# Patient Record
Sex: FEMALE | Race: WHITE | NOT HISPANIC OR LATINO | Employment: FULL TIME | URBAN - METROPOLITAN AREA
[De-identification: names, ages, dates, MRNs, and addresses within clinical notes are randomized per-mention and may not be internally consistent; named-entity substitution may affect disease eponyms.]

---

## 2017-01-09 ENCOUNTER — ALLSCRIPTS OFFICE VISIT (OUTPATIENT)
Dept: OTHER | Facility: OTHER | Age: 42
End: 2017-01-09

## 2017-01-18 ENCOUNTER — TRANSCRIBE ORDERS (OUTPATIENT)
Dept: ADMINISTRATIVE | Facility: HOSPITAL | Age: 42
End: 2017-01-18

## 2017-01-18 ENCOUNTER — ALLSCRIPTS OFFICE VISIT (OUTPATIENT)
Dept: OTHER | Facility: OTHER | Age: 42
End: 2017-01-18

## 2017-01-18 ENCOUNTER — HOSPITAL ENCOUNTER (OUTPATIENT)
Dept: RADIOLOGY | Facility: HOSPITAL | Age: 42
Discharge: HOME | End: 2017-01-18
Payer: COMMERCIAL

## 2017-01-18 DIAGNOSIS — M54.2 CERVICALGIA: ICD-10-CM

## 2017-01-18 PROCEDURE — 72050 X-RAY EXAM NECK SPINE 4/5VWS: CPT

## 2017-01-19 ENCOUNTER — GENERIC CONVERSION - ENCOUNTER (OUTPATIENT)
Dept: OTHER | Facility: OTHER | Age: 42
End: 2017-01-19

## 2017-01-27 ENCOUNTER — LAB REQUISITION (OUTPATIENT)
Dept: LAB | Facility: HOSPITAL | Age: 42
End: 2017-01-27
Payer: COMMERCIAL

## 2017-01-27 ENCOUNTER — ALLSCRIPTS OFFICE VISIT (OUTPATIENT)
Dept: OTHER | Facility: OTHER | Age: 42
End: 2017-01-27

## 2017-01-27 DIAGNOSIS — Z01.419 ENCOUNTER FOR GYNECOLOGICAL EXAMINATION WITHOUT ABNORMAL FINDING: ICD-10-CM

## 2017-01-27 PROCEDURE — 87624 HPV HI-RISK TYP POOLED RSLT: CPT | Performed by: PHYSICIAN ASSISTANT

## 2017-01-27 PROCEDURE — G0145 SCR C/V CYTO,THINLAYER,RESCR: HCPCS | Performed by: PHYSICIAN ASSISTANT

## 2017-01-31 LAB — HPV RRNA GENITAL QL NAA+PROBE: NORMAL

## 2017-02-02 ENCOUNTER — HOSPITAL ENCOUNTER (OUTPATIENT)
Dept: RADIOLOGY | Age: 42
Discharge: HOME | End: 2017-02-02
Payer: COMMERCIAL

## 2017-02-02 DIAGNOSIS — Z12.31 ENCOUNTER FOR SCREENING MAMMOGRAM FOR MALIGNANT NEOPLASM OF BREAST: ICD-10-CM

## 2017-02-02 LAB
LAB AP GYN PRIMARY INTERPRETATION: NORMAL
Lab: NORMAL
PATH INTERP SPEC-IMP: NORMAL

## 2017-02-02 PROCEDURE — G0202 SCR MAMMO BI INCL CAD: HCPCS

## 2017-02-03 DIAGNOSIS — R92.8 OTHER ABNORMAL AND INCONCLUSIVE FINDINGS ON DIAGNOSTIC IMAGING OF BREAST: ICD-10-CM

## 2017-02-06 ENCOUNTER — HOSPITAL ENCOUNTER (OUTPATIENT)
Dept: ULTRASOUND IMAGING | Facility: CLINIC | Age: 42
Discharge: HOME | End: 2017-02-06
Payer: COMMERCIAL

## 2017-02-06 DIAGNOSIS — R92.8 OTHER ABNORMAL AND INCONCLUSIVE FINDINGS ON DIAGNOSTIC IMAGING OF BREAST: ICD-10-CM

## 2017-02-06 PROCEDURE — 76642 ULTRASOUND BREAST LIMITED: CPT

## 2017-03-02 ENCOUNTER — ALLSCRIPTS OFFICE VISIT (OUTPATIENT)
Dept: OTHER | Facility: OTHER | Age: 42
End: 2017-03-02

## 2017-03-08 ENCOUNTER — APPOINTMENT (OUTPATIENT)
Dept: URGENT CARE | Age: 42
End: 2017-03-08
Payer: OTHER MISCELLANEOUS

## 2017-03-08 ENCOUNTER — TRANSCRIBE ORDERS (OUTPATIENT)
Dept: URGENT CARE | Age: 42
End: 2017-03-08

## 2017-03-08 ENCOUNTER — HOSPITAL ENCOUNTER (OUTPATIENT)
Dept: RADIOLOGY | Age: 42
Discharge: HOME | End: 2017-03-08
Admitting: PREVENTIVE MEDICINE
Payer: OTHER MISCELLANEOUS

## 2017-03-08 DIAGNOSIS — T14.90XA INJURY: Primary | ICD-10-CM

## 2017-03-08 DIAGNOSIS — T14.90XA INJURY: ICD-10-CM

## 2017-03-08 PROCEDURE — 72040 X-RAY EXAM NECK SPINE 2-3 VW: CPT

## 2017-03-08 PROCEDURE — G0382 LEV 3 HOSP TYPE B ED VISIT: HCPCS | Performed by: FAMILY MEDICINE

## 2017-03-08 PROCEDURE — 99283 EMERGENCY DEPT VISIT LOW MDM: CPT | Performed by: FAMILY MEDICINE

## 2017-03-20 ENCOUNTER — APPOINTMENT (OUTPATIENT)
Dept: URGENT CARE | Age: 42
End: 2017-03-20
Payer: OTHER MISCELLANEOUS

## 2017-03-20 ENCOUNTER — ALLSCRIPTS OFFICE VISIT (OUTPATIENT)
Dept: OTHER | Facility: OTHER | Age: 42
End: 2017-03-20

## 2017-03-20 PROCEDURE — 99213 OFFICE O/P EST LOW 20 MIN: CPT | Performed by: PREVENTIVE MEDICINE

## 2017-03-21 ENCOUNTER — LAB REQUISITION (OUTPATIENT)
Dept: LAB | Facility: HOSPITAL | Age: 42
End: 2017-03-21
Payer: COMMERCIAL

## 2017-03-21 DIAGNOSIS — R87.619 ABNORMAL CYTOLOGICAL FINDING IN SPECIMEN FROM CERVIX UTERI: ICD-10-CM

## 2017-03-21 PROCEDURE — 88305 TISSUE EXAM BY PATHOLOGIST: CPT | Performed by: OBSTETRICS & GYNECOLOGY

## 2017-04-28 ENCOUNTER — ALLSCRIPTS OFFICE VISIT (OUTPATIENT)
Dept: OTHER | Facility: OTHER | Age: 42
End: 2017-04-28

## 2017-04-28 DIAGNOSIS — E53.8 DEFICIENCY OF OTHER SPECIFIED B GROUP VITAMINS: ICD-10-CM

## 2017-04-28 DIAGNOSIS — E55.9 VITAMIN D DEFICIENCY: ICD-10-CM

## 2017-04-28 DIAGNOSIS — E03.9 HYPOTHYROIDISM: ICD-10-CM

## 2017-04-28 DIAGNOSIS — E61.1 IRON DEFICIENCY: ICD-10-CM

## 2017-04-28 DIAGNOSIS — R73.09 OTHER ABNORMAL GLUCOSE: ICD-10-CM

## 2017-04-29 ENCOUNTER — APPOINTMENT (OUTPATIENT)
Dept: LAB | Age: 42
End: 2017-04-29
Payer: COMMERCIAL

## 2017-04-29 ENCOUNTER — TRANSCRIBE ORDERS (OUTPATIENT)
Dept: ADMINISTRATIVE | Age: 42
End: 2017-04-29

## 2017-04-29 DIAGNOSIS — E61.1 IRON DEFICIENCY: ICD-10-CM

## 2017-04-29 DIAGNOSIS — E53.8 DEFICIENCY OF OTHER SPECIFIED B GROUP VITAMINS: ICD-10-CM

## 2017-04-29 DIAGNOSIS — E55.9 VITAMIN D DEFICIENCY: ICD-10-CM

## 2017-04-29 DIAGNOSIS — R73.09 OTHER ABNORMAL GLUCOSE: ICD-10-CM

## 2017-04-29 DIAGNOSIS — E03.9 HYPOTHYROIDISM: ICD-10-CM

## 2017-04-29 LAB
25(OH)D3 SERPL-MCNC: 30.2 NG/ML (ref 30–100)
ALBUMIN SERPL BCP-MCNC: 3 G/DL (ref 3.5–5)
ALP SERPL-CCNC: 84 U/L (ref 46–116)
ALT SERPL W P-5'-P-CCNC: 20 U/L (ref 12–78)
ANION GAP SERPL CALCULATED.3IONS-SCNC: 8 MMOL/L (ref 4–13)
AST SERPL W P-5'-P-CCNC: 16 U/L (ref 5–45)
BASOPHILS # BLD AUTO: 0.07 THOUSANDS/ΜL (ref 0–0.1)
BASOPHILS NFR BLD AUTO: 1 % (ref 0–1)
BILIRUB SERPL-MCNC: 0.46 MG/DL (ref 0.2–1)
BUN SERPL-MCNC: 7 MG/DL (ref 5–25)
CALCIUM SERPL-MCNC: 9 MG/DL (ref 8.3–10.1)
CHLORIDE SERPL-SCNC: 107 MMOL/L (ref 100–108)
CHOLEST SERPL-MCNC: 197 MG/DL (ref 50–200)
CO2 SERPL-SCNC: 27 MMOL/L (ref 21–32)
CREAT SERPL-MCNC: 0.82 MG/DL (ref 0.6–1.3)
EOSINOPHIL # BLD AUTO: 0.31 THOUSAND/ΜL (ref 0–0.61)
EOSINOPHIL NFR BLD AUTO: 4 % (ref 0–6)
ERYTHROCYTE [DISTWIDTH] IN BLOOD BY AUTOMATED COUNT: 13.9 % (ref 11.6–15.1)
EST. AVERAGE GLUCOSE BLD GHB EST-MCNC: 114 MG/DL
FERRITIN SERPL-MCNC: 9 NG/ML (ref 8–388)
GFR SERPL CREATININE-BSD FRML MDRD: >60 ML/MIN/1.73SQ M
GLUCOSE P FAST SERPL-MCNC: 84 MG/DL (ref 65–99)
HBA1C MFR BLD: 5.6 % (ref 4.2–6.3)
HCT VFR BLD AUTO: 38.7 % (ref 34.8–46.1)
HDLC SERPL-MCNC: 69 MG/DL (ref 40–60)
HGB BLD-MCNC: 12.3 G/DL (ref 11.5–15.4)
IRON SERPL-MCNC: 57 UG/DL (ref 50–170)
LDLC SERPL CALC-MCNC: 101 MG/DL (ref 0–100)
LYMPHOCYTES # BLD AUTO: 2.96 THOUSANDS/ΜL (ref 0.6–4.47)
LYMPHOCYTES NFR BLD AUTO: 40 % (ref 14–44)
MCH RBC QN AUTO: 28.6 PG (ref 26.8–34.3)
MCHC RBC AUTO-ENTMCNC: 31.8 G/DL (ref 31.4–37.4)
MCV RBC AUTO: 90 FL (ref 82–98)
MONOCYTES # BLD AUTO: 0.51 THOUSAND/ΜL (ref 0.17–1.22)
MONOCYTES NFR BLD AUTO: 7 % (ref 4–12)
NEUTROPHILS # BLD AUTO: 3.57 THOUSANDS/ΜL (ref 1.85–7.62)
NEUTS SEG NFR BLD AUTO: 48 % (ref 43–75)
NRBC BLD AUTO-RTO: 0 /100 WBCS
PLATELET # BLD AUTO: 369 THOUSANDS/UL (ref 149–390)
PMV BLD AUTO: 10.2 FL (ref 8.9–12.7)
POTASSIUM SERPL-SCNC: 4.5 MMOL/L (ref 3.5–5.3)
PROT SERPL-MCNC: 7.2 G/DL (ref 6.4–8.2)
RBC # BLD AUTO: 4.3 MILLION/UL (ref 3.81–5.12)
SODIUM SERPL-SCNC: 142 MMOL/L (ref 136–145)
TRIGL SERPL-MCNC: 134 MG/DL
TSH SERPL DL<=0.05 MIU/L-ACNC: 0.99 UIU/ML (ref 0.36–3.74)
VIT B12 SERPL-MCNC: 354 PG/ML (ref 100–900)
WBC # BLD AUTO: 7.43 THOUSAND/UL (ref 4.31–10.16)

## 2017-04-29 PROCEDURE — 80061 LIPID PANEL: CPT

## 2017-04-29 PROCEDURE — 82728 ASSAY OF FERRITIN: CPT

## 2017-04-29 PROCEDURE — 84443 ASSAY THYROID STIM HORMONE: CPT

## 2017-04-29 PROCEDURE — 82306 VITAMIN D 25 HYDROXY: CPT

## 2017-04-29 PROCEDURE — 82607 VITAMIN B-12: CPT

## 2017-04-29 PROCEDURE — 85025 COMPLETE CBC W/AUTO DIFF WBC: CPT

## 2017-04-29 PROCEDURE — 36415 COLL VENOUS BLD VENIPUNCTURE: CPT

## 2017-04-29 PROCEDURE — 83540 ASSAY OF IRON: CPT

## 2017-04-29 PROCEDURE — 83036 HEMOGLOBIN GLYCOSYLATED A1C: CPT

## 2017-04-29 PROCEDURE — 80053 COMPREHEN METABOLIC PANEL: CPT

## 2017-04-30 ENCOUNTER — GENERIC CONVERSION - ENCOUNTER (OUTPATIENT)
Dept: OTHER | Facility: OTHER | Age: 42
End: 2017-04-30

## 2017-06-21 ENCOUNTER — ALLSCRIPTS OFFICE VISIT (OUTPATIENT)
Dept: OTHER | Facility: OTHER | Age: 42
End: 2017-06-21

## 2018-01-10 NOTE — RESULT NOTES
Verified Results  * XR SPINE CERVICAL COMPLETE 4 OR 5 VW NON INJURY [id number] 11:16AM [facility] [employer] Order Number: [id number]     Test Name Result Flag Reference   XR SPINE CERVICAL COMPLETE 4 OR 5 VW (Report)     CERVICAL SPINE     INDICATION: Pinching sensation in left side of head, neck and shoulder  No trauma  COMPARISON: January 7, 2009     VIEWS: 5; 5 images     FINDINGS:     Vertebral alignment is normal      The vertebral bodies are normal in height  There is no evidence of fracture, subluxation or dislocation  There is no bone destruction or osteoblastic lesion  The intervertebral disc spaces are preserved  The facet joints are unremarkable  The prevertebral soft tissues are normal in thickness  The lung apices are clear  IMPRESSION:     Unremarkable cervical spine            Workstation performed: [id number]     Signed by:   Garfield Dobson MD   1/19/17       Discussion/Summary   Normal Neck X ray   - Dr Patricia Meyer   Electronically signed by : Cesar Humphries MD; Jan 19 2017  1:14PM EST                       (Author)

## 2018-01-12 VITALS
DIASTOLIC BLOOD PRESSURE: 84 MMHG | HEART RATE: 88 BPM | WEIGHT: 211.25 LBS | RESPIRATION RATE: 18 BRPM | SYSTOLIC BLOOD PRESSURE: 110 MMHG | BODY MASS INDEX: 32.12 KG/M2

## 2018-01-12 VITALS
WEIGHT: 209.6 LBS | HEART RATE: 96 BPM | RESPIRATION RATE: 18 BRPM | DIASTOLIC BLOOD PRESSURE: 88 MMHG | BODY MASS INDEX: 31.77 KG/M2 | SYSTOLIC BLOOD PRESSURE: 120 MMHG | HEIGHT: 68 IN

## 2018-01-13 VITALS
WEIGHT: 214 LBS | DIASTOLIC BLOOD PRESSURE: 84 MMHG | BODY MASS INDEX: 32.43 KG/M2 | SYSTOLIC BLOOD PRESSURE: 130 MMHG | HEIGHT: 68 IN

## 2018-01-13 NOTE — RESULT NOTES
Discussion/Summary   EVERYTHING IS OK, WITHIN RANGE, BUT IRON AND IRON STORES ARE SLIGHTLY ON THE LOWER END  YOU COULD BENEFIT FROM ORAL IRON REPLACMENT   DR Elizabeth Tolliver     Verified Results  (1) COMPREHENSIVE METABOLIC PANEL [id number] 36:36JF [facility] [employer] Order Number: [id number]     Test Name Result Flag Reference   SODIUM 142 mmol/L  136-145   POTASSIUM 4 5 mmol/L  3 5-5 3   CHLORIDE 107 mmol/L  100-108   CARBON DIOXIDE 27 mmol/L  21-32   ANION GAP (CALC) 8 mmol/L  4-13   BLOOD UREA NITROGEN 7 mg/dL  5-25   CREATININE 0 82 mg/dL  0 60-1 30   Standardized to IDMS reference method   CALCIUM 9 0 mg/dL  8 3-10 1   BILI, TOTAL 0 46 mg/dL  0 20-1 00   ALK PHOSPHATAS 84 U/L     ALT (SGPT) 20 U/L  12-78   AST(SGOT) 16 U/L  5-45   ALBUMIN 3 0 g/dL L 3 5-5 0   TOTAL PROTEIN 7 2 g/dL  6 4-8 2   eGFR Non-African American      >60 0 ml/min/1 73sq [facility] Disease Education Program recommendations are as follows:  GFR calculation is accurate only with a steady state creatinine  Chronic Kidney disease less than 60 ml/min/1 73 sq  meters  Kidney failure less than 15 ml/min/1 73 sq  meters  GLUCOSE FASTING 84 mg/dL  65-99     (1) HEMOGLOBIN A1C 29Apr2017 10:19AM [employer] Order Number: [id number]     Test Name Result Flag Reference   HEMOGLOBIN A1C 5 6 %  4 2-6 3   EST  AVG   GLUCOSE 114 mg/dl       (1) LIPID PANEL FASTING W DIRECT LDL REFLEX 29Apr2017 10:19AM [employer] Order Number: [id number]     Test Name Result Flag Reference   CHOLESTEROL 197 mg/dL     LDL CHOLESTEROL CALCULATED 101 mg/dL H 0-100   Triglyceride:         Normal              <150 mg/dl       Borderline High    150-199 mg/dl       High               200-499 mg/dl       Very High          >499 mg/dl  Cholesterol:         Desirable        <200 mg/dl      Borderline High  200-239 mg/dl      High             >239 mg/dl  HDL Cholesterol:        High    >59 mg/dL      Low     <41 mg/dL  LDL Cholesterol:        Optimal          <100 mg/dl        Near Optimal     100-129 mg/dl        Above Optimal          Borderline High   130-159 mg/dl          High              160-189 mg/dl          Very High        >189 mg/dl  LDL CALCULATED:    This screening LDL is a calculated result  It does not have the accuracy of the Direct Measured LDL in the monitoring of patients with hyperlipidemia and/or statin therapy  Direct Measure LDL (XMD504) must be ordered separately in these patients  TRIGLYCERIDES 134 mg/dL  <=150   Specimen collection should occur prior to N-Acetylcysteine or Metamizole administration due to the potential for falsely depressed results  HDL,DIRECT 69 mg/dL H 40-60   Specimen collection should occur prior to Metamizole administration due to the potential for falsely depressed results  (1) TSH WITH FT4 REFLEX 29Apr2017 10:19AM Kati Reis Order Number: [id number]     Test Name Result Flag Reference   TSH 0 989 uIU/mL  0 358-3 740   Patients undergoing fluorescein dye angiography may retain small amounts of fluorescein in the body for 48-72 hours post procedure  Samples containing fluorescein can produce falsely depressed TSH values  If the patient had this procedure,a specimen should be resubmitted post fluorescein clearance  The recommended reference ranges for TSH during pregnancy are as follows:  First trimester 0 1 to 2 5 uIU/mL  Second trimester  0 2 to 3 0 uIU/mL  Third trimester 0 3 to 3 0 uIU/m     (1) CBC/PLT/DIFF 29Apr2017 10:19AM Betty Hernandez    Order Number: [id number]     Test Name Result Flag Reference   WBC COUNT 7 43 Thousand/uL  4 31-10 16   RBC COUNT 4 30 Million/uL  3 81-5 12   HEMOGLOBIN 12 3 g/dL  11 5-15 4   HEMATOCRIT 38 7 %  34 8-46  1   MCV 90 fL  82-98   MCH 28 6 pg  26 8-34 3   MCHC 31 8 g/dL  31 4-37 4   RDW 13 9 %  11 6-15 1   MPV 10 2 fL  8 9-12 7   PLATELET COUNT 991 Thousands/uL  149-390   nRBC AUTOMATED 0 /100 WBCs     NEUTROPHILS RELATIVE PERCENT 48 %  43-75   LYMPHOCYTES RELATIVE PERCENT 40 %  14-44   MONOCYTES RELATIVE PERCENT 7 %  4-12   EOSINOPHILS RELATIVE PERCENT 4 %  0-6   BASOPHILS RELATIVE PERCENT 1 %  0-1   NEUTROPHILS ABSOLUTE COUNT 3 57 Thousands/? ??L  1 85-7 62   LYMPHOCYTES ABSOLUTE COUNT 2 96 Thousands/? ??L  0 60-4 47   MONOCYTES ABSOLUTE COUNT 0 51 Thousand/? ??L  0 17-1 22   EOSINOPHILS ABSOLUTE COUNT 0 31 Thousand/? ??L  0 00-0 61   BASOPHILS ABSOLUTE COUNT 0 07 Thousands/? ??L  0 00-0 10     (1) VITAMIN B12 29Apr2017 10:19AM [employer] Order Number: [id number]     Test Name Result Flag Reference   VITAMIN B12 354 pg/mL  100-900     (1) VITAMIN D 25-HYDROXY 29Apr2017 10:19AM [employer] Order Number: [id number]     Test Name Result Flag Reference   VIT D 25-HYDROX 30 2 ng/mL  30 0-100 0   This assay is a certified procedure of the CDC Vitamin D Standardization Certification Program (VDSCP)     Deficiency <20ng/ml   Insufficiency 20-30ng/ml   Sufficient  ng/ml     *Patients undergoing fluorescein dye angiography may retain small amounts of fluorescein in the body for 48-72 hours post procedure  Samples containing fluorescein can produce falsely elevated Vitamin D values  If the patient had this procedure, a specimen should be resubmitted post fluorescein clearance  (1) IRON [id number] 10:19AM [employer] Order Number: [id number]     Test Name Result Flag Reference   IRON 57 ug/dL     Patients treated with metal-binding drugs (ie  Deferoxamine) may have depressed iron values       (1) FERRITIN [id number] 10:19AM [employer] Order Number: [id number]     Test Name Result Flag Reference   FERRITIN 9 ng/mL  7-933

## 2018-01-14 VITALS
BODY MASS INDEX: 32.28 KG/M2 | WEIGHT: 213 LBS | SYSTOLIC BLOOD PRESSURE: 130 MMHG | DIASTOLIC BLOOD PRESSURE: 82 MMHG | HEIGHT: 68 IN

## 2018-01-14 VITALS
SYSTOLIC BLOOD PRESSURE: 124 MMHG | HEART RATE: 78 BPM | DIASTOLIC BLOOD PRESSURE: 82 MMHG | RESPIRATION RATE: 18 BRPM | BODY MASS INDEX: 31.99 KG/M2 | WEIGHT: 210.38 LBS

## 2018-01-14 NOTE — RESULT NOTES
Verified Results  (1) TSH [id number] 07:42AM [facility] Order Number: [id number]    Patients undergoing fluorescein dye angiography may retain small amounts of fluorescein in the body for 48-72 hours post procedure  Samples containing fluorescein can produce falsely depressed TSH values  If the patient had this procedure,a specimen should be resubmitted post fluorescein clearance          The recommended reference ranges for TSH during pregnancy are as follows:  First trimester 0 1 to 2 5 uIU/mL  Second trimester  0 2 to 3 0 uIU/mL  Third trimester 0 3 to 3 0 uIU/m     Test Name Result Flag Reference   TSH 1 087 uIU/mL  0 358-3 740     (1) VITAMIN B12 [id number] 07:42AM Tex Duran     Test Name Result Flag Reference   VITAMIN B12 192 pg/mL  100-900     (1) VITAMIN D 25-HYDROXY [id number] 07:42AM Tex Duran     Test Name Result Flag Reference   VIT D 25-HYDROX 5 6 ng/mL L 30 0-100 0       Plan  Vitamin D deficiency    · Vitamin D (Ergocalciferol) 39551 UNIT Oral Capsule; TAKE 1 CAPSULE WEEKLY

## 2018-01-15 VITALS
WEIGHT: 214.25 LBS | DIASTOLIC BLOOD PRESSURE: 70 MMHG | SYSTOLIC BLOOD PRESSURE: 120 MMHG | HEIGHT: 68 IN | HEART RATE: 78 BPM | BODY MASS INDEX: 32.47 KG/M2 | RESPIRATION RATE: 18 BRPM

## 2018-01-18 NOTE — MISCELLANEOUS
Message  Return to work or school:   Tarsha Phelps is under my professional care  She was seen in my office on 12/29/2016   She is able to return to work on  12/30/2016            Future Appointments    Signatures   Electronically signed by : MIKE Garcia; Dec 29 2016 12:50PM EST                       (Author)    Electronically signed by :  MIKE Garcia; Dec 29 2016  3:36PM EST

## 2018-01-29 ENCOUNTER — TRANSCRIBE ORDERS (OUTPATIENT)
Dept: OBGYN CLINIC | Facility: CLINIC | Age: 43
End: 2018-01-29

## 2018-01-29 ENCOUNTER — TELEPHONE (OUTPATIENT)
Dept: OBGYN CLINIC | Facility: CLINIC | Age: 43
End: 2018-01-29

## 2018-01-29 DIAGNOSIS — Z30.41 ENCOUNTER FOR SURVEILLANCE OF CONTRACEPTIVE PILLS: Primary | ICD-10-CM

## 2018-01-29 DIAGNOSIS — Z12.31 ENCOUNTER FOR SCREENING MAMMOGRAM FOR MALIGNANT NEOPLASM OF BREAST: Primary | ICD-10-CM

## 2018-02-01 DIAGNOSIS — Z30.41 ENCOUNTER FOR SURVEILLANCE OF CONTRACEPTIVE PILLS: Primary | ICD-10-CM

## 2018-02-01 RX ORDER — DROSPIRENONE AND ETHINYL ESTRADIOL 0.02-3(28)
1 KIT ORAL DAILY
Qty: 28 TABLET | Refills: 0 | Status: SHIPPED | OUTPATIENT
Start: 2018-02-01 | End: 2018-03-01 | Stop reason: SDUPTHER

## 2018-02-01 RX ORDER — DROSPIRENONE AND ETHINYL ESTRADIOL 0.02-3(28)
1 KIT ORAL DAILY
Qty: 28 TABLET | Refills: 1 | Status: SHIPPED | OUTPATIENT
Start: 2018-02-01 | End: 2018-03-01 | Stop reason: SDUPTHER

## 2018-02-02 RX ORDER — DROSPIRENONE AND ETHINYL ESTRADIOL 0.02-3(28)
1 KIT ORAL DAILY
Qty: 90 TABLET | Refills: 0 | Status: SHIPPED | OUTPATIENT
Start: 2018-02-02 | End: 2018-03-01 | Stop reason: SDUPTHER

## 2018-03-01 ENCOUNTER — TRANSCRIBE ORDERS (OUTPATIENT)
Dept: RADIOLOGY | Facility: CLINIC | Age: 43
End: 2018-03-01

## 2018-03-01 ENCOUNTER — OFFICE VISIT (OUTPATIENT)
Dept: OBGYN CLINIC | Facility: CLINIC | Age: 43
End: 2018-03-01
Payer: COMMERCIAL

## 2018-03-01 ENCOUNTER — APPOINTMENT (OUTPATIENT)
Dept: LAB | Facility: CLINIC | Age: 43
End: 2018-03-01
Payer: COMMERCIAL

## 2018-03-01 ENCOUNTER — TRANSCRIBE ORDERS (OUTPATIENT)
Dept: LAB | Facility: CLINIC | Age: 43
End: 2018-03-01

## 2018-03-01 VITALS
DIASTOLIC BLOOD PRESSURE: 76 MMHG | SYSTOLIC BLOOD PRESSURE: 118 MMHG | BODY MASS INDEX: 28.95 KG/M2 | WEIGHT: 191 LBS | HEIGHT: 68 IN

## 2018-03-01 DIAGNOSIS — Z12.31 ENCOUNTER FOR SCREENING MAMMOGRAM FOR BREAST CANCER: ICD-10-CM

## 2018-03-01 DIAGNOSIS — Z01.419 VISIT FOR GYNECOLOGIC EXAMINATION: Primary | ICD-10-CM

## 2018-03-01 DIAGNOSIS — E55.9 VITAMIN D DEFICIENCY: ICD-10-CM

## 2018-03-01 DIAGNOSIS — D50.8 OTHER IRON DEFICIENCY ANEMIA: ICD-10-CM

## 2018-03-01 DIAGNOSIS — N84.1 CERVICAL POLYP: ICD-10-CM

## 2018-03-01 DIAGNOSIS — Z30.41 ENCOUNTER FOR SURVEILLANCE OF CONTRACEPTIVE PILLS: ICD-10-CM

## 2018-03-01 DIAGNOSIS — E03.8 OTHER SPECIFIED HYPOTHYROIDISM: ICD-10-CM

## 2018-03-01 DIAGNOSIS — R87.619 ATYPICAL GLANDULAR CELLS OF UNDETERMINED SIGNIFICANCE (AGUS) ON CERVICAL PAP SMEAR: ICD-10-CM

## 2018-03-01 DIAGNOSIS — R92.2 DENSE BREASTS: ICD-10-CM

## 2018-03-01 PROBLEM — R92.8 ABNORMAL MAMMOGRAM OF RIGHT BREAST: Status: ACTIVE | Noted: 2017-02-03

## 2018-03-01 PROBLEM — L98.8 AGE-RELATED FACIAL WRINKLES: Status: ACTIVE | Noted: 2017-06-21

## 2018-03-01 PROBLEM — J34.2 DEVIATED NASAL SEPTUM: Status: ACTIVE | Noted: 2017-01-09

## 2018-03-01 PROBLEM — E61.1 IRON DEFICIENCY: Status: ACTIVE | Noted: 2017-04-28

## 2018-03-01 LAB
25(OH)D3 SERPL-MCNC: 15 NG/ML (ref 30–100)
FERRITIN SERPL-MCNC: 20 NG/ML (ref 8–388)
IRON SERPL-MCNC: 107 UG/DL (ref 50–170)
T3FREE SERPL-MCNC: 2.43 PG/ML (ref 2.3–4.2)
T4 FREE SERPL-MCNC: 1.2 NG/DL (ref 0.76–1.46)
TIBC SERPL-MCNC: 526 UG/DL (ref 250–450)
TRANSFERRIN SERPL-MCNC: 387 MG/DL (ref 200–400)
TSH SERPL DL<=0.05 MIU/L-ACNC: 1.92 UIU/ML (ref 0.36–3.74)

## 2018-03-01 PROCEDURE — 99396 PREV VISIT EST AGE 40-64: CPT | Performed by: PHYSICIAN ASSISTANT

## 2018-03-01 PROCEDURE — 84466 ASSAY OF TRANSFERRIN: CPT

## 2018-03-01 PROCEDURE — 84439 ASSAY OF FREE THYROXINE: CPT

## 2018-03-01 PROCEDURE — G0145 SCR C/V CYTO,THINLAYER,RESCR: HCPCS | Performed by: PHYSICIAN ASSISTANT

## 2018-03-01 PROCEDURE — 84443 ASSAY THYROID STIM HORMONE: CPT

## 2018-03-01 PROCEDURE — 87624 HPV HI-RISK TYP POOLED RSLT: CPT | Performed by: PHYSICIAN ASSISTANT

## 2018-03-01 PROCEDURE — 83550 IRON BINDING TEST: CPT

## 2018-03-01 PROCEDURE — 82728 ASSAY OF FERRITIN: CPT

## 2018-03-01 PROCEDURE — 82306 VITAMIN D 25 HYDROXY: CPT

## 2018-03-01 PROCEDURE — 36415 COLL VENOUS BLD VENIPUNCTURE: CPT

## 2018-03-01 PROCEDURE — 83540 ASSAY OF IRON: CPT

## 2018-03-01 PROCEDURE — 84481 FREE ASSAY (FT-3): CPT

## 2018-03-01 RX ORDER — ALPRAZOLAM 0.5 MG/1
1 TABLET ORAL 2 TIMES DAILY
COMMUNITY
Start: 2013-12-31 | End: 2018-06-01 | Stop reason: SDUPTHER

## 2018-03-01 RX ORDER — LEVOTHYROXINE SODIUM 112 UG/1
1 TABLET ORAL DAILY
COMMUNITY
Start: 2015-03-12 | End: 2018-03-29 | Stop reason: SDUPTHER

## 2018-03-01 RX ORDER — BUPROPION HYDROCHLORIDE 150 MG/1
1 TABLET, EXTENDED RELEASE ORAL DAILY
COMMUNITY
Start: 2013-02-05 | End: 2018-03-29 | Stop reason: SDUPTHER

## 2018-03-01 RX ORDER — SCOLOPAMINE TRANSDERMAL SYSTEM 1 MG/1
PATCH, EXTENDED RELEASE TRANSDERMAL
COMMUNITY
Start: 2016-11-08 | End: 2018-06-18 | Stop reason: ALTCHOICE

## 2018-03-01 RX ORDER — DROSPIRENONE AND ETHINYL ESTRADIOL 0.02-3(28)
1 KIT ORAL DAILY
Qty: 90 TABLET | Refills: 3 | Status: SHIPPED | OUTPATIENT
Start: 2018-03-01 | End: 2019-03-04 | Stop reason: SDUPTHER

## 2018-03-01 NOTE — ASSESSMENT & PLAN NOTE
Reviewed small cervical polyp, patient not having any spotting or bleeding with intercourse  Will see how pap results are, if abnormal will most likely remove with colpo   If normal should consider coming in for polyp removal

## 2018-03-01 NOTE — PROGRESS NOTES
Assessment/Plan:    Problem List Items Addressed This Visit     Hypothyroidism    Relevant Medications    levothyroxine 112 mcg tablet    Other Relevant Orders    TSH, 3rd generation    T4, free    T3, free    Vitamin D deficiency    Relevant Orders    Vitamin D 25 hydroxy    Cervical polyp     Reviewed small cervical polyp, patient not having any spotting or bleeding with intercourse  Will see how pap results are, if abnormal will most likely remove with colpo  If normal should consider coming in for polyp removal           Visit for gynecologic examination - Primary     Pap & HPV done today  Will continue Gianvi OCP, needs Gianvi brand, does not tolerate others  Script given to evaluate thyroid, vitamin D deficiency, and iron studies  Return to office for annual or as needed  Relevant Orders    Liquid-based pap, screening      Other Visit Diagnoses     Atypical glandular cells of undetermined significance (DORIAN) on cervical Pap smear        Relevant Orders    Liquid-based pap, screening    Other iron deficiency anemia        Relevant Orders    Iron    Ferritin    Transferrin    TIBC    Encounter for screening mammogram for breast cancer        Relevant Orders    Mammo screening bilateral w 3d & cad    Dense breasts        Relevant Orders    Mammo screening bilateral w 3d & cad    Encounter for surveillance of contraceptive pills        Relevant Medications    drospirenone-ethinyl estradiol (BEATRIZ) 3-0 02 MG per tablet            Subjective:      Patient ID: Kimberly Talavera is a 40 y o  y o  female  HPI  39 yo seen for annual exam  Currently on Beatriz OCP, tolerating well would like to continue  Does not get menses on current pill  Denies bowel or bladder issues  Last pap: 1/2017 Atypical glandular cells  (-)HRHPV Colpo: Biopsy negative  Last mammogram: 2/6/2017 BIRADS 2: Benign  Patient has hx of hypothyroidism has not had levels checked in a while, reports having more hot flashes   Also hx of vitamin D deficiency and iron deficiency  Would like levels checked  The following portions of the patient's history were reviewed and updated as appropriate:   She  has a past medical history of Arthritis; Asthma; Breast lump; Depression; Disease of thyroid gland; Hair loss; Hemorrhage of conjunctiva, left; Lyme disease; and Thyroid disease  She   Patient Active Problem List    Diagnosis Date Noted    Cervical polyp 03/01/2018    Visit for gynecologic examination 03/01/2018    Age-related facial wrinkles 06/21/2017    Iron deficiency 04/28/2017    Atypical glandular cell changes cervix of undetermined significance favor dysplasia 03/20/2017    Abnormal mammogram of right breast 02/03/2017    Deviated nasal septum 01/09/2017    Abnormal blood sugar 04/14/2016    Obesity 04/14/2016    Primary osteoarthritis of left knee 07/29/2015    Hyperglyceridemia 03/12/2015    Herpes labialis 12/18/2014    Vitamin B12 deficiency 07/28/2014    Vitamin D deficiency 07/28/2014    Depression with anxiety 02/05/2013    Allergic rhinitis 07/09/2012    Fibromyalgia 07/09/2012    Hypothyroidism 07/09/2012    Migraine headache 07/09/2012     She  has a past surgical history that includes Lumbar fusion (01/01/2004)  Her family history includes Arthritis in her maternal grandmother; Atrial fibrillation in her mother; Diabetes in her father; Heart disease in her mother; Hyperlipidemia in her father and mother; Hypertension in her father and mother; Leukemia in her maternal grandmother; Pancreatic cancer in her maternal grandfather; Stroke in her maternal grandfather; Supraventricular tachycardia in her mother  She  reports that she has never smoked  She has never used smokeless tobacco  She reports that she does not drink alcohol or use drugs    Current Outpatient Prescriptions   Medication Sig Dispense Refill    ALPRAZolam (XANAX) 0 5 mg tablet Take 1 tablet by mouth 2 (two) times a day      buPROPion [facility] SR) 150 mg 12 hr tablet Take 1 tablet by mouth daily      levothyroxine 112 mcg tablet Take 1 tablet by mouth daily      scopolamine (TRANSDERM-SCOP) 1 5 mg/3 days TD 72 hr patch Place on the skin      drospirenone-ethinyl estradiol (BEATRIZ) 3-0 02 MG per tablet Take 1 tablet by mouth daily for 90 days 90 tablet 3     No current facility-administered medications for this visit  She is allergic to pollen extract       Menstrual History:  OB History      Para Term  AB Living    1 1 1     1    SAB TAB Ectopic Multiple Live Births            1         Menarche age: 16  No LMP recorded  Unknown        Review of Systems   Constitutional: Negative for fatigue, fever and unexpected weight change  HENT: Negative for dental problem and sinus pressure  Eyes: Negative for visual disturbance  Respiratory: Negative for cough, shortness of breath and wheezing  Cardiovascular: Negative for chest pain  Gastrointestinal: Negative for abdominal pain, blood in stool, constipation, diarrhea, nausea and vomiting  Endocrine: Negative for polydipsia  Genitourinary: Negative for difficulty urinating, dyspareunia, dysuria, frequency, hematuria, pelvic pain and urgency  Musculoskeletal: Negative for arthralgias and back pain  Neurological: Negative for dizziness, seizures, light-headedness and headaches  Psychiatric/Behavioral: Negative for suicidal ideas  The patient is not nervous/anxious  Objective:     Physical Exam   Constitutional: She is oriented to person, place, and time  She appears well-developed and well-nourished  Genitourinary: Rectum normal, vagina normal and uterus normal  There is no rash, tenderness, lesion, injury or Bartholin's cyst on the right labia  There is no rash, tenderness, lesion, injury or Bartholin's cyst on the left labia  Vagina exhibits no lesion  No erythema, tenderness or bleeding in the vagina  No signs of injury around the vagina   No vaginal discharge found  Right adnexum does not display mass, does not display tenderness and does not display fullness  Left adnexum does not display mass, does not display tenderness and does not display fullness  Cervix does not exhibit motion tenderness, lesion or discharge  Uterus is not enlarged, tender, exhibiting a mass, irregular (is regular) or mobile  Rectal exam shows no external hemorrhoid, no internal hemorrhoid, no fissure, no mass, no tenderness, anal tone normal and guaiac negative stool  HENT:   Head: Normocephalic and atraumatic  Neck: No thyromegaly present  Cardiovascular: Normal rate, regular rhythm and normal heart sounds  Exam reveals no gallop and no friction rub  No murmur heard  Pulmonary/Chest: Effort normal and breath sounds normal  No respiratory distress  She has no wheezes  Right breast exhibits no inverted nipple, no mass, no nipple discharge, no skin change and no tenderness  Left breast exhibits no inverted nipple, no mass, no nipple discharge, no skin change and no tenderness  Breasts are symmetrical  There is no breast swelling  Abdominal: Soft  She exhibits no distension and no mass  There is no tenderness  There is no rebound and no guarding  No hernia  Lymphadenopathy:     She has no cervical adenopathy  Right: No inguinal adenopathy present  Left: No inguinal adenopathy present  Neurological: She is alert and oriented to person, place, and time  Skin: Skin is warm and dry  Psychiatric: She has a normal mood and affect   Her behavior is normal

## 2018-03-01 NOTE — ASSESSMENT & PLAN NOTE
Pap & HPV done today  Will continue Gianvi OCP, needs Gianvi brand, does not tolerate others  Script given to evaluate thyroid, vitamin D deficiency, and iron studies  Return to office for annual or as needed

## 2018-03-02 ENCOUNTER — HOSPITAL ENCOUNTER (OUTPATIENT)
Dept: RADIOLOGY | Age: 43
Discharge: HOME | End: 2018-03-02
Payer: COMMERCIAL

## 2018-03-02 DIAGNOSIS — R92.2 DENSE BREASTS: ICD-10-CM

## 2018-03-02 DIAGNOSIS — Z12.31 ENCOUNTER FOR SCREENING MAMMOGRAM FOR BREAST CANCER: ICD-10-CM

## 2018-03-02 PROCEDURE — 77063 BREAST TOMOSYNTHESIS BI: CPT

## 2018-03-02 PROCEDURE — 77067 SCR MAMMO BI INCL CAD: CPT

## 2018-03-05 LAB — HPV RRNA GENITAL QL NAA+PROBE: NORMAL

## 2018-03-08 ENCOUNTER — TELEPHONE (OUTPATIENT)
Dept: OBGYN CLINIC | Facility: CLINIC | Age: 43
End: 2018-03-08

## 2018-03-08 LAB
LAB AP GYN PRIMARY INTERPRETATION: NORMAL
LAB AP LMP: NORMAL
Lab: NORMAL

## 2018-03-08 NOTE — TELEPHONE ENCOUNTER
Pt is aware of normal mammo results  Pap is not resulted yet  Leave this task open and call pt with results of pap when we have them

## 2018-03-29 DIAGNOSIS — F41.8 DEPRESSION WITH ANXIETY: Primary | ICD-10-CM

## 2018-03-29 DIAGNOSIS — E03.9 HYPOTHYROIDISM, UNSPECIFIED TYPE: ICD-10-CM

## 2018-03-29 RX ORDER — LEVOTHYROXINE SODIUM 112 UG/1
112 TABLET ORAL DAILY
Qty: 90 TABLET | Refills: 2 | OUTPATIENT
Start: 2018-03-29 | End: 2019-01-28 | Stop reason: SDUPTHER

## 2018-03-29 RX ORDER — BUPROPION HYDROCHLORIDE 150 MG/1
150 TABLET, EXTENDED RELEASE ORAL DAILY
Qty: 90 TABLET | Refills: 2 | OUTPATIENT
Start: 2018-03-29 | End: 2018-04-30 | Stop reason: SDUPTHER

## 2018-04-30 ENCOUNTER — OFFICE VISIT (OUTPATIENT)
Dept: FAMILY MEDICINE CLINIC | Facility: CLINIC | Age: 43
End: 2018-04-30
Payer: COMMERCIAL

## 2018-04-30 VITALS
DIASTOLIC BLOOD PRESSURE: 80 MMHG | HEIGHT: 68 IN | SYSTOLIC BLOOD PRESSURE: 134 MMHG | BODY MASS INDEX: 30.55 KG/M2 | HEART RATE: 88 BPM | RESPIRATION RATE: 16 BRPM | WEIGHT: 201.6 LBS

## 2018-04-30 DIAGNOSIS — E55.9 VITAMIN D DEFICIENCY: ICD-10-CM

## 2018-04-30 DIAGNOSIS — Z13.6 SCREENING FOR CARDIOVASCULAR CONDITION: ICD-10-CM

## 2018-04-30 DIAGNOSIS — R73.09 ABNORMAL BLOOD SUGAR: ICD-10-CM

## 2018-04-30 DIAGNOSIS — E78.1 HYPERGLYCERIDEMIA: ICD-10-CM

## 2018-04-30 DIAGNOSIS — E03.9 HYPOTHYROIDISM, UNSPECIFIED TYPE: Primary | ICD-10-CM

## 2018-04-30 DIAGNOSIS — F41.8 DEPRESSION WITH ANXIETY: ICD-10-CM

## 2018-04-30 PROCEDURE — 99214 OFFICE O/P EST MOD 30 MIN: CPT | Performed by: FAMILY MEDICINE

## 2018-04-30 RX ORDER — BUPROPION HYDROCHLORIDE 150 MG/1
300 TABLET, EXTENDED RELEASE ORAL DAILY
Qty: 180 TABLET | Refills: 3 | OUTPATIENT
Start: 2018-04-30 | End: 2018-06-02 | Stop reason: SDUPTHER

## 2018-04-30 NOTE — PROGRESS NOTES
Assessment/Plan:    Problem List Items Addressed This Visit     Abnormal blood sugar     Check a1c         Relevant Orders    HEMOGLOBIN A1C W/ EAG ESTIMATION    Depression with anxiety    Relevant Medications    buPROPion (WELLBUTRIN SR) 150 mg 12 hr tablet    Hyperglyceridemia    Hypothyroidism - Primary     tsh stable         Vitamin D deficiency     Finished weekly  Take daily now- 6000           Other Visit Diagnoses     Screening for cardiovascular condition        Relevant Orders    Comprehensive metabolic panel    Lipid Panel with Direct LDL reflex          There are no Patient Instructions on file for this visit  Return in about 6 weeks (around 6/11/2018)  Subjective:      Patient ID: Rafaela Gnan is a 40 y o  female  Chief Complaint   Patient presents with    Follow-up     Patient here for follow up on Depression and Anxiety, hypothyroidism        Here for worsening depression  Seen by [facility]- therapist  Going through rough patch  OB ordered vitamin d and thyroid  Depression  Patient complains of depression  She complains of depressed mood and fatigue  Onset was approximately 1 year ago  Symptoms have been gradually worsening since that time  Current symptoms include: depressed mood and fatigue  Patient denies feelings of worthlessness/guilt and hopelessness  Family history significant for no psychiatric illness  Possible organic causes contributing are: none  Risk factors: negative life event marital events  Previous treatment includes medication  She complains of the following side effects from the treatment: none  The following portions of the patient's history were reviewed and updated as appropriate: allergies, current medications, past family history, past medical history, past social history, past surgical history and problem list     Review of Systems   Constitutional: Negative  HENT: Negative  Eyes: Negative  Respiratory: Negative  Cardiovascular: Negative  Gastrointestinal: Negative  Endocrine: Negative  Genitourinary: Negative  Musculoskeletal: Negative  Skin: Negative  Allergic/Immunologic: Negative  Neurological: Negative  Hematological: Negative  Psychiatric/Behavioral: Positive for dysphoric mood  Current Outpatient Prescriptions   Medication Sig Dispense Refill    ALPRAZolam (XANAX) 0 5 mg tablet Take 1 tablet by mouth 2 (two) times a day      buPROPion (WELLBUTRIN SR) 150 mg 12 hr tablet Take 2 tablets (300 mg total) by mouth daily for 90 days 180 tablet 3    drospirenone-ethinyl estradiol (BEATRIZ) 3-0 02 MG per tablet Take 1 tablet by mouth daily for 90 days 90 tablet 3    levothyroxine 112 mcg tablet Take 1 tablet (112 mcg total) by mouth daily for 270 days 90 tablet 2    Cholecalciferol (VITAMIN D3) 68195 units TABS Take 1 tablet (50,000 Units total) by mouth once a week for 8 doses 8 tablet 0    scopolamine (TRANSDERM-SCOP) 1 5 mg/3 days TD 72 hr patch Place on the skin       No current facility-administered medications for this visit  Objective:    /80   Pulse 88   Resp 16   Ht 5' 8" (1 727 m)   Wt 91 4 kg (201 lb 9 6 oz)   BMI 30 65 kg/m²        Physical Exam   Constitutional: She appears well-developed and well-nourished  Eyes: Pupils are equal, round, and reactive to light  Neck: Normal range of motion  Neck supple  Cardiovascular: Normal rate, regular rhythm, normal heart sounds and intact distal pulses  Pulmonary/Chest: Effort normal and breath sounds normal    Abdominal: Soft  Bowel sounds are normal    Musculoskeletal: Normal range of motion  Neurological: She is alert  Skin: Skin is warm  Nursing note and vitals reviewed               Pamela Yung DO

## 2018-06-01 DIAGNOSIS — F41.9 ANXIETY: Primary | ICD-10-CM

## 2018-06-01 RX ORDER — ALPRAZOLAM 0.5 MG/1
0.5 TABLET ORAL 2 TIMES DAILY
Qty: 60 TABLET | Refills: 0 | Status: SHIPPED | OUTPATIENT
Start: 2018-06-01 | End: 2018-06-18 | Stop reason: ALTCHOICE

## 2018-06-02 DIAGNOSIS — F41.8 DEPRESSION WITH ANXIETY: ICD-10-CM

## 2018-06-02 RX ORDER — BUPROPION HYDROCHLORIDE 150 MG/1
TABLET, EXTENDED RELEASE ORAL
Qty: 180 TABLET | Refills: 3 | Status: SHIPPED | OUTPATIENT
Start: 2018-06-02 | End: 2018-06-04 | Stop reason: SDUPTHER

## 2018-06-04 DIAGNOSIS — F41.8 DEPRESSION WITH ANXIETY: ICD-10-CM

## 2018-06-04 RX ORDER — BUPROPION HYDROCHLORIDE 150 MG/1
150 TABLET, EXTENDED RELEASE ORAL DAILY
Qty: 180 TABLET | Refills: 3 | Status: SHIPPED | OUTPATIENT
Start: 2018-06-04 | End: 2018-06-05 | Stop reason: SDUPTHER

## 2018-06-05 DIAGNOSIS — F41.8 DEPRESSION WITH ANXIETY: ICD-10-CM

## 2018-06-05 RX ORDER — BUPROPION HYDROCHLORIDE 150 MG/1
150 TABLET, EXTENDED RELEASE ORAL 2 TIMES DAILY
Qty: 180 TABLET | Refills: 3 | Status: SHIPPED | OUTPATIENT
Start: 2018-06-05 | End: 2019-03-11 | Stop reason: SDUPTHER

## 2018-06-15 ENCOUNTER — OFFICE VISIT (OUTPATIENT)
Dept: FAMILY MEDICINE CLINIC | Facility: CLINIC | Age: 43
End: 2018-06-15
Payer: COMMERCIAL

## 2018-06-15 VITALS
HEIGHT: 68 IN | SYSTOLIC BLOOD PRESSURE: 112 MMHG | HEART RATE: 94 BPM | RESPIRATION RATE: 12 BRPM | WEIGHT: 196.2 LBS | BODY MASS INDEX: 29.73 KG/M2 | DIASTOLIC BLOOD PRESSURE: 80 MMHG

## 2018-06-15 DIAGNOSIS — G43.809 OTHER MIGRAINE WITHOUT STATUS MIGRAINOSUS, NOT INTRACTABLE: Primary | ICD-10-CM

## 2018-06-15 PROCEDURE — 3008F BODY MASS INDEX DOCD: CPT | Performed by: NURSE PRACTITIONER

## 2018-06-15 PROCEDURE — 99213 OFFICE O/P EST LOW 20 MIN: CPT | Performed by: NURSE PRACTITIONER

## 2018-06-15 RX ORDER — ONDANSETRON 4 MG/1
4 TABLET, FILM COATED ORAL EVERY 8 HOURS PRN
Qty: 20 TABLET | Refills: 0 | Status: SHIPPED | OUTPATIENT
Start: 2018-06-15 | End: 2018-06-18 | Stop reason: ALTCHOICE

## 2018-06-15 RX ORDER — KETOROLAC TROMETHAMINE 30 MG/ML
60 INJECTION, SOLUTION INTRAMUSCULAR; INTRAVENOUS ONCE
Status: COMPLETED | OUTPATIENT
Start: 2018-06-15 | End: 2018-06-15

## 2018-06-15 RX ORDER — ELETRIPTAN HYDROBROMIDE 20 MG/1
20 TABLET, FILM COATED ORAL ONCE AS NEEDED
Qty: 9 TABLET | Refills: 0 | Status: SHIPPED | OUTPATIENT
Start: 2018-06-15 | End: 2018-06-18 | Stop reason: ALTCHOICE

## 2018-06-15 RX ADMIN — KETOROLAC TROMETHAMINE 60 MG: 30 INJECTION, SOLUTION INTRAMUSCULAR; INTRAVENOUS at 11:14

## 2018-06-15 NOTE — PROGRESS NOTES
Assessment/Plan:           Problem List Items Addressed This Visit        Cardiovascular and Mediastinum    Migraine - Primary    Relevant Medications    ketorolac (TORADOL) injection 60 mg (Completed)    ondansetron (ZOFRAN) 4 mg tablet    eletriptan (RELPAX) 20 MG tablet            Subjective:      Patient ID: Zaid Kee is a 40 y o  female  Here for c/o migraine that started on Saturday  Not worst ha of her life but in the top 2  Typical migraine ha, left side of head  Started to resolve on Tuesday but has not resolved  Vomited a few times  Most recent yesterday  Tried tylenol, motrin, excedrin migraine and vidodin with no relief  Hx of migraines but has been stable for years  Not due for menses, does not get anymore  Always is sleep deprived and is under stress at work but not any different than usual          The following portions of the patient's history were reviewed and updated as appropriate: allergies, current medications, past family history, past medical history, past social history, past surgical history and problem list     Review of Systems   Constitutional: Positive for fatigue  Negative for activity change and fever  HENT: Negative for congestion, rhinorrhea and sinus pain  Respiratory: Negative for cough, shortness of breath and stridor  Cardiovascular: Negative for chest pain and palpitations  Gastrointestinal: Positive for nausea and vomiting  Negative for constipation and diarrhea  Genitourinary: Negative for frequency, hematuria and urgency  Musculoskeletal: Negative for arthralgias, back pain and neck pain  Neurological: Positive for headaches  Negative for dizziness, tremors, seizures, syncope, facial asymmetry, speech difficulty, weakness and numbness  Hematological: Negative for adenopathy  Psychiatric/Behavioral: Negative for sleep disturbance  The patient is not nervous/anxious            Objective:      /80   Pulse 94   Resp 12   Ht 5' 8" (1 727 m) Wt 89 kg (196 lb 3 2 oz)   BMI 29 83 kg/m²   Family History   Problem Relation Age of Onset    Atrial fibrillation Mother     Hyperlipidemia Mother     Hypertension Mother     Supraventricular tachycardia Mother     Heart disease Mother     Diabetes Father     Hyperlipidemia Father     Hypertension Father     Arthritis Maternal Grandmother     Leukemia Maternal Grandmother     Stroke Maternal Grandfather     Pancreatic cancer Maternal Grandfather      Past Medical History:   Diagnosis Date    Arthritis     Asthma     Breast lump     resolved [id number]    Depression     Disease of thyroid gland     Hair loss     Hemorrhage of conjunctiva, left     last assessed [id number]    Lyme disease     lastt assessed [id number]    Thyroid disease      Social History     Social History    Marital status: /Civil Union     Spouse name: N/A    Number of children: N/A    Years of education: N/A     Occupational History    Not on file       Social History Main Topics    Smoking status: Never Smoker    Smokeless tobacco: Never Used    Alcohol use No    Drug use: No    Sexual activity: Yes     Partners: Male     Birth control/ protection: Pill     Other Topics Concern    Not on file     Social History Narrative    Caffeine use           Current Outpatient Prescriptions:     ALPRAZolam (XANAX) 0 5 mg tablet, Take 1 tablet (0 5 mg total) by mouth 2 (two) times a day, Disp: 60 tablet, Rfl: 0    buPROPion (WELLBUTRIN SR) 150 mg 12 hr tablet, Take 1 tablet (150 mg total) by mouth 2 (two) times a day, Disp: 180 tablet, Rfl: 3    Cholecalciferol (VITAMIN D3) 23992 units TABS, Take 1 tablet (50,000 Units total) by mouth once a week for 8 doses, Disp: 8 tablet, Rfl: 0    drospirenone-ethinyl estradiol (BEATRIZ) 3-0 02 MG per tablet, Take 1 tablet by mouth daily for 90 days, Disp: 90 tablet, Rfl: 3    eletriptan (RELPAX) 20 MG tablet, Take 1 tablet (20 mg total) by mouth once as needed for migraine for up to 1 dose may repeat in 2 hours if necessary, Disp: 9 tablet, Rfl: 0    GIANVI 3-0 02 MG per tablet, , Disp: , Rfl:     levothyroxine 112 mcg tablet, Take 1 tablet (112 mcg total) by mouth daily for 270 days, Disp: 90 tablet, Rfl: 2    ondansetron (ZOFRAN) 4 mg tablet, Take 1 tablet (4 mg total) by mouth every 8 (eight) hours as needed for nausea or vomiting, Disp: 20 tablet, Rfl: 0    scopolamine (TRANSDERM-SCOP) 1 5 mg/3 days TD 72 hr patch, Place on the skin, Disp: , Rfl:   Allergies   Allergen Reactions    Pollen Extract           Physical Exam   Constitutional: She is oriented to person, place, and time  She appears well-developed and well-nourished  HENT:   Head: Normocephalic and atraumatic  Right Ear: External ear normal    Left Ear: External ear normal    Nose: Nose normal    Mouth/Throat: Oropharynx is clear and moist    Eyes: Conjunctivae are normal    Neck: Normal range of motion  Neck supple  Cardiovascular: Normal rate, regular rhythm and normal heart sounds  Pulmonary/Chest: Effort normal and breath sounds normal    Abdominal: Soft  Bowel sounds are normal    Musculoskeletal: Normal range of motion  Neurological: She is alert and oriented to person, place, and time  Skin: Skin is warm and dry  Psychiatric: She has a normal mood and affect  Her behavior is normal  Judgment and thought content normal    Nursing note and vitals reviewed

## 2018-06-18 ENCOUNTER — TELEPHONE (OUTPATIENT)
Dept: FAMILY MEDICINE CLINIC | Facility: CLINIC | Age: 43
End: 2018-06-18

## 2018-06-18 ENCOUNTER — HOSPITAL ENCOUNTER (EMERGENCY)
Facility: HOSPITAL | Age: 43
Discharge: HOME | End: 2018-06-18
Attending: EMERGENCY MEDICINE | Admitting: EMERGENCY MEDICINE
Payer: COMMERCIAL

## 2018-06-18 VITALS
BODY MASS INDEX: 29.55 KG/M2 | HEART RATE: 82 BPM | HEIGHT: 68 IN | RESPIRATION RATE: 18 BRPM | WEIGHT: 195 LBS | DIASTOLIC BLOOD PRESSURE: 74 MMHG | SYSTOLIC BLOOD PRESSURE: 123 MMHG | TEMPERATURE: 98 F | OXYGEN SATURATION: 98 %

## 2018-06-18 DIAGNOSIS — R51.9 HEADACHE: Primary | ICD-10-CM

## 2018-06-18 PROCEDURE — 99283 EMERGENCY DEPT VISIT LOW MDM: CPT

## 2018-06-18 PROCEDURE — 96375 TX/PRO/DX INJ NEW DRUG ADDON: CPT

## 2018-06-18 PROCEDURE — 96361 HYDRATE IV INFUSION ADD-ON: CPT

## 2018-06-18 PROCEDURE — 96374 THER/PROPH/DIAG INJ IV PUSH: CPT

## 2018-06-18 RX ORDER — HALOPERIDOL 5 MG/ML
2 INJECTION INTRAMUSCULAR ONCE
Status: COMPLETED | OUTPATIENT
Start: 2018-06-18 | End: 2018-06-18

## 2018-06-18 RX ORDER — HALOPERIDOL 5 MG
2.5 TABLET ORAL ONCE
Status: DISCONTINUED | OUTPATIENT
Start: 2018-06-18 | End: 2018-06-18

## 2018-06-18 RX ORDER — KETOROLAC TROMETHAMINE 30 MG/ML
15 INJECTION, SOLUTION INTRAMUSCULAR; INTRAVENOUS ONCE
Status: COMPLETED | OUTPATIENT
Start: 2018-06-18 | End: 2018-06-18

## 2018-06-18 RX ORDER — METOCLOPRAMIDE HYDROCHLORIDE 5 MG/ML
10 INJECTION INTRAMUSCULAR; INTRAVENOUS ONCE
Status: COMPLETED | OUTPATIENT
Start: 2018-06-18 | End: 2018-06-18

## 2018-06-18 RX ADMIN — METOCLOPRAMIDE 10 MG: 5 INJECTION, SOLUTION INTRAMUSCULAR; INTRAVENOUS at 18:23

## 2018-06-18 RX ADMIN — HALOPERIDOL LACTATE 2 MG: 5 INJECTION, SOLUTION INTRAMUSCULAR at 20:17

## 2018-06-18 RX ADMIN — KETOROLAC TROMETHAMINE 15 MG: 30 INJECTION, SOLUTION INTRAMUSCULAR at 18:23

## 2018-06-18 RX ADMIN — SODIUM CHLORIDE 1000 ML: 0.9 INJECTION, SOLUTION INTRAVENOUS at 18:24

## 2018-06-18 NOTE — DISCHARGE INSTRUCTIONS
Please follow-up with your primary doctor and are neurology doctors for further management  Return to the ER for new or worrisome symptoms  Acute Headache   WHAT YOU NEED TO KNOW:   An acute headache is pain or discomfort that starts suddenly and gets worse quickly  You may have an acute headache only when you feel stress or eat certain foods  Other acute headache pain can happen every day, and sometimes several times a day  DISCHARGE INSTRUCTIONS:   Return to the emergency department if:   · You have severe pain  · You have numbness or weakness on one side of your face or body  · You have a headache that occurs after a blow to the head, a fall, or other trauma  · You have a headache, are forgetful or confused, or have trouble speaking  · You have a headache, stiff neck, and a fever  Contact your healthcare provider if:   · You have a constant headache and are vomiting  · You have a headache each day that does not get better, even after treatment  · You have changes in your headaches, or new symptoms that occur when you have a headache  · You have questions or concerns about your condition or care  Medicines: You may need any of the following:  · Prescription pain medicine  may be given  The medicine your healthcare provider recommends will depend on the kind of headaches you have  You will need to take prescription headache medicines as directed to prevent a problem called rebound headache  These headaches happen with regular use of pain relievers for headache disorders  · NSAIDs , such as ibuprofen, help decrease swelling, pain, and fever  This medicine is available with or without a doctor's order  NSAIDs can cause stomach bleeding or kidney problems in certain people  If you take blood thinner medicine, always ask your healthcare provider if NSAIDs are safe for you  Always read the medicine label and follow directions  · Acetaminophen  decreases pain and fever   It is available without a doctor's order  Ask how much to take and how often to take it  Follow directions  Read the labels of all other medicines you are using to see if they also contain acetaminophen, or ask your doctor or pharmacist  Acetaminophen can cause liver damage if not taken correctly  Do not use more than 3 grams (3,000 milligrams) total of acetaminophen in one day  · Antidepressants  may be given for some kinds of headaches  · Take your medicine as directed  Contact your healthcare provider if you think your medicine is not helping or if you have side effects  Tell him or her if you are allergic to any medicine  Keep a list of the medicines, vitamins, and herbs you take  Include the amounts, and when and why you take them  Bring the list or the pill bottles to follow-up visits  Carry your medicine list with you in case of an emergency  Manage your symptoms:   · Apply heat or ice  on the headache area  Use a heat or ice pack  For an ice pack, you can also put crushed ice in a plastic bag  Cover the pack or bag with a towel before you apply it to your skin  Ice and heat both help decrease pain, and heat also helps decrease muscle spasms  Apply heat for 20 to 30 minutes every 2 hours  Apply ice for 15 to 20 minutes every hour  Apply heat or ice for as long and for as many days as directed  You may alternate heat and ice  · Relax your muscles  Lie down in a comfortable position and close your eyes  Relax your muscles slowly  Start at your toes and work your way up your body  · Keep a record of your headaches  Write down when your headaches start and stop  Include your symptoms and what you were doing when the headache began  Record what you ate or drank for 24 hours before the headache started  Describe the pain and where it hurts  Keep track of what you did to treat your headache and if it worked  Prevent an acute headache:   · Avoid anything that triggers an acute headache    Examples include exposure to chemicals, going to high altitude, or not getting enough sleep  Create a regular sleep routine  Go to sleep at the same time and wake up at the same time each day  Do not use electronic devices before bedtime  These may trigger a headache or prevent you from sleeping well  · Do not smoke  Nicotine and other chemicals in cigarettes and cigars can trigger an acute headache or make it worse  Ask your healthcare provider for information if you currently smoke and need help to quit  E-cigarettes or smokeless tobacco still contain nicotine  Talk to your healthcare provider before you use these products  · Limit alcohol as directed  Alcohol can trigger an acute headache or make it worse  If you have cluster headaches, do not drink alcohol during an episode  For other types of headaches, ask your healthcare provider if it is safe for you to drink alcohol  Ask how much is safe for you to drink, and how often  · Exercise as directed  Exercise can reduce tension and help with headache pain  Aim for 30 minutes of physical activity on most days of the week  Your healthcare provider can help you create an exercise plan  · Eat a variety of healthy foods  Healthy foods include fruits, vegetables, low-fat dairy products, lean meats, fish, whole grains, and cooked beans  Your healthcare provider or dietitian can help you create meals plans if you need to avoid foods that trigger headaches  Follow up with your healthcare provider as directed:  Bring your headache record with you when you see your healthcare provider  Write down your questions so you remember to ask them during your visits  © 2017 [employer] Information is for End User's use only and may not be sold, redistributed or otherwise used for commercial purposes  All illustrations and images included in CareNotes® are the copyrighted property of A [employer]  or S[employer] Alan[employer]    The above information is an educational aid only  It is not intended as medical advice for individual conditions or treatments  Talk to your doctor, nurse or pharmacist before following any medical regimen to see if it is safe and effective for you

## 2018-06-18 NOTE — ED ATTENDING ATTESTATION
Hermilo Barbosa DO, saw and evaluated the patient  I have discussed the patient with the resident/non-physician practitioner and agree with the resident's/non-physician practitioner's findings, Plan of Care, and MDM as documented in the resident's/non-physician practitioner's note, except where noted  All available labs and Radiology studies were reviewed  At this point I agree with the current assessment done in the Emergency Department  I have conducted an independent evaluation of this patient a history and physical is as follows:      40 yof with gradually worsening HA for 7 days, not worst HA of life but is nauseated  No fevers  She has been seen by PCP  Past Medical History:   Diagnosis Date    Arthritis     Asthma     Breast lump     resolved [id number]    Depression     Disease of thyroid gland     Hair loss     Hemorrhage of conjunctiva, left     last assessed [id number]    Lyme disease     lastt assessed [id number]    Thyroid disease      /69 (BP Location: Right arm)   Pulse 84   Temp 98 °F (36 7 °C) (Oral)   Resp 17   Ht 5' 8" (1 727 m)   Wt 88 5 kg (195 lb)   SpO2 97%   BMI 29 65 kg/m²   A&Ox4  PERRLA  EOMI  Neck NT  CTA  RRR  Ab NT  Ambulates well    The patient states that the PA at her primary care office referred her to the emergency department for CT scan  I discussed with the patient that since her symptoms were ongoing for several days, a CT scan would not be the full evaluation  I discussed the differential diagnosis with her and explained to her that if there is any reason to think she might have a mass that an MRI would be more useful or CT scan with contrast   In addition, I a stated that if we were to work her up for an intracranial hemorrhage that a lumbar puncture would be necessary if the CT scan was negative  I offered her the CT scan and she declined  She did not wish to have any further evaluation    She reconfirmed that this was not the worst headache of her life and that was of gradual onset  She requested that she be given medications just to make her feel better which was reasonable  Patient was given Reglan and Toradol and did not have much relief  She was then given Haldol and had complete relief  She was discharged from the ED  Diagnosis  Headache    After discharge patient called back and stated that she had the feeling that she was going to crawl out of her skin and that she could not stop moving her feet  I explained to her that this sounded consistent with akathisia secondary to metoclopramide which she was given  I instructed her to take 50 mg of diphenhydramine and to come back to the emergency department if she was still symptomatic in 1 hour  12:00 AM  I called to follow up to see the patient had been feeling better after the diphenhydramine but there was no answer            Critical Care Time  CritCare Time    Procedures

## 2018-06-18 NOTE — TELEPHONE ENCOUNTER
I think she should go to the er for this, they can do it stat and make sure she is ok and does not need a spinal tap  julia

## 2018-06-19 NOTE — ED PROVIDER NOTES
History  Chief Complaint   Patient presents with    Migraine     Patient has had migraine x1 week, seen by PCP on Friday and given prescriptions, advised to come to ED today     45-year-old woman with a history of migraines, hypothyroidism, asthma, and depression presents for evaluation of a headache  Onset of symptoms was approximately 1 week ago without inciting trauma  Patient reports a biparietal pressure without radiation  She notes associated photophobia, nausea, and vomiting without fevers, neck pain/stiffness, abdominal pain, diarrhea, or urinary symptoms  She has been taking Excedrin migraine and ibuprofen without relief  She was evaluated by her PCP on Friday and was given a Triptan along with Toradol which improved her symptoms for approximately 1 hour  Symptoms are similar to previous migraines although this is lasting longer and associated with more nausea  Symptoms have progressed and were not maximal at onset  No history of cancer or IV drug use  On arrival, patient is afebrile mildly tachycardic with otherwise normal vital signs  Physical exam shows a well-appearing patient with a nonfocal neurologic exam and no signs of meningismus  Likely primary headache in her allergy  No indication for imaging or further workup  Will treat symptomatically and have patient follow up as an outpatient  Prior to Admission Medications   Prescriptions Last Dose Informant Patient Reported? Taking?    Cholecalciferol (VITAMIN D3) 17278 units TABS   No No   Sig: Take 1 tablet (50,000 Units total) by mouth once a week for 8 doses   buPROPion (WELLBUTRIN SR) 150 mg 12 hr tablet   No Yes   Sig: Take 1 tablet (150 mg total) by mouth 2 (two) times a day   drospirenone-ethinyl estradiol (BEATRIZ) 3-0 02 MG per tablet   No No   Sig: Take 1 tablet by mouth daily for 90 days   levothyroxine 112 mcg tablet   No Yes   Sig: Take 1 tablet (112 mcg total) by mouth daily for 270 days      Facility-Administered Medications: None       Past Medical History:   Diagnosis Date    Arthritis     Asthma     Breast lump     resolved [id number]    Depression     Disease of thyroid gland     Hair loss     Hemorrhage of conjunctiva, left     last assessed [id number]    Lyme disease     lastt assessed [id number]    Thyroid disease        Past Surgical History:   Procedure Laterality Date    LUMBAR FUSION  01/01/2004    Lumbar Vertebral Fusion;  2004       Family History   Problem Relation Age of Onset    Atrial fibrillation Mother     Hyperlipidemia Mother     Hypertension Mother     Supraventricular tachycardia Mother     Heart disease Mother     Diabetes Father     Hyperlipidemia Father     Hypertension Father     Arthritis Maternal Grandmother     Leukemia Maternal Grandmother     Stroke Maternal Grandfather     Pancreatic cancer Maternal Grandfather      I have reviewed and agree with the history as documented  Social History   Substance Use Topics    Smoking status: Never Smoker    Smokeless tobacco: Never Used    Alcohol use No        Review of Systems   Constitutional: Negative for chills and fever  HENT: Negative for rhinorrhea and sore throat  Eyes: Positive for photophobia  Negative for visual disturbance  Respiratory: Negative for cough and shortness of breath  Cardiovascular: Negative for chest pain and leg swelling  Gastrointestinal: Positive for nausea and vomiting  Negative for abdominal pain and diarrhea  Genitourinary: Negative for dysuria, frequency and hematuria  Musculoskeletal: Negative for back pain, neck pain and neck stiffness  Skin: Negative for rash and wound  Neurological: Positive for dizziness, light-headedness and headaches         Physical Exam  ED Triage Vitals [06/18/18 1537]   Temperature Pulse Respirations Blood Pressure SpO2   98 °F (36 7 °C) (!) 118 20 141/89 94 %      Temp Source Heart Rate Source Patient Position - Orthostatic VS BP Location FiO2 (%) Oral Monitor Sitting Right arm --      Pain Score       8           Orthostatic Vital Signs  Vitals:    06/18/18 1537 06/18/18 1841 06/18/18 1935   BP: 141/89 116/69 123/74   Pulse: (!) 118 84 82   Patient Position - Orthostatic VS: Sitting Lying Sitting       Physical Exam   Constitutional: She is oriented to person, place, and time  She appears well-developed and well-nourished  No distress  HENT:   Head: Normocephalic and atraumatic  Eyes: Conjunctivae and EOM are normal  Pupils are equal, round, and reactive to light  No scleral icterus  Neck: Neck supple  No JVD present  No signs of meningismus   Cardiovascular: Normal rate, regular rhythm and normal heart sounds  Exam reveals no gallop and no friction rub  No murmur heard  Pulmonary/Chest: Effort normal and breath sounds normal  No respiratory distress  She has no wheezes  She has no rales  Abdominal: Soft  She exhibits no distension  There is no tenderness  There is no rebound and no guarding  Musculoskeletal: She exhibits no edema or tenderness  Neurological: She is alert and oriented to person, place, and time  No cranial nerve deficit  No gross motor or sensory deficits, no abnormalities with cerebellar testing, ambulating without difficulty, visual fields intact, crisp optic discs   Skin: Skin is warm and dry  She is not diaphoretic  Psychiatric: She has a normal mood and affect  Her behavior is normal  Thought content normal    Vitals reviewed        ED Medications  Medications   sodium chloride 0 9 % bolus 1,000 mL (0 mL Intravenous Stopped 6/18/18 1929)   metoclopramide (REGLAN) injection 10 mg (10 mg Intravenous Given 6/18/18 1823)   ketorolac (TORADOL) injection 15 mg (15 mg Intravenous Given 6/18/18 1823)   haloperidol lactate (HALDOL) injection 2 mg (2 mg Intravenous Given by Other 6/18/18 2017)       Diagnostic Studies  Results Reviewed     None                 No orders to display         Procedures  Procedures      Phone Consults  ED Phone Contact    ED Course                               MDM  Number of Diagnoses or Management Options  Headache:   Diagnosis management comments: Likely primary headache in etiology  No headache red flags and patient with a normal exam   She was given IV fluids, Toradol, Reglan, and Haldol with significant symptomatic improvement  Patient was discharged with outpatient PCP and Neurology follow-up  CritCare Time    Disposition  Final diagnoses:   Headache     Time reflects when diagnosis was documented in both MDM as applicable and the Disposition within this note     Time User Action Codes Description Comment    6/18/2018  7:50 PM Ana Villeda Add [R51] Headache       ED Disposition     ED Disposition Condition Comment    Discharge  Ellie Cross discharge to home/self care      Condition at discharge: Good        Follow-up Information     Follow up With Specialties Details Why Contact Info    Lena Soares DO Family Medicine Schedule an appointment as soon as possible for a visit  [address] [place] [address]  [phone]      [facility] Schedule an appointment as soon as possible for a visit  [address] [facility]  [phone]          Discharge Medication List as of 6/18/2018  7:50 PM      CONTINUE these medications which have NOT CHANGED    Details   buPROPion (WELLBUTRIN SR) 150 mg 12 hr tablet Take 1 tablet (150 mg total) by mouth 2 (two) times a day, Starting Tue 6/5/2018, Normal      Cholecalciferol (VITAMIN D3) 77289 units TABS Take 1 tablet (50,000 Units total) by mouth once a week for 8 doses, Starting Fri 3/2/2018, Until Sat 4/21/2018, Normal      drospirenone-ethinyl estradiol (BEATRIZ) 3-0 02 MG per tablet Take 1 tablet by mouth daily for 90 days, Starting Thu 3/1/2018, Until Wed 5/30/2018, Normal      levothyroxine 112 mcg tablet Take 1 tablet (112 mcg total) by mouth daily for 270 days, Starting Thu 3/29/2018, Until Mon 12/24/2018, Phone In           No discharge procedures on file  ED Provider  Attending physically available and evaluated Amandaberhane Jennifer  I managed the patient along with the ED Attending      Electronically Signed by         Deneen Fisher MD  06/20/18 1016

## 2018-06-21 ENCOUNTER — TELEPHONE (OUTPATIENT)
Dept: FAMILY MEDICINE CLINIC | Facility: CLINIC | Age: 43
End: 2018-06-21

## 2018-06-21 DIAGNOSIS — G89.29 CHRONIC NONINTRACTABLE HEADACHE, UNSPECIFIED HEADACHE TYPE: Primary | ICD-10-CM

## 2018-06-21 DIAGNOSIS — R51.9 CHRONIC NONINTRACTABLE HEADACHE, UNSPECIFIED HEADACHE TYPE: Primary | ICD-10-CM

## 2018-06-21 RX ORDER — RIZATRIPTAN BENZOATE 10 MG/1
10 TABLET ORAL ONCE AS NEEDED
Qty: 16 TABLET | Refills: 1 | Status: SHIPPED | OUTPATIENT
Start: 2018-06-21 | End: 2020-04-06 | Stop reason: ALTCHOICE

## 2018-06-21 NOTE — TELEPHONE ENCOUNTER
Spoke with patient, did get some relief with meds from ed but ha came back  Did get relief from meds we gave her in office but capone came back  Unable to get triptan due to non-formulary, will call and get other formulary alternatives  Will order ct scan of head  julia

## 2018-06-22 ENCOUNTER — HOSPITAL ENCOUNTER (OUTPATIENT)
Dept: RADIOLOGY | Age: 43
Discharge: HOME | End: 2018-06-22
Payer: COMMERCIAL

## 2018-06-22 DIAGNOSIS — R51.9 CHRONIC NONINTRACTABLE HEADACHE, UNSPECIFIED HEADACHE TYPE: ICD-10-CM

## 2018-06-22 DIAGNOSIS — G89.29 CHRONIC NONINTRACTABLE HEADACHE, UNSPECIFIED HEADACHE TYPE: ICD-10-CM

## 2018-06-22 PROCEDURE — 70450 CT HEAD/BRAIN W/O DYE: CPT

## 2018-06-29 ENCOUNTER — TELEPHONE (OUTPATIENT)
Dept: FAMILY MEDICINE CLINIC | Facility: CLINIC | Age: 43
End: 2018-06-29

## 2018-06-29 NOTE — TELEPHONE ENCOUNTER
Alysha called saying the pre-authorization for eletriptan (RELPAX) 20 MG tablet has been denied  Please advise

## 2018-09-02 ENCOUNTER — APPOINTMENT (OUTPATIENT)
Dept: LAB | Facility: HOSPITAL | Age: 43
End: 2018-09-02
Payer: COMMERCIAL

## 2018-09-02 ENCOUNTER — TRANSCRIBE ORDERS (OUTPATIENT)
Dept: LAB | Facility: HOSPITAL | Age: 43
End: 2018-09-02

## 2018-09-02 DIAGNOSIS — Z00.8 HEALTH EXAMINATION IN POPULATION SURVEY: Primary | ICD-10-CM

## 2018-09-02 DIAGNOSIS — Z13.6 SCREENING FOR CARDIOVASCULAR CONDITION: ICD-10-CM

## 2018-09-02 DIAGNOSIS — Z00.8 HEALTH EXAMINATION IN POPULATION SURVEY: ICD-10-CM

## 2018-09-02 DIAGNOSIS — R73.09 ABNORMAL BLOOD SUGAR: ICD-10-CM

## 2018-09-02 LAB
ALBUMIN SERPL BCP-MCNC: 2.8 G/DL (ref 3.5–5)
ALP SERPL-CCNC: 93 U/L (ref 46–116)
ALT SERPL W P-5'-P-CCNC: 14 U/L (ref 12–78)
ANION GAP SERPL CALCULATED.3IONS-SCNC: 6 MMOL/L (ref 4–13)
AST SERPL W P-5'-P-CCNC: 14 U/L (ref 5–45)
BILIRUB SERPL-MCNC: 0.48 MG/DL (ref 0.2–1)
BUN SERPL-MCNC: 6 MG/DL (ref 5–25)
CALCIUM SERPL-MCNC: 9 MG/DL (ref 8.3–10.1)
CHLORIDE SERPL-SCNC: 106 MMOL/L (ref 100–108)
CHOLEST SERPL-MCNC: 232 MG/DL (ref 50–200)
CO2 SERPL-SCNC: 25 MMOL/L (ref 21–32)
CREAT SERPL-MCNC: 0.87 MG/DL (ref 0.6–1.3)
EST. AVERAGE GLUCOSE BLD GHB EST-MCNC: 114 MG/DL
GFR SERPL CREATININE-BSD FRML MDRD: 83 ML/MIN/1.73SQ M
GLUCOSE P FAST SERPL-MCNC: 92 MG/DL (ref 65–99)
HBA1C MFR BLD: 5.6 % (ref 4.2–6.3)
HDLC SERPL-MCNC: 76 MG/DL (ref 40–60)
LDLC SERPL CALC-MCNC: 103 MG/DL (ref 0–100)
POTASSIUM SERPL-SCNC: 4.1 MMOL/L (ref 3.5–5.3)
PROT SERPL-MCNC: 7.5 G/DL (ref 6.4–8.2)
SODIUM SERPL-SCNC: 137 MMOL/L (ref 136–145)
TRIGL SERPL-MCNC: 264 MG/DL

## 2018-09-02 PROCEDURE — 83036 HEMOGLOBIN GLYCOSYLATED A1C: CPT

## 2018-09-02 PROCEDURE — 80053 COMPREHEN METABOLIC PANEL: CPT

## 2018-09-02 PROCEDURE — 80061 LIPID PANEL: CPT

## 2018-09-02 PROCEDURE — 36415 COLL VENOUS BLD VENIPUNCTURE: CPT

## 2018-09-12 DIAGNOSIS — F41.1 GENERALIZED ANXIETY DISORDER: Primary | ICD-10-CM

## 2018-09-12 RX ORDER — ALPRAZOLAM 0.5 MG/1
0.5 TABLET ORAL
Qty: 30 TABLET | Refills: 0 | Status: SHIPPED | OUTPATIENT
Start: 2018-09-12 | End: 2018-12-06 | Stop reason: SDUPTHER

## 2018-10-26 ENCOUNTER — OFFICE VISIT (OUTPATIENT)
Dept: FAMILY MEDICINE CLINIC | Facility: CLINIC | Age: 43
End: 2018-10-26
Payer: COMMERCIAL

## 2018-10-26 VITALS
OXYGEN SATURATION: 98 % | HEIGHT: 68 IN | RESPIRATION RATE: 12 BRPM | DIASTOLIC BLOOD PRESSURE: 76 MMHG | SYSTOLIC BLOOD PRESSURE: 102 MMHG | HEART RATE: 115 BPM | TEMPERATURE: 99.6 F | BODY MASS INDEX: 31.25 KG/M2 | WEIGHT: 206.2 LBS

## 2018-10-26 DIAGNOSIS — J45.20 MILD INTERMITTENT ASTHMA WITHOUT COMPLICATION: Primary | ICD-10-CM

## 2018-10-26 PROCEDURE — 3008F BODY MASS INDEX DOCD: CPT | Performed by: NURSE PRACTITIONER

## 2018-10-26 PROCEDURE — 1036F TOBACCO NON-USER: CPT | Performed by: NURSE PRACTITIONER

## 2018-10-26 PROCEDURE — 99213 OFFICE O/P EST LOW 20 MIN: CPT | Performed by: NURSE PRACTITIONER

## 2018-10-26 RX ORDER — METHYLPREDNISOLONE 4 MG/1
TABLET ORAL
Qty: 21 TABLET | Refills: 0 | Status: SHIPPED | OUTPATIENT
Start: 2018-10-26 | End: 2019-01-28 | Stop reason: ALTCHOICE

## 2018-10-26 RX ORDER — ALBUTEROL SULFATE 90 UG/1
2 AEROSOL, METERED RESPIRATORY (INHALATION) EVERY 6 HOURS PRN
Qty: 8.5 G | Refills: 0 | Status: SHIPPED | OUTPATIENT
Start: 2018-10-26 | End: 2019-03-11 | Stop reason: SDUPTHER

## 2018-10-26 NOTE — PROGRESS NOTES
Assessment/Plan:         Diagnoses and all orders for this visit:    Mild intermittent asthma without complication  -     Methylprednisolone 4 MG TBPK; Use as directed on package  -     albuterol (PROAIR HFA) 90 mcg/act inhaler; Inhale 2 puffs every 6 (six) hours as needed for wheezing        Subjective:      Patient ID: Cristo Monteiro is a 40 y o  female  Here for asthma flare after multiple allergen exposure to mold and dust  Hx of asthma that was under control  Wheezing on and off for the past one month  Does not have any inhalers at home        Asthma   She complains of chest tightness, cough and wheezing  There is no difficulty breathing, frequent throat clearing, hemoptysis, hoarse voice, shortness of breath or sputum production  This is a recurrent problem  The current episode started in the past 7 days  The problem occurs every several days  The problem has been waxing and waning  The cough is non-productive  Pertinent negatives include no appetite change, chest pain, dyspnea on exertion, ear congestion, ear pain, fever, headaches, heartburn, malaise/fatigue, myalgias, nasal congestion, orthopnea, PND, postnasal drip, rhinorrhea, sneezing, sore throat, sweats or trouble swallowing  Her symptoms are aggravated by climbing stairs and occupational exposure  Her symptoms are alleviated by rest  Her past medical history is significant for asthma  The following portions of the patient's history were reviewed and updated as appropriate: allergies, current medications, past family history, past medical history, past social history, past surgical history and problem list     Review of Systems   Constitutional: Negative for appetite change, fatigue, fever and malaise/fatigue  HENT: Negative for ear pain, hoarse voice, postnasal drip, rhinorrhea, sneezing, sore throat and trouble swallowing  Eyes: Negative for photophobia and visual disturbance  Respiratory: Positive for cough and wheezing   Negative for hemoptysis, sputum production and shortness of breath  Cardiovascular: Negative for chest pain, dyspnea on exertion, palpitations and PND  Gastrointestinal: Negative for diarrhea, heartburn, nausea and vomiting  Genitourinary: Negative for dysuria and urgency  Musculoskeletal: Negative for arthralgias, myalgias and neck pain  Skin: Negative for rash  Neurological: Negative for dizziness, light-headedness and headaches  Hematological: Positive for adenopathy  Psychiatric/Behavioral: Negative for dysphoric mood  The patient is not nervous/anxious  Objective:      /76 (BP Location: Right arm, Patient Position: Sitting, Cuff Size: Standard)   Pulse (!) 115   Temp 99 6 °F (37 6 °C)   Resp 12   Ht 5' 8" (1 727 m)   Wt 93 5 kg (206 lb 3 2 oz)   SpO2 98%   BMI 31 35 kg/m²   Family History   Problem Relation Age of Onset    Atrial fibrillation Mother     Hyperlipidemia Mother     Hypertension Mother     Supraventricular tachycardia Mother     Heart disease Mother     Diabetes Father     Hyperlipidemia Father     Hypertension Father     Arthritis Maternal Grandmother     Leukemia Maternal Grandmother     Stroke Maternal Grandfather     Pancreatic cancer Maternal Grandfather      Past Medical History:   Diagnosis Date    Arthritis     Asthma     Breast lump     resolved [id number]    Depression     Disease of thyroid gland     Hair loss     Hemorrhage of conjunctiva, left     last assessed [id number]    Lyme disease     lastt assessed [id number]    Thyroid disease      Social History     Social History    Marital status: /Civil Union     Spouse name: N/A    Number of children: N/A    Years of education: N/A     Occupational History    Not on file       Social History Main Topics    Smoking status: Never Smoker    Smokeless tobacco: Never Used    Alcohol use No    Drug use: No    Sexual activity: Yes     Partners: Male     Birth control/ protection: Pill Other Topics Concern    Not on file     Social History Narrative    Caffeine use           Current Outpatient Prescriptions:     albuterol (PROAIR HFA) 90 mcg/act inhaler, Inhale 2 puffs every 6 (six) hours as needed for wheezing, Disp: 8 5 g, Rfl: 0    ALPRAZolam (XANAX) 0 5 mg tablet, Take 1 tablet (0 5 mg total) by mouth daily at bedtime as needed for anxiety for up to 30 days, Disp: 30 tablet, Rfl: 0    buPROPion (WELLBUTRIN SR) 150 mg 12 hr tablet, Take 1 tablet (150 mg total) by mouth 2 (two) times a day, Disp: 180 tablet, Rfl: 3    Cholecalciferol (VITAMIN D3) 67374 units TABS, Take 1 tablet (50,000 Units total) by mouth once a week for 8 doses, Disp: 8 tablet, Rfl: 0    drospirenone-ethinyl estradiol (BEATRIZ) 3-0 02 MG per tablet, Take 1 tablet by mouth daily for 90 days, Disp: 90 tablet, Rfl: 3    levothyroxine 112 mcg tablet, Take 1 tablet (112 mcg total) by mouth daily for 270 days, Disp: 90 tablet, Rfl: 2    Methylprednisolone 4 MG TBPK, Use as directed on package, Disp: 21 tablet, Rfl: 0    rizatriptan (MAXALT) 10 MG tablet, Take 1 tablet (10 mg total) by mouth once as needed for migraine for up to 1 dose, Disp: 16 tablet, Rfl: 1  Allergies   Allergen Reactions    Pollen Extract           Physical Exam   Constitutional: She appears well-developed and well-nourished  HENT:   Head: Normocephalic and atraumatic  Right Ear: External ear normal    Left Ear: External ear normal    Nose: Nose normal    Mouth/Throat: Oropharynx is clear and moist    Eyes: Conjunctivae are normal    Neck: Normal range of motion  Neck supple  No thyromegaly present  Cardiovascular: Normal rate, regular rhythm and normal heart sounds  Pulmonary/Chest: Effort normal  She has wheezes  Abdominal: Soft  Bowel sounds are normal    Musculoskeletal: Normal range of motion  Neurological: She is alert  Skin: Skin is warm and dry  Psychiatric: She has a normal mood and affect   Her behavior is normal  Judgment and thought content normal    Nursing note and vitals reviewed

## 2018-12-06 DIAGNOSIS — F41.1 GENERALIZED ANXIETY DISORDER: ICD-10-CM

## 2018-12-06 RX ORDER — ALPRAZOLAM 0.5 MG/1
TABLET ORAL
Qty: 30 TABLET | Refills: 0 | Status: SHIPPED | OUTPATIENT
Start: 2018-12-06 | End: 2019-03-11 | Stop reason: SDUPTHER

## 2019-01-16 ENCOUNTER — COSMETIC (OUTPATIENT)
Dept: PLASTIC SURGERY | Facility: HOSPITAL | Age: 44
End: 2019-01-16

## 2019-01-16 DIAGNOSIS — Z41.1 ENCOUNTER FOR COSMETIC PROCEDURE: Primary | ICD-10-CM

## 2019-01-16 PROCEDURE — BOTOX1U PR BOTOX BY THE UNIT: Performed by: SURGERY

## 2019-01-16 NOTE — PROGRESS NOTES
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

## 2019-01-28 ENCOUNTER — OFFICE VISIT (OUTPATIENT)
Dept: FAMILY MEDICINE CLINIC | Facility: CLINIC | Age: 44
End: 2019-01-28
Payer: COMMERCIAL

## 2019-01-28 VITALS
BODY MASS INDEX: 30.22 KG/M2 | OXYGEN SATURATION: 98 % | WEIGHT: 199.4 LBS | SYSTOLIC BLOOD PRESSURE: 120 MMHG | DIASTOLIC BLOOD PRESSURE: 60 MMHG | RESPIRATION RATE: 14 BRPM | HEIGHT: 68 IN | HEART RATE: 111 BPM | TEMPERATURE: 98 F

## 2019-01-28 DIAGNOSIS — E03.9 HYPOTHYROIDISM, UNSPECIFIED TYPE: ICD-10-CM

## 2019-01-28 DIAGNOSIS — J06.9 VIRAL UPPER RESPIRATORY TRACT INFECTION: Primary | ICD-10-CM

## 2019-01-28 PROCEDURE — 99213 OFFICE O/P EST LOW 20 MIN: CPT | Performed by: FAMILY MEDICINE

## 2019-01-28 PROCEDURE — 3008F BODY MASS INDEX DOCD: CPT | Performed by: FAMILY MEDICINE

## 2019-01-28 RX ORDER — LEVOTHYROXINE SODIUM 112 UG/1
112 TABLET ORAL DAILY
Qty: 90 TABLET | Refills: 3 | Status: SHIPPED | OUTPATIENT
Start: 2019-01-28 | End: 2019-03-11 | Stop reason: SDUPTHER

## 2019-01-28 NOTE — PROGRESS NOTES
Assessment/Plan:    Problem List Items Addressed This Visit     Viral upper respiratory tract infection - Primary     supprotive care  Fluids  decong               There are no Patient Instructions on file for this visit  No Follow-up on file  Subjective:      Patient ID: Jaciel Mercado is a 44 y o  female  Chief Complaint   Patient presents with   Colletta Claw Like Symptoms     Patient here with chills bodyaches sinus pressure post nasal stuffy and runny nose        Here for acute issue  3rd illness this year  Stress with divorce  Came home Saturday from [facility] off on the plane      URI    This is a new problem  The current episode started in the past 7 days  The problem has been gradually worsening  There has been no fever  Associated symptoms include congestion, headaches, a plugged ear sensation, rhinorrhea, sinus pain and a sore throat  Pertinent negatives include no abdominal pain, chest pain, coughing, diarrhea, dysuria, ear pain, nausea or vomiting  She has tried decongestant for the symptoms  The treatment provided mild relief  The following portions of the patient's history were reviewed and updated as appropriate:  past social history    Review of Systems   Constitutional: Positive for chills and fatigue  HENT: Positive for congestion, rhinorrhea, sinus pain and sore throat  Negative for ear pain  Eyes: Negative  Respiratory: Negative for cough  Cardiovascular: Negative for chest pain  Gastrointestinal: Negative for abdominal pain, diarrhea, nausea and vomiting  Endocrine: Negative  Genitourinary: Negative for dysuria  Musculoskeletal: Negative  Allergic/Immunologic: Negative  Neurological: Positive for headaches  Hematological: Negative  Psychiatric/Behavioral: Negative            Current Outpatient Prescriptions   Medication Sig Dispense Refill    albuterol (PROAIR HFA) 90 mcg/act inhaler Inhale 2 puffs every 6 (six) hours as needed for wheezing 8 5 g 0    ALPRAZolam (XANAX) 0 5 mg tablet TAKE ONE TABLET BY MOUTH EVERY DAY AT BEDTIME AS NEEDED FOR ANXIETY 30 tablet 0    buPROPion (WELLBUTRIN SR) 150 mg 12 hr tablet Take 1 tablet (150 mg total) by mouth 2 (two) times a day 180 tablet 3    rizatriptan (MAXALT) 10 MG tablet Take 1 tablet (10 mg total) by mouth once as needed for migraine for up to 1 dose 16 tablet 1    Cholecalciferol (VITAMIN D3) 64989 units TABS Take 1 tablet (50,000 Units total) by mouth once a week for 8 doses 8 tablet 0    drospirenone-ethinyl estradiol (BEATRIZ) 3-0 02 MG per tablet Take 1 tablet by mouth daily for 90 days 90 tablet 3    levothyroxine 112 mcg tablet Take 1 tablet (112 mcg total) by mouth daily for 270 days 90 tablet 2     No current facility-administered medications for this visit  Objective:    /60   Pulse (!) 111   Temp 98 °F (36 7 °C)   Resp 14   Ht 5' 8" (1 727 m)   Wt 90 4 kg (199 lb 6 4 oz)   SpO2 98%   BMI 30 32 kg/m²        Physical Exam   Constitutional: She appears well-developed and well-nourished  HENT:   Head: Normocephalic and atraumatic  Right Ear: External ear normal    Left Ear: External ear normal    Nose: Nose normal    Mouth/Throat: Oropharynx is clear and moist    Eyes: Pupils are equal, round, and reactive to light  EOM are normal    Neck: Normal range of motion  Neck supple  Cardiovascular: Normal rate, regular rhythm, normal heart sounds and intact distal pulses  Pulmonary/Chest: Effort normal and breath sounds normal    Abdominal: Soft  Bowel sounds are normal    Musculoskeletal: Normal range of motion  Lymphadenopathy:     She has cervical adenopathy  Neurological: She is alert  Skin: Skin is warm and dry  Nursing note and vitals reviewed               Navi Avila DO

## 2019-02-01 ENCOUNTER — TELEPHONE (OUTPATIENT)
Dept: FAMILY MEDICINE CLINIC | Facility: CLINIC | Age: 44
End: 2019-02-01

## 2019-02-01 DIAGNOSIS — J01.00 ACUTE MAXILLARY SINUSITIS, RECURRENCE NOT SPECIFIED: Primary | ICD-10-CM

## 2019-02-01 RX ORDER — AZITHROMYCIN 250 MG/1
TABLET, FILM COATED ORAL
Qty: 6 TABLET | Refills: 0 | Status: SHIPPED | OUTPATIENT
Start: 2019-02-01 | End: 2019-02-06

## 2019-02-01 NOTE — TELEPHONE ENCOUNTER
Ellie saw Dr Juliocesar King this week & she thought what she had was viral, but now her mucous is greenish/yellow so she thinks it might be infection? She is also going to [facility] next week so she is asking if you could call an antibiotic in to [facility]? Thank you!

## 2019-03-04 DIAGNOSIS — Z30.41 ENCOUNTER FOR SURVEILLANCE OF CONTRACEPTIVE PILLS: ICD-10-CM

## 2019-03-04 DIAGNOSIS — Z12.39 SCREENING FOR MALIGNANT NEOPLASM OF BREAST: Primary | ICD-10-CM

## 2019-03-04 RX ORDER — DROSPIRENONE AND ETHINYL ESTRADIOL 0.02-3(28)
1 KIT ORAL DAILY
Qty: 90 TABLET | Refills: 3 | Status: SHIPPED | OUTPATIENT
Start: 2019-03-04 | End: 2020-02-01 | Stop reason: SDUPTHER

## 2019-03-06 ENCOUNTER — HOSPITAL ENCOUNTER (OUTPATIENT)
Dept: RADIOLOGY | Age: 44
Discharge: HOME | End: 2019-03-06
Payer: COMMERCIAL

## 2019-03-06 VITALS — WEIGHT: 206 LBS | BODY MASS INDEX: 31.22 KG/M2 | HEIGHT: 68 IN

## 2019-03-06 DIAGNOSIS — Z12.31 ENCOUNTER FOR SCREENING MAMMOGRAM FOR MALIGNANT NEOPLASM OF BREAST: ICD-10-CM

## 2019-03-06 PROCEDURE — 77063 BREAST TOMOSYNTHESIS BI: CPT

## 2019-03-06 PROCEDURE — 77067 SCR MAMMO BI INCL CAD: CPT

## 2019-03-07 ENCOUNTER — ANNUAL EXAM (OUTPATIENT)
Dept: OBGYN CLINIC | Facility: CLINIC | Age: 44
End: 2019-03-07
Payer: COMMERCIAL

## 2019-03-07 ENCOUNTER — TELEPHONE (OUTPATIENT)
Dept: OBGYN CLINIC | Facility: CLINIC | Age: 44
End: 2019-03-07

## 2019-03-07 VITALS
BODY MASS INDEX: 30.62 KG/M2 | SYSTOLIC BLOOD PRESSURE: 112 MMHG | WEIGHT: 202 LBS | DIASTOLIC BLOOD PRESSURE: 80 MMHG | HEIGHT: 68 IN

## 2019-03-07 DIAGNOSIS — Z01.419 NORMAL PELVIC EXAM: Primary | ICD-10-CM

## 2019-03-07 PROCEDURE — G0145 SCR C/V CYTO,THINLAYER,RESCR: HCPCS | Performed by: PHYSICIAN ASSISTANT

## 2019-03-07 PROCEDURE — 99396 PREV VISIT EST AGE 40-64: CPT | Performed by: PHYSICIAN ASSISTANT

## 2019-03-07 NOTE — PROGRESS NOTES
Assessment/Plan   Problem List Items Addressed This Visit        Other    Normal pelvic exam - Primary     Pap guidelines reviewed  Pap with reflex done today  Will plan to continue Darline OCP  Will return to office for polyp removal    Return to office for annual or as needed  Relevant Orders    Liquid-based pap, screening          Subjective:     Patient ID: Claudia Freitas is a 44 y o  y o  female  HPI  43 yo seen for annual exam  Currently on Darline OCP, tolerating well would like to continue  Denies bowel or bladder issues  Patient's father and grandmother passed away this year, patient currently going through a divorce  Following closely with counselor  Last pap: 3/1/2018 NILM (-)HRHPV  Last mammogram: 3/6/2019 BIRADS 2: Benign  The following portions of the patient's history were reviewed and updated as appropriate:   She  has a past medical history of Arthritis, Asthma, Breast lump, Depression, Disease of thyroid gland, Hair loss, Hemorrhage of conjunctiva, left, Lyme disease, and Thyroid disease    She   Patient Active Problem List    Diagnosis Date Noted    Normal pelvic exam 03/07/2019    Viral upper respiratory tract infection 01/28/2019    Mild intermittent asthma without complication 64/50/5976    Cervical polyp 03/01/2018    Visit for gynecologic examination 03/01/2018    Age-related facial wrinkles 06/21/2017    Iron deficiency 04/28/2017    Atypical glandular cell changes cervix of undetermined significance favor dysplasia 03/20/2017    Abnormal mammogram of right breast 02/03/2017    Deviated nasal septum 01/09/2017    Abnormal blood sugar 04/14/2016    Obesity 04/14/2016    Primary osteoarthritis of left knee 07/29/2015    Hyperglyceridemia 03/12/2015    Herpes labialis 12/18/2014    Vitamin B12 deficiency 07/28/2014    Vitamin D deficiency 07/28/2014    Depression with anxiety 02/05/2013    Allergic rhinitis 07/09/2012    Fibromyalgia 07/09/2012    Hypothyroidism 2012    Migraine 2012     She  has a past surgical history that includes Lumbar fusion (2004)  Her family history includes Arthritis in her maternal grandmother; Atrial fibrillation in her mother; Diabetes in her father; Heart disease in her mother; Hyperlipidemia in her father and mother; Hypertension in her father and mother; Leukemia in her maternal grandmother; Pancreatic cancer (age of onset: 72) in her maternal grandfather; Stroke in her maternal grandfather; Supraventricular tachycardia in her mother  She  reports that she has never smoked  She has never used smokeless tobacco  She reports that she drinks alcohol  She reports that she does not use drugs  Current Outpatient Medications   Medication Sig Dispense Refill    albuterol (PROAIR HFA) 90 mcg/act inhaler Inhale 2 puffs every 6 (six) hours as needed for wheezing 8 5 g 0    ALPRAZolam (XANAX) 0 5 mg tablet TAKE ONE TABLET BY MOUTH EVERY DAY AT BEDTIME AS NEEDED FOR ANXIETY 30 tablet 0    buPROPion (WELLBUTRIN SR) 150 mg 12 hr tablet Take 1 tablet (150 mg total) by mouth 2 (two) times a day 180 tablet 3    Cholecalciferol (VITAMIN D3) 31429 units TABS Take 1 tablet (50,000 Units total) by mouth once a week for 8 doses 8 tablet 0    drospirenone-ethinyl estradiol (BEATRIZ) 3-0 02 MG per tablet Take 1 tablet by mouth daily for 90 days 90 tablet 3    levothyroxine 112 mcg tablet Take 1 tablet (112 mcg total) by mouth daily for 270 days 90 tablet 3    rizatriptan (MAXALT) 10 MG tablet Take 1 tablet (10 mg total) by mouth once as needed for migraine for up to 1 dose 16 tablet 1     No current facility-administered medications for this visit  She is allergic to pollen extract       Menstrual History:  OB History        1    Para   1    Term   1            AB        Living   1       SAB        TAB        Ectopic        Multiple        Live Births   1                Menarche age: 16  No LMP recorded  (Menstrual status: Birth Control)  Review of Systems   Constitutional: Negative for fatigue, fever and unexpected weight change  HENT: Negative for dental problem and sinus pressure  Eyes: Negative for visual disturbance  Respiratory: Negative for cough, shortness of breath and wheezing  Cardiovascular: Negative for chest pain  Gastrointestinal: Negative for abdominal pain, blood in stool, constipation, diarrhea, nausea and vomiting  Endocrine: Negative for polydipsia  Genitourinary: Negative for difficulty urinating, dyspareunia, dysuria, frequency, hematuria, pelvic pain and urgency  Musculoskeletal: Negative for arthralgias and back pain  Neurological: Negative for dizziness, seizures, light-headedness and headaches  Psychiatric/Behavioral: Negative for suicidal ideas  The patient is not nervous/anxious  Objective:  Vitals:    03/07/19 1042   BP: 112/80   BP Location: Left arm   Patient Position: Sitting   Cuff Size: Large   Weight: 91 6 kg (202 lb)   Height: 5' 8" (1 727 m)      Physical Exam   Constitutional: She is oriented to person, place, and time  She appears well-developed and well-nourished  Genitourinary: Rectum normal, vagina normal and uterus normal  There is no rash, tenderness, lesion, injury or Bartholin's cyst on the right labia  There is no rash, tenderness, lesion, injury or Bartholin's cyst on the left labia  Vagina exhibits no lesion  No erythema, tenderness or bleeding in the vagina  No signs of injury around the vagina  No vaginal discharge found  Right adnexum does not display mass, does not display tenderness and does not display fullness  Left adnexum does not display mass, does not display tenderness and does not display fullness  Cervix exhibits polyp (small 0 5 cm polyp )  Cervix does not exhibit motion tenderness, lesion or discharge  Uterus is not enlarged, tender, exhibiting a mass, irregular (is regular) or mobile   Rectal exam shows no external hemorrhoid, no internal hemorrhoid, no fissure, no mass, no tenderness, anal tone normal and guaiac negative stool  HENT:   Head: Normocephalic and atraumatic  Neck: No thyromegaly present  Cardiovascular: Normal rate, regular rhythm and normal heart sounds  Exam reveals no gallop and no friction rub  No murmur heard  Pulmonary/Chest: Effort normal and breath sounds normal  No respiratory distress  She has no wheezes  Right breast exhibits no inverted nipple, no mass, no nipple discharge, no skin change and no tenderness  Left breast exhibits no inverted nipple, no mass, no nipple discharge, no skin change and no tenderness  No breast swelling, tenderness, discharge or bleeding  Breasts are symmetrical    Abdominal: Soft  She exhibits no distension and no mass  There is no tenderness  There is no rebound and no guarding  No hernia  Lymphadenopathy:     She has no cervical adenopathy  Right: No inguinal adenopathy present  Left: No inguinal adenopathy present  Neurological: She is alert and oriented to person, place, and time  Skin: Skin is warm and dry  Psychiatric: She has a normal mood and affect   Her behavior is normal

## 2019-03-07 NOTE — ASSESSMENT & PLAN NOTE
Pap guidelines reviewed  Pap with reflex done today  Will plan to continue Darline OCP  Will return to office for polyp removal    Return to office for annual or as needed

## 2019-03-10 DIAGNOSIS — F41.1 GENERALIZED ANXIETY DISORDER: ICD-10-CM

## 2019-03-10 DIAGNOSIS — E03.9 HYPOTHYROIDISM, UNSPECIFIED TYPE: ICD-10-CM

## 2019-03-10 DIAGNOSIS — F41.8 DEPRESSION WITH ANXIETY: ICD-10-CM

## 2019-03-10 DIAGNOSIS — J45.20 MILD INTERMITTENT ASTHMA WITHOUT COMPLICATION: ICD-10-CM

## 2019-03-10 RX ORDER — BUPROPION HYDROCHLORIDE 150 MG/1
150 TABLET, EXTENDED RELEASE ORAL 2 TIMES DAILY
Qty: 180 TABLET | Refills: 0 | Status: CANCELLED | OUTPATIENT
Start: 2019-03-10

## 2019-03-10 RX ORDER — LEVOTHYROXINE SODIUM 112 UG/1
112 TABLET ORAL DAILY
Qty: 90 TABLET | Refills: 0 | Status: CANCELLED | OUTPATIENT
Start: 2019-03-10 | End: 2019-12-05

## 2019-03-10 RX ORDER — ALBUTEROL SULFATE 90 UG/1
2 AEROSOL, METERED RESPIRATORY (INHALATION) EVERY 6 HOURS PRN
Qty: 8.5 G | Refills: 0 | Status: CANCELLED | OUTPATIENT
Start: 2019-03-10

## 2019-03-10 RX ORDER — ALPRAZOLAM 0.5 MG/1
TABLET ORAL
Qty: 30 TABLET | Refills: 0 | Status: CANCELLED | OUTPATIENT
Start: 2019-03-10

## 2019-03-11 DIAGNOSIS — F41.8 DEPRESSION WITH ANXIETY: ICD-10-CM

## 2019-03-11 DIAGNOSIS — F41.1 GENERALIZED ANXIETY DISORDER: ICD-10-CM

## 2019-03-11 DIAGNOSIS — E03.9 HYPOTHYROIDISM, UNSPECIFIED TYPE: ICD-10-CM

## 2019-03-11 DIAGNOSIS — J45.20 MILD INTERMITTENT ASTHMA WITHOUT COMPLICATION: ICD-10-CM

## 2019-03-12 LAB
LAB AP GYN PRIMARY INTERPRETATION: NORMAL
LAB AP LMP: NORMAL
Lab: NORMAL

## 2019-03-12 RX ORDER — BUPROPION HYDROCHLORIDE 150 MG/1
150 TABLET, EXTENDED RELEASE ORAL 2 TIMES DAILY
Qty: 180 TABLET | Refills: 3 | Status: SHIPPED | OUTPATIENT
Start: 2019-03-12 | End: 2020-02-03 | Stop reason: SDUPTHER

## 2019-03-12 RX ORDER — LEVOTHYROXINE SODIUM 112 UG/1
112 TABLET ORAL DAILY
Qty: 90 TABLET | Refills: 3 | Status: SHIPPED | OUTPATIENT
Start: 2019-03-12 | End: 2019-05-08 | Stop reason: SDUPTHER

## 2019-03-12 RX ORDER — ALPRAZOLAM 0.5 MG/1
0.5 TABLET ORAL
Qty: 30 TABLET | Refills: 0 | Status: SHIPPED | OUTPATIENT
Start: 2019-03-12 | End: 2019-05-08 | Stop reason: SDUPTHER

## 2019-03-12 RX ORDER — ALBUTEROL SULFATE 90 UG/1
2 AEROSOL, METERED RESPIRATORY (INHALATION) EVERY 6 HOURS PRN
Qty: 8.5 G | Refills: 5 | Status: SHIPPED | OUTPATIENT
Start: 2019-03-12 | End: 2019-05-08 | Stop reason: SDUPTHER

## 2019-04-08 ENCOUNTER — PROCEDURE VISIT (OUTPATIENT)
Dept: OBGYN CLINIC | Facility: CLINIC | Age: 44
End: 2019-04-08
Payer: COMMERCIAL

## 2019-04-08 VITALS
DIASTOLIC BLOOD PRESSURE: 76 MMHG | HEIGHT: 68 IN | BODY MASS INDEX: 31.52 KG/M2 | WEIGHT: 208 LBS | SYSTOLIC BLOOD PRESSURE: 112 MMHG

## 2019-04-08 DIAGNOSIS — N84.1 CERVICAL POLYP: Primary | ICD-10-CM

## 2019-04-08 PROCEDURE — 88305 TISSUE EXAM BY PATHOLOGIST: CPT | Performed by: PATHOLOGY

## 2019-04-08 PROCEDURE — 99213 OFFICE O/P EST LOW 20 MIN: CPT | Performed by: OBSTETRICS & GYNECOLOGY

## 2019-04-20 ENCOUNTER — HOSPITAL ENCOUNTER (EMERGENCY)
Facility: HOSPITAL | Age: 44
Discharge: HOME | End: 2019-04-20
Attending: EMERGENCY MEDICINE | Admitting: EMERGENCY MEDICINE
Payer: COMMERCIAL

## 2019-04-20 VITALS
RESPIRATION RATE: 18 BRPM | OXYGEN SATURATION: 98 % | HEART RATE: 103 BPM | BODY MASS INDEX: 31.88 KG/M2 | SYSTOLIC BLOOD PRESSURE: 158 MMHG | DIASTOLIC BLOOD PRESSURE: 95 MMHG | HEIGHT: 68 IN | TEMPERATURE: 98 F | WEIGHT: 210.32 LBS

## 2019-04-20 DIAGNOSIS — T30.0 BURN: Primary | ICD-10-CM

## 2019-04-20 PROCEDURE — 99283 EMERGENCY DEPT VISIT LOW MDM: CPT

## 2019-04-20 PROCEDURE — 96374 THER/PROPH/DIAG INJ IV PUSH: CPT

## 2019-04-20 PROCEDURE — 90715 TDAP VACCINE 7 YRS/> IM: CPT | Performed by: EMERGENCY MEDICINE

## 2019-04-20 PROCEDURE — 99284 EMERGENCY DEPT VISIT MOD MDM: CPT | Performed by: EMERGENCY MEDICINE

## 2019-04-20 PROCEDURE — 90471 IMMUNIZATION ADMIN: CPT

## 2019-04-20 RX ORDER — OXYCODONE HYDROCHLORIDE 5 MG/1
5 TABLET ORAL EVERY 4 HOURS PRN
Qty: 12 TABLET | Refills: 0 | Status: SHIPPED | OUTPATIENT
Start: 2019-04-20 | End: 2019-04-30

## 2019-04-20 RX ORDER — MORPHINE SULFATE 10 MG/ML
6 INJECTION, SOLUTION INTRAMUSCULAR; INTRAVENOUS ONCE
Status: COMPLETED | OUTPATIENT
Start: 2019-04-20 | End: 2019-04-20

## 2019-04-20 RX ORDER — IBUPROFEN 600 MG/1
600 TABLET ORAL EVERY 6 HOURS PRN
Qty: 30 TABLET | Refills: 0 | Status: SHIPPED | OUTPATIENT
Start: 2019-04-20 | End: 2020-04-06 | Stop reason: ALTCHOICE

## 2019-04-20 RX ORDER — BACITRACIN ZINC AND POLYMYXIN B SULFATE 500; 1000 [USP'U]/G; [USP'U]/G
OINTMENT TOPICAL 2 TIMES DAILY
Qty: 30 G | Refills: 0 | Status: SHIPPED | OUTPATIENT
Start: 2019-04-20 | End: 2020-04-06 | Stop reason: ALTCHOICE

## 2019-04-20 RX ORDER — GINSENG 100 MG
1 CAPSULE ORAL ONCE
Status: COMPLETED | OUTPATIENT
Start: 2019-04-20 | End: 2019-04-20

## 2019-04-20 RX ADMIN — BACITRACIN ZINC 1 LARGE APPLICATION: 500 OINTMENT TOPICAL at 21:10

## 2019-04-20 RX ADMIN — MORPHINE SULFATE 6 MG: 10 INJECTION INTRAVENOUS at 21:00

## 2019-04-20 RX ADMIN — TETANUS TOXOID, REDUCED DIPHTHERIA TOXOID AND ACELLULAR PERTUSSIS VACCINE, ADSORBED 0.5 ML: 5; 2.5; 8; 8; 2.5 SUSPENSION INTRAMUSCULAR at 21:07

## 2019-04-30 ENCOUNTER — TRANSCRIBE ORDERS (OUTPATIENT)
Dept: ADMINISTRATIVE | Age: 44
End: 2019-04-30

## 2019-04-30 ENCOUNTER — APPOINTMENT (OUTPATIENT)
Dept: RADIOLOGY | Age: 44
End: 2019-04-30
Attending: FAMILY MEDICINE
Payer: COMMERCIAL

## 2019-04-30 ENCOUNTER — OFFICE VISIT (OUTPATIENT)
Dept: URGENT CARE | Age: 44
End: 2019-04-30
Payer: COMMERCIAL

## 2019-04-30 VITALS
HEIGHT: 68 IN | TEMPERATURE: 98 F | BODY MASS INDEX: 31.07 KG/M2 | DIASTOLIC BLOOD PRESSURE: 73 MMHG | WEIGHT: 205 LBS | SYSTOLIC BLOOD PRESSURE: 130 MMHG | HEART RATE: 94 BPM | RESPIRATION RATE: 18 BRPM | OXYGEN SATURATION: 99 %

## 2019-04-30 DIAGNOSIS — S69.91XA INJURY OF RIGHT WRIST, INITIAL ENCOUNTER: ICD-10-CM

## 2019-04-30 DIAGNOSIS — S52.501A CLOSED FRACTURE OF DISTAL END OF RIGHT RADIUS, UNSPECIFIED FRACTURE MORPHOLOGY, INITIAL ENCOUNTER: Primary | ICD-10-CM

## 2019-04-30 PROCEDURE — 73130 X-RAY EXAM OF HAND: CPT

## 2019-04-30 PROCEDURE — 29125 APPL SHORT ARM SPLINT STATIC: CPT | Performed by: FAMILY MEDICINE

## 2019-04-30 PROCEDURE — S9088 SERVICES PROVIDED IN URGENT: HCPCS | Performed by: FAMILY MEDICINE

## 2019-04-30 PROCEDURE — 99213 OFFICE O/P EST LOW 20 MIN: CPT | Performed by: FAMILY MEDICINE

## 2019-04-30 PROCEDURE — 73110 X-RAY EXAM OF WRIST: CPT

## 2019-05-01 ENCOUNTER — OFFICE VISIT (OUTPATIENT)
Dept: OBGYN CLINIC | Facility: HOSPITAL | Age: 44
End: 2019-05-01
Payer: COMMERCIAL

## 2019-05-01 VITALS
HEIGHT: 68 IN | SYSTOLIC BLOOD PRESSURE: 133 MMHG | DIASTOLIC BLOOD PRESSURE: 85 MMHG | HEART RATE: 97 BPM | BODY MASS INDEX: 30.31 KG/M2 | WEIGHT: 200 LBS

## 2019-05-01 DIAGNOSIS — S52.501A CLOSED FRACTURE OF DISTAL END OF RIGHT RADIUS, UNSPECIFIED FRACTURE MORPHOLOGY, INITIAL ENCOUNTER: ICD-10-CM

## 2019-05-01 DIAGNOSIS — S63.501A WRIST SPRAIN, RIGHT, INITIAL ENCOUNTER: Primary | ICD-10-CM

## 2019-05-01 PROCEDURE — 99242 OFF/OP CONSLTJ NEW/EST SF 20: CPT | Performed by: SURGERY

## 2019-05-08 DIAGNOSIS — E03.9 HYPOTHYROIDISM, UNSPECIFIED TYPE: ICD-10-CM

## 2019-05-08 DIAGNOSIS — F41.1 GENERALIZED ANXIETY DISORDER: ICD-10-CM

## 2019-05-08 DIAGNOSIS — J45.20 MILD INTERMITTENT ASTHMA WITHOUT COMPLICATION: ICD-10-CM

## 2019-05-08 RX ORDER — ALPRAZOLAM 0.5 MG/1
0.5 TABLET ORAL
Qty: 30 TABLET | Refills: 0 | Status: SHIPPED | OUTPATIENT
Start: 2019-05-08 | End: 2019-07-11 | Stop reason: SDUPTHER

## 2019-05-08 RX ORDER — LEVOTHYROXINE SODIUM 112 UG/1
112 TABLET ORAL DAILY
Qty: 90 TABLET | Refills: 3 | Status: SHIPPED | OUTPATIENT
Start: 2019-05-08 | End: 2020-02-03 | Stop reason: SDUPTHER

## 2019-05-08 RX ORDER — ALBUTEROL SULFATE 90 UG/1
2 AEROSOL, METERED RESPIRATORY (INHALATION) EVERY 6 HOURS PRN
Qty: 8.5 G | Refills: 3 | Status: SHIPPED | OUTPATIENT
Start: 2019-05-08 | End: 2019-07-11 | Stop reason: SDUPTHER

## 2019-05-15 ENCOUNTER — OFFICE VISIT (OUTPATIENT)
Dept: DERMATOLOGY | Facility: CLINIC | Age: 44
End: 2019-05-15
Payer: COMMERCIAL

## 2019-05-15 ENCOUNTER — HOSPITAL ENCOUNTER (OUTPATIENT)
Dept: RADIOLOGY | Facility: HOSPITAL | Age: 44
Discharge: HOME | End: 2019-05-15
Attending: SURGERY
Payer: COMMERCIAL

## 2019-05-15 ENCOUNTER — OFFICE VISIT (OUTPATIENT)
Dept: OBGYN CLINIC | Facility: HOSPITAL | Age: 44
End: 2019-05-15
Attending: FAMILY MEDICINE
Payer: COMMERCIAL

## 2019-05-15 VITALS
SYSTOLIC BLOOD PRESSURE: 130 MMHG | HEART RATE: 105 BPM | HEIGHT: 68 IN | DIASTOLIC BLOOD PRESSURE: 84 MMHG | WEIGHT: 200 LBS | BODY MASS INDEX: 30.31 KG/M2

## 2019-05-15 VITALS — HEIGHT: 70 IN | TEMPERATURE: 99.2 F | BODY MASS INDEX: 29.35 KG/M2 | WEIGHT: 205 LBS

## 2019-05-15 DIAGNOSIS — M77.8 TENDONITIS OF WRIST, RIGHT: Primary | ICD-10-CM

## 2019-05-15 DIAGNOSIS — S52.501A CLOSED FRACTURE OF DISTAL END OF RIGHT RADIUS, UNSPECIFIED FRACTURE MORPHOLOGY, INITIAL ENCOUNTER: ICD-10-CM

## 2019-05-15 DIAGNOSIS — B07.8 FLAT WART: Primary | ICD-10-CM

## 2019-05-15 PROCEDURE — 17110 DESTRUCTION B9 LES UP TO 14: CPT | Performed by: DERMATOLOGY

## 2019-05-15 PROCEDURE — 99213 OFFICE O/P EST LOW 20 MIN: CPT | Performed by: SURGERY

## 2019-05-15 PROCEDURE — 73110 X-RAY EXAM OF WRIST: CPT

## 2019-05-15 PROCEDURE — 99203 OFFICE O/P NEW LOW 30 MIN: CPT | Performed by: DERMATOLOGY

## 2019-06-12 ENCOUNTER — OFFICE VISIT (OUTPATIENT)
Dept: OBGYN CLINIC | Facility: CLINIC | Age: 44
End: 2019-06-12
Payer: COMMERCIAL

## 2019-06-12 VITALS
DIASTOLIC BLOOD PRESSURE: 86 MMHG | SYSTOLIC BLOOD PRESSURE: 130 MMHG | HEIGHT: 68 IN | HEART RATE: 92 BPM | BODY MASS INDEX: 30.31 KG/M2 | WEIGHT: 200 LBS

## 2019-06-12 DIAGNOSIS — S69.81XD TFCC (TRIANGULAR FIBROCARTILAGE COMPLEX) INJURY, RIGHT, SUBSEQUENT ENCOUNTER: Primary | ICD-10-CM

## 2019-06-12 PROCEDURE — 20605 DRAIN/INJ JOINT/BURSA W/O US: CPT | Performed by: SURGERY

## 2019-06-12 PROCEDURE — 99213 OFFICE O/P EST LOW 20 MIN: CPT | Performed by: SURGERY

## 2019-06-12 RX ORDER — BUPIVACAINE HYDROCHLORIDE 2.5 MG/ML
1 INJECTION, SOLUTION EPIDURAL; INFILTRATION; INTRACAUDAL
Status: COMPLETED | OUTPATIENT
Start: 2019-06-12 | End: 2019-06-12

## 2019-06-12 RX ORDER — DEXAMETHASONE SODIUM PHOSPHATE 100 MG/10ML
40 INJECTION INTRAMUSCULAR; INTRAVENOUS
Status: COMPLETED | OUTPATIENT
Start: 2019-06-12 | End: 2019-06-12

## 2019-06-12 RX ADMIN — DEXAMETHASONE SODIUM PHOSPHATE 40 MG: 100 INJECTION INTRAMUSCULAR; INTRAVENOUS at 09:43

## 2019-06-12 RX ADMIN — BUPIVACAINE HYDROCHLORIDE 1 ML: 2.5 INJECTION, SOLUTION EPIDURAL; INFILTRATION; INTRACAUDAL at 09:43

## 2019-07-11 DIAGNOSIS — J45.20 MILD INTERMITTENT ASTHMA WITHOUT COMPLICATION: ICD-10-CM

## 2019-07-11 DIAGNOSIS — Z30.41 ENCOUNTER FOR SURVEILLANCE OF CONTRACEPTIVE PILLS: ICD-10-CM

## 2019-07-11 DIAGNOSIS — F41.1 GENERALIZED ANXIETY DISORDER: ICD-10-CM

## 2019-07-11 RX ORDER — ALBUTEROL SULFATE 90 UG/1
2 AEROSOL, METERED RESPIRATORY (INHALATION) EVERY 6 HOURS PRN
Qty: 8.5 G | Refills: 0 | Status: SHIPPED | OUTPATIENT
Start: 2019-07-11 | End: 2022-05-15 | Stop reason: SDUPTHER

## 2019-07-11 RX ORDER — ALPRAZOLAM 0.5 MG/1
0.5 TABLET ORAL
Qty: 30 TABLET | Refills: 0 | Status: SHIPPED | OUTPATIENT
Start: 2019-07-11 | End: 2019-11-11 | Stop reason: SDUPTHER

## 2019-07-11 RX ORDER — DROSPIRENONE AND ETHINYL ESTRADIOL 0.02-3(28)
1 KIT ORAL DAILY
Qty: 90 TABLET | Refills: 0 | Status: CANCELLED | OUTPATIENT
Start: 2019-07-11 | End: 2019-10-09

## 2019-07-24 ENCOUNTER — OFFICE VISIT (OUTPATIENT)
Dept: OBGYN CLINIC | Facility: CLINIC | Age: 44
End: 2019-07-24
Payer: COMMERCIAL

## 2019-07-24 VITALS
HEIGHT: 68 IN | DIASTOLIC BLOOD PRESSURE: 78 MMHG | HEART RATE: 88 BPM | BODY MASS INDEX: 30.31 KG/M2 | WEIGHT: 200 LBS | SYSTOLIC BLOOD PRESSURE: 124 MMHG

## 2019-07-24 DIAGNOSIS — S69.81XD INJURY OF TRIANGULAR FIBROCARTILAGE COMPLEX (TFCC) OF RIGHT WRIST, SUBSEQUENT ENCOUNTER: Primary | ICD-10-CM

## 2019-07-24 DIAGNOSIS — M25.531 PAIN IN RIGHT WRIST: ICD-10-CM

## 2019-07-24 PROCEDURE — 99213 OFFICE O/P EST LOW 20 MIN: CPT | Performed by: SURGERY

## 2019-07-24 NOTE — PROGRESS NOTES
ASSESSMENT/PLAN:      52-year-old female with right TFCC/foveal pain after fall several months ago  Since patient has had no relief with steroid injection or therapy will proceed with an MRI to further investigate the source of her pain  Possible diagnoses include TFCC tear or subluxing ECU tendon due to subsheath tear  Will see her back after the MRI to discuss further treatment options  In the meantime continue wearing her brace at night, she may take Tylenol or ibuprofen for pain control  The patient verbalized understanding of exam findings and treatment plan  We engaged in the shared decision-making process and treatment options were discussed at length with the patient  Surgical and conservative management discussed today along with risks and benefits  Diagnoses and all orders for this visit:    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, subsequent encounter    Pain in right wrist      Follow Up:  No follow-ups on file  To Do Next Visit:  Re-evaluation of current issue    ____________________________________________________________________________________________________________________________________________      CHIEF COMPLAINT:  Chief Complaint   Patient presents with    Right Hand - Follow-up    Right Wrist - Follow-up       SUBJECTIVE:  Omkar Diana is a 44y o  year old RHD female who presents for follow-up evaluation for right ulnar-sided wrist pain after a fall around [facility] time  Patient received a steroid injection into the wrist at her last visit and states that helped for a little while but she still notices the same pain with certain motions such as twisting motions of the wrist and squeezing/grabbing  Denies numbness or tingling  I have personally reviewed all the relevant PMH, PSH, SH, FH, Medications and allergies       PAST MEDICAL HISTORY:  Past Medical History:   Diagnosis Date    Arthritis     Asthma     Breast lump     resolved [id number]    Depression     Disease of thyroid gland     Hair loss     Hemorrhage of conjunctiva, left     last assessed [id number]    Lyme disease     lastt assessed [id number]    Thyroid disease        PAST SURGICAL HISTORY:  Past Surgical History:   Procedure Laterality Date    LUMBAR FUSION  01/01/2004    Lumbar Vertebral Fusion;  2004       FAMILY HISTORY:  Family History   Problem Relation Age of Onset    Atrial fibrillation Mother     Hyperlipidemia Mother     Hypertension Mother     Supraventricular tachycardia Mother     Heart disease Mother     Diabetes Father     Hyperlipidemia Father     Hypertension Father     Arthritis Maternal Grandmother     Leukemia Maternal Grandmother     Stroke Maternal Grandfather     Pancreatic cancer Maternal Grandfather 72       SOCIAL HISTORY:  Social History     Tobacco Use    Smoking status: Never Smoker    Smokeless tobacco: Never Used   Substance Use Topics    Alcohol use: Yes     Frequency: Monthly or less     Drinks per session: 1 or 2     Binge frequency: Never    Drug use: No       MEDICATIONS:    Current Outpatient Medications:     albuterol (PROAIR HFA) 90 mcg/act inhaler, Inhale 2 puffs every 6 (six) hours as needed for wheezing, Disp: 8 5 g, Rfl: 0    ALPRAZolam (XANAX) 0 5 mg tablet, Take 1 tablet (0 5 mg total) by mouth daily at bedtime as needed for anxiety, Disp: 30 tablet, Rfl: 0    bacitracin-polymyxin b (POLYSPORIN) ointment, Apply topically 2 (two) times a day, Disp: 30 g, Rfl: 0    buPROPion (WELLBUTRIN SR) 150 mg 12 hr tablet, Take 1 tablet (150 mg total) by mouth 2 (two) times a day, Disp: 180 tablet, Rfl: 3    ibuprofen (MOTRIN) 600 mg tablet, Take 1 tablet (600 mg total) by mouth every 6 (six) hours as needed for moderate pain, Disp: 30 tablet, Rfl: 0    levothyroxine 112 mcg tablet, Take 1 tablet (112 mcg total) by mouth daily for 360 days, Disp: 90 tablet, Rfl: 3    rizatriptan (MAXALT) 10 MG tablet, Take 1 tablet (10 mg total) by mouth once as needed for migraine for up to 1 dose, Disp: 16 tablet, Rfl: 1    Cholecalciferol (VITAMIN D3) 54109 units TABS, Take 1 tablet (50,000 Units total) by mouth once a week for 8 doses, Disp: 8 tablet, Rfl: 0    drospirenone-ethinyl estradiol (BEATRIZ) 3-0 02 MG per tablet, Take 1 tablet by mouth daily for 90 days, Disp: 90 tablet, Rfl: 3    ALLERGIES:  Allergies   Allergen Reactions    Pollen Extract        REVIEW OF SYSTEMS:  Review of Systems   Constitutional: Negative for chills, diaphoresis, fatigue and fever  HENT: Negative for congestion, facial swelling, hearing loss, sore throat, trouble swallowing and voice change  Respiratory: Negative for apnea, cough, shortness of breath, wheezing and stridor  Cardiovascular: Negative for chest pain, palpitations and leg swelling  Musculoskeletal: Positive for arthralgias  Negative for gait problem, joint swelling and myalgias  Skin: Negative for color change, pallor, rash and wound  Neurological: Negative for tremors, speech difficulty, weakness and numbness         VITALS:  Vitals:    07/24/19 0807   BP: 124/78   Pulse: 88       LABS:  HgA1c:   Lab Results   Component Value Date    HGBA1C 5 6 09/02/2018     BMP:   Lab Results   Component Value Date    GLUCOSE 94 03/11/2015    CALCIUM 9 0 09/02/2018     03/11/2015    K 4 1 09/02/2018    CO2 25 09/02/2018     09/02/2018    BUN 6 09/02/2018    CREATININE 0 87 09/02/2018       _____________________________________________________  PHYSICAL EXAMINATION:  General: well developed and well nourished, alert, oriented times 3 and appears comfortable  Psychiatric: Normal  HEENT: Normocephalic, Atraumatic Trachea Midline, No torticollis  Pulmonary: No audible wheezing or respiratory distress   Cardiovascular: No pitting edema, 2+ radial pulse   Skin: No masses, erythema, lacerations, fluctation, ulcerations  Neurovascular: Sensation Intact to the Median, Ulnar, Radial Nerve, Motor Intact to the Median, Ulnar, Radial Nerve and Pulses Intact  Musculoskeletal: Normal, except as noted in detailed exam and in HPI  MUSCULOSKELETAL EXAMINATION:  Right wrist  No current tenderness to palpation, but patient indicates her pain is normally located around the area of the TFCC  Range of motion of the right wrist  is 80 degrees flexion, 80 degrees extension, 90 degrees pronation,90 degrees supination  There is no swelling present  There is no ecchymosis noted  Pulses are present and capillary fill is normal        ___________________________________________________  STUDIES REVIEWED:  No new studies to review      PROCEDURES PERFORMED:  Procedures  No Procedures performed today    _____________________________________________________      PA Cosign: I supervised the physician assistant and discussed the case with them  I personally performed history and physical exam  I agree with the assessment and plan of care as documented by the physician assistant

## 2019-08-02 ENCOUNTER — HOSPITAL ENCOUNTER (OUTPATIENT)
Dept: RADIOLOGY | Age: 44
Discharge: HOME | End: 2019-08-02
Payer: COMMERCIAL

## 2019-08-02 DIAGNOSIS — S69.81XD INJURY OF TRIANGULAR FIBROCARTILAGE COMPLEX (TFCC) OF RIGHT WRIST, SUBSEQUENT ENCOUNTER: ICD-10-CM

## 2019-08-02 DIAGNOSIS — M25.531 PAIN IN RIGHT WRIST: ICD-10-CM

## 2019-08-02 PROCEDURE — 73221 MRI JOINT UPR EXTREM W/O DYE: CPT

## 2019-08-07 ENCOUNTER — OFFICE VISIT (OUTPATIENT)
Dept: OBGYN CLINIC | Facility: CLINIC | Age: 44
End: 2019-08-07
Payer: COMMERCIAL

## 2019-08-07 VITALS
DIASTOLIC BLOOD PRESSURE: 73 MMHG | HEART RATE: 88 BPM | BODY MASS INDEX: 29.55 KG/M2 | WEIGHT: 195 LBS | SYSTOLIC BLOOD PRESSURE: 107 MMHG | HEIGHT: 68 IN

## 2019-08-07 DIAGNOSIS — M77.8 TENDONITIS OF WRIST, RIGHT: Primary | ICD-10-CM

## 2019-08-07 PROCEDURE — 99213 OFFICE O/P EST LOW 20 MIN: CPT | Performed by: SURGERY

## 2019-08-07 NOTE — PROGRESS NOTES
ASSESSMENT/PLAN:      44 y o  female with right ECU tendinitis  Activities as tolerated  Discussed activity mortification, she should avoid abrupt radial deviation and ulnar deviation as that will worsen her symptoms  If she does have a filar up she should start nighttime bracing again as well as NSAID's as needed  I will see her back in the office in 3-4 months for a possible repeat CSI  The patient verbalized understanding of exam findings and treatment plan  We engaged in the shared decision-making process and treatment options were discussed at length with the patient  Surgical and conservative management discussed today along with risks and benefits  Diagnoses and all orders for this visit:    Tendonitis of wrist, right      Follow Up:  Return in about 3 months (around 11/7/2019)  To Do Next Visit:  Re-evaluation of current issue    ____________________________________________________________________________________________________________________________________________      CHIEF COMPLAINT:  Chief Complaint   Patient presents with    Right Wrist - Follow-up       SUBJECTIVE:  Lester Galdamez is a 44y o  year old RHD female who presents to the office today for MRI review  Ellie continues to have right ulnar sided wrist pain after a fall around [facility]  Ellie underwent a CSI with partial relief  Ellie states that she is experiencing intermittent ulnar sided right wrist pain, with certain movents  She is taking OTC medication as needed for pain control  I have personally reviewed all the relevant PMH, PSH, SH, FH, Medications and allergies       PAST MEDICAL HISTORY:  Past Medical History:   Diagnosis Date    Arthritis     Asthma     Breast lump     resolved [id number]    Depression     Disease of thyroid gland     Hair loss     Hemorrhage of conjunctiva, left     last assessed [id number]    Lyme disease     lastt assessed [id number]    Thyroid disease        PAST SURGICAL HISTORY:  Past Surgical History:   Procedure Laterality Date    LUMBAR FUSION  01/01/2004    Lumbar Vertebral Fusion;  2004       FAMILY HISTORY:  Family History   Problem Relation Age of Onset    Atrial fibrillation Mother     Hyperlipidemia Mother     Hypertension Mother     Supraventricular tachycardia Mother     Heart disease Mother     Diabetes Father     Hyperlipidemia Father     Hypertension Father     Arthritis Maternal Grandmother     Leukemia Maternal Grandmother     Stroke Maternal Grandfather     Pancreatic cancer Maternal Grandfather 72       SOCIAL HISTORY:  Social History     Tobacco Use    Smoking status: Never Smoker    Smokeless tobacco: Never Used   Substance Use Topics    Alcohol use: Yes     Frequency: Monthly or less     Drinks per session: 1 or 2     Binge frequency: Never    Drug use: No       MEDICATIONS:    Current Outpatient Medications:     albuterol (PROAIR HFA) 90 mcg/act inhaler, Inhale 2 puffs every 6 (six) hours as needed for wheezing, Disp: 8 5 g, Rfl: 0    ALPRAZolam (XANAX) 0 5 mg tablet, Take 1 tablet (0 5 mg total) by mouth daily at bedtime as needed for anxiety, Disp: 30 tablet, Rfl: 0    bacitracin-polymyxin b (POLYSPORIN) ointment, Apply topically 2 (two) times a day, Disp: 30 g, Rfl: 0    buPROPion (WELLBUTRIN SR) 150 mg 12 hr tablet, Take 1 tablet (150 mg total) by mouth 2 (two) times a day, Disp: 180 tablet, Rfl: 3    ibuprofen (MOTRIN) 600 mg tablet, Take 1 tablet (600 mg total) by mouth every 6 (six) hours as needed for moderate pain, Disp: 30 tablet, Rfl: 0    levothyroxine 112 mcg tablet, Take 1 tablet (112 mcg total) by mouth daily for 360 days, Disp: 90 tablet, Rfl: 3    rizatriptan (MAXALT) 10 MG tablet, Take 1 tablet (10 mg total) by mouth once as needed for migraine for up to 1 dose, Disp: 16 tablet, Rfl: 1    Cholecalciferol (VITAMIN D3) 57242 units TABS, Take 1 tablet (50,000 Units total) by mouth once a week for 8 doses, Disp: 8 tablet, Rfl: 0   drospirenone-ethinyl estradiol (BEATRIZ) 3-0 02 MG per tablet, Take 1 tablet by mouth daily for 90 days, Disp: 90 tablet, Rfl: 3    ALLERGIES:  Allergies   Allergen Reactions    Pollen Extract        REVIEW OF SYSTEMS:  Review of Systems   Constitutional: Negative for chills, fever and unexpected weight change  HENT: Negative for hearing loss, nosebleeds and sore throat  Eyes: Negative for pain, redness and visual disturbance  Respiratory: Negative for cough, shortness of breath and wheezing  Cardiovascular: Negative for chest pain, palpitations and leg swelling  Gastrointestinal: Negative for abdominal pain, nausea and vomiting  Endocrine: Negative for polydipsia and polyuria  Genitourinary: Negative for difficulty urinating and hematuria  Musculoskeletal: Negative for arthralgias, joint swelling and myalgias  Skin: Negative for rash and wound  Neurological: Negative for dizziness, numbness and headaches  Psychiatric/Behavioral: Negative for decreased concentration, dysphoric mood and suicidal ideas  The patient is not nervous/anxious          VITALS:  Vitals:    08/07/19 0835   BP: 107/73   Pulse: 88       LABS:  HgA1c:   Lab Results   Component Value Date    HGBA1C 5 6 09/02/2018     BMP:   Lab Results   Component Value Date    GLUCOSE 94 03/11/2015    CALCIUM 9 0 09/02/2018     03/11/2015    K 4 1 09/02/2018    CO2 25 09/02/2018     09/02/2018    BUN 6 09/02/2018    CREATININE 0 87 09/02/2018       _____________________________________________________  PHYSICAL EXAMINATION:  General: well developed and well nourished, alert, oriented times 3 and appears comfortable  Psychiatric: Normal  HEENT: Normocephalic, Atraumatic Trachea Midline, No torticollis  Pulmonary: No audible wheezing or respiratory distress   Cardiovascular: No pitting edema, 2+ radial pulse   Skin: No masses, erythema, lacerations, fluctation, ulcerations  Neurovascular: Sensation Intact to the Median, Ulnar, Radial Nerve, Motor Intact to the Median, Ulnar, Radial Nerve and Pulses Intact  Musculoskeletal: Normal, except as noted in detailed exam and in HPI        MUSCULOSKELETAL EXAMINATION:    Right wrist:    No erythema  No ecchymosis   No edema  Mild tender to palpation over ECU  Pain with ulnar deviation   Full ROM  Sensation intact   2+ radial pulse   Brisk capillary refill     ___________________________________________________  STUDIES REVIEWED:  I have personally reviewed MRI coronal sagittal and axial images which demonstrates no TFCC perforation no SL pathology does have mild ECU tendinitis without evidence of a subsheath tear          PROCEDURES PERFORMED:  Procedures  No Procedures performed today    _____________________________________________________      [facility]    I,:   Kirsten Renteria am acting as a scribe while in the presence of the attending physician :        I,:   Mariam Goldberg, MD personally performed the services described in this documentation    as scribed in my presence :

## 2019-11-11 DIAGNOSIS — F41.1 GENERALIZED ANXIETY DISORDER: ICD-10-CM

## 2019-11-11 RX ORDER — ALPRAZOLAM 0.5 MG/1
TABLET ORAL
Qty: 30 TABLET | Refills: 0 | Status: SHIPPED | OUTPATIENT
Start: 2019-11-11 | End: 2020-02-03 | Stop reason: SDUPTHER

## 2019-11-12 ENCOUNTER — TELEPHONE (OUTPATIENT)
Dept: FAMILY MEDICINE CLINIC | Facility: CLINIC | Age: 44
End: 2019-11-12

## 2019-11-12 ENCOUNTER — OFFICE VISIT (OUTPATIENT)
Dept: FAMILY MEDICINE CLINIC | Facility: CLINIC | Age: 44
End: 2019-11-12
Payer: COMMERCIAL

## 2019-11-12 DIAGNOSIS — F51.04 PSYCHOPHYSIOLOGICAL INSOMNIA: ICD-10-CM

## 2019-11-12 DIAGNOSIS — G43.909 MIGRAINE WITHOUT STATUS MIGRAINOSUS, NOT INTRACTABLE, UNSPECIFIED MIGRAINE TYPE: Primary | ICD-10-CM

## 2019-11-12 PROCEDURE — 99213 OFFICE O/P EST LOW 20 MIN: CPT | Performed by: NURSE PRACTITIONER

## 2019-11-12 PROCEDURE — 1036F TOBACCO NON-USER: CPT | Performed by: NURSE PRACTITIONER

## 2019-11-12 RX ORDER — TRAZODONE HYDROCHLORIDE 50 MG/1
50 TABLET ORAL
Qty: 30 TABLET | Refills: 5 | Status: SHIPPED | OUTPATIENT
Start: 2019-11-12 | End: 2020-04-06 | Stop reason: ALTCHOICE

## 2019-11-12 RX ORDER — ZOLMITRIPTAN 5 MG/1
5 TABLET, FILM COATED ORAL ONCE AS NEEDED
Qty: 6 TABLET | Refills: 5 | Status: SHIPPED | OUTPATIENT
Start: 2019-11-12 | End: 2020-02-03 | Stop reason: SDUPTHER

## 2019-11-12 RX ORDER — ELETRIPTAN HYDROBROMIDE 40 MG/1
40 TABLET, FILM COATED ORAL ONCE AS NEEDED
Qty: 6 TABLET | Refills: 0 | Status: SHIPPED | OUTPATIENT
Start: 2019-11-12 | End: 2019-11-12

## 2019-11-12 NOTE — TELEPHONE ENCOUNTER
Patient called and stated that she has a really bad migraine and wanted to know if she can be squeezed in today   Please advise

## 2019-11-12 NOTE — TELEPHONE ENCOUNTER
I don't have anything left for today  Options are tomorrow or to come in for a todadol injection to see if that helps with migraine

## 2019-11-12 NOTE — PROGRESS NOTES
Assessment/Plan:           Problem List Items Addressed This Visit        Cardiovascular and Mediastinum    Migraine - Primary    Relevant Medications    traZODone (DESYREL) 50 mg tablet    ZOLMitriptan (ZOMIG) 5 MG tablet       Other    Psychophysiological insomnia    Relevant Medications    traZODone (DESYREL) 50 mg tablet            Subjective:      Patient ID: Katie Reynolds is a 44 y o  female  Here for c/o ha, migraine that is not resolving  Unable to work today  Photo and phonophobia  Pressure and pain  Took meds with no relief, nsaid and triptan  Mild nausea  Having a lot of stress at home  Trouble sleeping due to stress  No change in ha  Not worst ha of life  Typical features        The following portions of the patient's history were reviewed and updated as appropriate: allergies, current medications, past family history, past medical history, past social history, past surgical history and problem list     Review of Systems   Constitutional: Positive for fatigue  Negative for fever  Respiratory: Negative for cough and shortness of breath  Cardiovascular: Negative for chest pain and palpitations  Gastrointestinal: Positive for nausea  Negative for constipation, diarrhea and vomiting  Musculoskeletal: Negative for arthralgias and myalgias  Skin: Negative for rash  Neurological: Positive for headaches  Negative for dizziness, seizures, weakness and light-headedness  Hematological: Negative for adenopathy  Psychiatric/Behavioral: Positive for dysphoric mood and sleep disturbance  Negative for suicidal ideas  The patient is nervous/anxious            Objective:    /82   Pulse 86   Resp 16   Ht 5' 8" (1 727 m)   Wt 87 5 kg (193 lb)   SpO2 98%   BMI 29 35 kg/m²     Pulse 86   Resp 16   Ht 5' 8" (1 727 m)   Wt 87 5 kg (193 lb)   SpO2 98%   BMI 29 35 kg/m²   Family History   Problem Relation Age of Onset    Atrial fibrillation Mother     Hyperlipidemia Mother     Hypertension Mother     Supraventricular tachycardia Mother     Heart disease Mother     Diabetes Father     Hyperlipidemia Father     Hypertension Father     Arthritis Maternal Grandmother     Leukemia Maternal Grandmother     Stroke Maternal Grandfather     Pancreatic cancer Maternal Grandfather 72     Past Medical History:   Diagnosis Date    Arthritis     Asthma     Breast lump     resolved [id number]    Depression     Disease of thyroid gland     Hair loss     Hemorrhage of conjunctiva, left     last assessed [id number]    Lyme disease     lastt assessed [id number]    Thyroid disease      Social History     Socioeconomic History    Marital status: Legally      Spouse name: Not on file    Number of children: Not on file    Years of education: Not on file    Highest education level: Not on file   Occupational History    Not on file   Social Needs    Financial resource strain: Not on file    Food insecurity:     Worry: Not on file     Inability: Not on file    Transportation needs:     Medical: Not on file     Non-medical: Not on file   Tobacco Use    Smoking status: Never Smoker    Smokeless tobacco: Never Used   Substance and Sexual Activity    Alcohol use: Yes     Frequency: Monthly or less     Drinks per session: 1 or 2     Binge frequency: Never    Drug use: No    Sexual activity: Yes     Partners: Male     Birth control/protection: Pill   Lifestyle    Physical activity:     Days per week: Not on file     Minutes per session: Not on file    Stress: Not on file   Relationships    Social connections:     Talks on phone: Not on file     Gets together: Not on file     Attends Cheondoism service: Not on file     Active member of club or organization: Not on file     Attends meetings of clubs or organizations: Not on file     Relationship status: Not on file    Intimate partner violence:     Fear of current or ex partner: Not on file     Emotionally abused: Not on file     Physically abused: Not on file     Forced sexual activity: Not on file   Other Topics Concern    Not on file   Social History Narrative    Caffeine use       Current Outpatient Medications:     albuterol (PROAIR HFA) 90 mcg/act inhaler, Inhale 2 puffs every 6 (six) hours as needed for wheezing, Disp: 8 5 g, Rfl: 0    ALPRAZolam (XANAX) 0 5 mg tablet, TAKE ONE TABLET BY MOUTH DAILY AT BEDTIME AS NEEDED FOR ANXIETY, Disp: 30 tablet, Rfl: 0    bacitracin-polymyxin b (POLYSPORIN) ointment, Apply topically 2 (two) times a day, Disp: 30 g, Rfl: 0    buPROPion (WELLBUTRIN SR) 150 mg 12 hr tablet, Take 1 tablet (150 mg total) by mouth 2 (two) times a day, Disp: 180 tablet, Rfl: 3    Cholecalciferol (VITAMIN D3) 40909 units TABS, Take 1 tablet (50,000 Units total) by mouth once a week for 8 doses, Disp: 8 tablet, Rfl: 0    drospirenone-ethinyl estradiol (BEATRIZ) 3-0 02 MG per tablet, Take 1 tablet by mouth daily for 90 days, Disp: 90 tablet, Rfl: 3    ibuprofen (MOTRIN) 600 mg tablet, Take 1 tablet (600 mg total) by mouth every 6 (six) hours as needed for moderate pain, Disp: 30 tablet, Rfl: 0    levothyroxine 112 mcg tablet, Take 1 tablet (112 mcg total) by mouth daily for 360 days, Disp: 90 tablet, Rfl: 3    rizatriptan (MAXALT) 10 MG tablet, Take 1 tablet (10 mg total) by mouth once as needed for migraine for up to 1 dose, Disp: 16 tablet, Rfl: 1    traZODone (DESYREL) 50 mg tablet, Take 1 tablet (50 mg total) by mouth daily at bedtime, Disp: 30 tablet, Rfl: 5    ZOLMitriptan (ZOMIG) 5 MG tablet, Take 1 tablet (5 mg total) by mouth once as needed for migraine for up to 1 dose, Disp: 6 tablet, Rfl: 5  Allergies   Allergen Reactions    Pollen Extract           Physical Exam   Constitutional: She is oriented to person, place, and time  She appears well-developed and well-nourished  HENT:   Head: Normocephalic and atraumatic     Right Ear: External ear normal    Left Ear: External ear normal    Nose: Nose normal  Mouth/Throat: Oropharynx is clear and moist    Eyes: Conjunctivae are normal    Neck: Normal range of motion  Neck supple  Cardiovascular: Normal rate, regular rhythm and normal heart sounds  Pulmonary/Chest: Effort normal and breath sounds normal    Abdominal: Soft  Bowel sounds are normal    Musculoskeletal: Normal range of motion  Neurological: She is alert and oriented to person, place, and time  Skin: Skin is warm and dry  Capillary refill takes less than 2 seconds  Psychiatric: She has a normal mood and affect  Her behavior is normal  Judgment and thought content normal    Nursing note and vitals reviewed  BMI Counseling: Body mass index is 29 35 kg/m²  The BMI is above normal  Nutrition recommendations include reducing portion sizes, decreasing overall calorie intake, 3-5 servings of fruits/vegetables daily, reducing fast food intake, consuming healthier snacks, decreasing soda and/or juice intake, moderation in carbohydrate intake, increasing intake of lean protein, reducing intake of saturated fat and trans fat and reducing intake of cholesterol  Exercise recommendations include moderate aerobic physical activity for 150 minutes/week, exercising 3-5 times per week and strength training exercises

## 2019-11-12 NOTE — TELEPHONE ENCOUNTER
Sandra Lisa at [address] called and insurance covers Imitrex, Maxalt or Zomig  Please resend script for one of the above

## 2019-11-18 VITALS
BODY MASS INDEX: 29.25 KG/M2 | WEIGHT: 193 LBS | RESPIRATION RATE: 16 BRPM | HEART RATE: 86 BPM | OXYGEN SATURATION: 98 % | DIASTOLIC BLOOD PRESSURE: 82 MMHG | SYSTOLIC BLOOD PRESSURE: 124 MMHG | HEIGHT: 68 IN

## 2019-11-18 PROBLEM — F51.04 PSYCHOPHYSIOLOGICAL INSOMNIA: Status: ACTIVE | Noted: 2019-11-18

## 2020-01-08 ENCOUNTER — OFFICE VISIT (OUTPATIENT)
Dept: FAMILY MEDICINE CLINIC | Facility: CLINIC | Age: 45
End: 2020-01-08
Payer: COMMERCIAL

## 2020-01-08 VITALS
HEIGHT: 68 IN | BODY MASS INDEX: 30.31 KG/M2 | OXYGEN SATURATION: 98 % | DIASTOLIC BLOOD PRESSURE: 78 MMHG | RESPIRATION RATE: 16 BRPM | WEIGHT: 200 LBS | HEART RATE: 98 BPM | SYSTOLIC BLOOD PRESSURE: 120 MMHG

## 2020-01-08 DIAGNOSIS — E66.09 OBESITY DUE TO EXCESS CALORIES WITHOUT SERIOUS COMORBIDITY, UNSPECIFIED CLASSIFICATION: ICD-10-CM

## 2020-01-08 DIAGNOSIS — M79.7 FIBROMYALGIA: ICD-10-CM

## 2020-01-08 DIAGNOSIS — E55.9 VITAMIN D DEFICIENCY: ICD-10-CM

## 2020-01-08 DIAGNOSIS — G43.909 MIGRAINE WITHOUT STATUS MIGRAINOSUS, NOT INTRACTABLE, UNSPECIFIED MIGRAINE TYPE: ICD-10-CM

## 2020-01-08 DIAGNOSIS — E03.9 HYPOTHYROIDISM, UNSPECIFIED TYPE: Primary | ICD-10-CM

## 2020-01-08 PROCEDURE — 99214 OFFICE O/P EST MOD 30 MIN: CPT | Performed by: NURSE PRACTITIONER

## 2020-01-08 NOTE — PROGRESS NOTES
Assessment/Plan:           Problem List Items Addressed This Visit        Endocrine    Hypothyroidism - Primary    Relevant Orders    Comprehensive metabolic panel (Completed)    TSH, 3rd generation with Free T4 reflex (Completed)    Iron Panel (Includes Ferritin, Iron Sat%, Iron, and TIBC) (Completed)    CBC and differential (Completed)    Lipid Panel with Direct LDL reflex (Completed)    Vitamin D 25 hydroxy (Completed)       Cardiovascular and Mediastinum    Migraine    Relevant Orders    Comprehensive metabolic panel (Completed)    TSH, 3rd generation with Free T4 reflex (Completed)    Iron Panel (Includes Ferritin, Iron Sat%, Iron, and TIBC) (Completed)    CBC and differential (Completed)    Lipid Panel with Direct LDL reflex (Completed)    Vitamin D 25 hydroxy (Completed)       Other    Fibromyalgia    Relevant Orders    Comprehensive metabolic panel (Completed)    TSH, 3rd generation with Free T4 reflex (Completed)    Iron Panel (Includes Ferritin, Iron Sat%, Iron, and TIBC) (Completed)    CBC and differential (Completed)    Lipid Panel with Direct LDL reflex (Completed)    Vitamin D 25 hydroxy (Completed)    Obesity    Relevant Orders    Comprehensive metabolic panel (Completed)    TSH, 3rd generation with Free T4 reflex (Completed)    Iron Panel (Includes Ferritin, Iron Sat%, Iron, and TIBC) (Completed)    CBC and differential (Completed)    Lipid Panel with Direct LDL reflex (Completed)    Vitamin D 25 hydroxy (Completed)    Vitamin D deficiency    Relevant Orders    Comprehensive metabolic panel (Completed)    TSH, 3rd generation with Free T4 reflex (Completed)    Iron Panel (Includes Ferritin, Iron Sat%, Iron, and TIBC) (Completed)    CBC and differential (Completed)    Lipid Panel with Direct LDL reflex (Completed)    Vitamin D 25 hydroxy (Completed)            Subjective:      Patient ID: Nikolay Cosme is a 38 y o  female      Here for f/u to chronic medical conditions  Her friend passed away  Has her own apartment now  Divorce is almost final  Overeating, not exercising  Feeling sad and depressed  No si or hi        The following portions of the patient's history were reviewed and updated as appropriate: allergies, current medications, past family history, past medical history, past social history, past surgical history and problem list     Review of Systems   Constitutional: Positive for fatigue  Negative for fever  HENT: Negative for congestion and postnasal drip  Eyes: Negative for photophobia  Respiratory: Negative for cough and shortness of breath  Cardiovascular: Negative for chest pain and palpitations  Gastrointestinal: Negative for constipation and diarrhea  Genitourinary: Negative for dysuria and frequency  Musculoskeletal: Negative for arthralgias, back pain and myalgias  Skin: Negative for rash  Neurological: Negative for dizziness, light-headedness and headaches  Hematological: Negative for adenopathy  Psychiatric/Behavioral: Positive for dysphoric mood and sleep disturbance  Negative for agitation, behavioral problems, confusion, decreased concentration, hallucinations, self-injury and suicidal ideas  The patient is nervous/anxious  The patient is not hyperactive  Objective:               Physical Exam   Constitutional: She is oriented to person, place, and time  She appears well-developed and well-nourished  HENT:   Head: Normocephalic  Right Ear: External ear normal    Left Ear: External ear normal    Nose: Nose normal    Mouth/Throat: Oropharynx is clear and moist    Eyes: Conjunctivae are normal    Neck: Normal range of motion  Neck supple  No thyromegaly present  Cardiovascular: Normal rate, regular rhythm and normal heart sounds  Pulmonary/Chest: Effort normal and breath sounds normal    Abdominal: Soft  Bowel sounds are normal    Musculoskeletal: Normal range of motion  Neurological: She is alert and oriented to person, place, and time     Skin: Skin is warm and dry  Capillary refill takes less than 2 seconds  Psychiatric: She has a normal mood and affect  Her behavior is normal  Judgment and thought content normal    Nursing note and vitals reviewed  BMI Counseling: Body mass index is 30 41 kg/m²  The BMI is above normal  Nutrition recommendations include reducing portion sizes, decreasing overall calorie intake, 3-5 servings of fruits/vegetables daily, reducing fast food intake, consuming healthier snacks, decreasing soda and/or juice intake, moderation in carbohydrate intake, increasing intake of lean protein, reducing intake of saturated fat and trans fat and reducing intake of cholesterol  Exercise recommendations include moderate aerobic physical activity for 150 minutes/week, exercising 3-5 times per week and strength training exercises  Depression Screening Follow-up Plan: Patient's depression screening was positive with a PHQ-2 score of 2  Their PHQ-9 score was 7  Patient assessed for underlying major depression  They have no active suicidal ideations  Brief counseling provided and recommend additional follow-up/re-evaluation next office visit  Continue regular follow-up with their psychologist/therapist/psychiatrist who is managing their mental health condition(s)

## 2020-01-09 ENCOUNTER — APPOINTMENT (OUTPATIENT)
Dept: LAB | Facility: AMBULARY SURGERY CENTER | Age: 45
End: 2020-01-09
Payer: COMMERCIAL

## 2020-01-09 DIAGNOSIS — E53.8 VITAMIN B12 DEFICIENCY: ICD-10-CM

## 2020-01-09 DIAGNOSIS — M79.7 FIBROMYALGIA: ICD-10-CM

## 2020-01-09 DIAGNOSIS — G43.909 MIGRAINE WITHOUT STATUS MIGRAINOSUS, NOT INTRACTABLE, UNSPECIFIED MIGRAINE TYPE: ICD-10-CM

## 2020-01-09 DIAGNOSIS — E53.8 VITAMIN B12 DEFICIENCY: Primary | ICD-10-CM

## 2020-01-09 DIAGNOSIS — E55.9 VITAMIN D DEFICIENCY: ICD-10-CM

## 2020-01-09 DIAGNOSIS — E66.09 OBESITY DUE TO EXCESS CALORIES WITHOUT SERIOUS COMORBIDITY, UNSPECIFIED CLASSIFICATION: ICD-10-CM

## 2020-01-09 DIAGNOSIS — E03.9 HYPOTHYROIDISM, UNSPECIFIED TYPE: ICD-10-CM

## 2020-01-09 LAB
25(OH)D3 SERPL-MCNC: 14.6 NG/ML (ref 30–100)
ALBUMIN SERPL BCP-MCNC: 3.1 G/DL (ref 3.5–5)
ALP SERPL-CCNC: 94 U/L (ref 46–116)
ALT SERPL W P-5'-P-CCNC: 17 U/L (ref 12–78)
ANION GAP SERPL CALCULATED.3IONS-SCNC: 5 MMOL/L (ref 4–13)
AST SERPL W P-5'-P-CCNC: 11 U/L (ref 5–45)
BASOPHILS # BLD AUTO: 0.08 THOUSANDS/ΜL (ref 0–0.1)
BASOPHILS NFR BLD AUTO: 1 % (ref 0–1)
BILIRUB SERPL-MCNC: 0.5 MG/DL (ref 0.2–1)
BUN SERPL-MCNC: 10 MG/DL (ref 5–25)
CALCIUM SERPL-MCNC: 9.2 MG/DL (ref 8.3–10.1)
CHLORIDE SERPL-SCNC: 108 MMOL/L (ref 100–108)
CHOLEST SERPL-MCNC: 221 MG/DL (ref 50–200)
CO2 SERPL-SCNC: 27 MMOL/L (ref 21–32)
CREAT SERPL-MCNC: 0.85 MG/DL (ref 0.6–1.3)
EOSINOPHIL # BLD AUTO: 0.21 THOUSAND/ΜL (ref 0–0.61)
EOSINOPHIL NFR BLD AUTO: 3 % (ref 0–6)
ERYTHROCYTE [DISTWIDTH] IN BLOOD BY AUTOMATED COUNT: 13.2 % (ref 11.6–15.1)
FERRITIN SERPL-MCNC: 35 NG/ML (ref 8–388)
GFR SERPL CREATININE-BSD FRML MDRD: 84 ML/MIN/1.73SQ M
GLUCOSE P FAST SERPL-MCNC: 94 MG/DL (ref 65–99)
HCT VFR BLD AUTO: 41.7 % (ref 34.8–46.1)
HDLC SERPL-MCNC: 57 MG/DL
HGB BLD-MCNC: 13.4 G/DL (ref 11.5–15.4)
IMM GRANULOCYTES # BLD AUTO: 0.03 THOUSAND/UL (ref 0–0.2)
IMM GRANULOCYTES NFR BLD AUTO: 0 % (ref 0–2)
IRON SATN MFR SERPL: 16 %
IRON SERPL-MCNC: 85 UG/DL (ref 50–170)
LDLC SERPL DIRECT ASSAY-MCNC: 102 MG/DL (ref 0–100)
LYMPHOCYTES # BLD AUTO: 2.99 THOUSANDS/ΜL (ref 0.6–4.47)
LYMPHOCYTES NFR BLD AUTO: 42 % (ref 14–44)
MCH RBC QN AUTO: 29.5 PG (ref 26.8–34.3)
MCHC RBC AUTO-ENTMCNC: 32.1 G/DL (ref 31.4–37.4)
MCV RBC AUTO: 92 FL (ref 82–98)
MONOCYTES # BLD AUTO: 0.32 THOUSAND/ΜL (ref 0.17–1.22)
MONOCYTES NFR BLD AUTO: 5 % (ref 4–12)
NEUTROPHILS # BLD AUTO: 3.42 THOUSANDS/ΜL (ref 1.85–7.62)
NEUTS SEG NFR BLD AUTO: 49 % (ref 43–75)
NRBC BLD AUTO-RTO: 0 /100 WBCS
PLATELET # BLD AUTO: 362 THOUSANDS/UL (ref 149–390)
PMV BLD AUTO: 9.6 FL (ref 8.9–12.7)
POTASSIUM SERPL-SCNC: 4.5 MMOL/L (ref 3.5–5.3)
PROT SERPL-MCNC: 7.6 G/DL (ref 6.4–8.2)
RBC # BLD AUTO: 4.54 MILLION/UL (ref 3.81–5.12)
SODIUM SERPL-SCNC: 140 MMOL/L (ref 136–145)
TIBC SERPL-MCNC: 538 UG/DL (ref 250–450)
TRIGL SERPL-MCNC: 469 MG/DL
TSH SERPL DL<=0.05 MIU/L-ACNC: 3.49 UIU/ML (ref 0.36–3.74)
VIT B12 SERPL-MCNC: 184 PG/ML (ref 100–900)
WBC # BLD AUTO: 7.05 THOUSAND/UL (ref 4.31–10.16)

## 2020-01-09 PROCEDURE — 83550 IRON BINDING TEST: CPT

## 2020-01-09 PROCEDURE — 82728 ASSAY OF FERRITIN: CPT

## 2020-01-09 PROCEDURE — 80053 COMPREHEN METABOLIC PANEL: CPT

## 2020-01-09 PROCEDURE — 84443 ASSAY THYROID STIM HORMONE: CPT

## 2020-01-09 PROCEDURE — 82607 VITAMIN B-12: CPT

## 2020-01-09 PROCEDURE — 36415 COLL VENOUS BLD VENIPUNCTURE: CPT

## 2020-01-09 PROCEDURE — 83721 ASSAY OF BLOOD LIPOPROTEIN: CPT

## 2020-01-09 PROCEDURE — 85025 COMPLETE CBC W/AUTO DIFF WBC: CPT

## 2020-01-09 PROCEDURE — 82306 VITAMIN D 25 HYDROXY: CPT

## 2020-01-09 PROCEDURE — 80061 LIPID PANEL: CPT

## 2020-01-09 PROCEDURE — 83540 ASSAY OF IRON: CPT

## 2020-01-13 PROBLEM — L98.8 AGE-RELATED FACIAL WRINKLES: Status: RESOLVED | Noted: 2017-06-21 | Resolved: 2020-01-13

## 2020-01-13 PROBLEM — Z01.419 VISIT FOR GYNECOLOGIC EXAMINATION: Status: RESOLVED | Noted: 2018-03-01 | Resolved: 2020-01-13

## 2020-01-13 PROBLEM — J06.9 VIRAL UPPER RESPIRATORY TRACT INFECTION: Status: RESOLVED | Noted: 2019-01-28 | Resolved: 2020-01-13

## 2020-01-13 PROBLEM — R92.8 ABNORMAL MAMMOGRAM OF RIGHT BREAST: Status: RESOLVED | Noted: 2017-02-03 | Resolved: 2020-01-13

## 2020-01-13 PROBLEM — Z01.419 NORMAL PELVIC EXAM: Status: RESOLVED | Noted: 2019-03-07 | Resolved: 2020-01-13

## 2020-01-13 PROBLEM — F51.04 PSYCHOPHYSIOLOGICAL INSOMNIA: Status: RESOLVED | Noted: 2019-11-18 | Resolved: 2020-01-13

## 2020-01-13 PROBLEM — N84.1 CERVICAL POLYP: Status: RESOLVED | Noted: 2018-03-01 | Resolved: 2020-01-13

## 2020-02-01 DIAGNOSIS — E03.9 HYPOTHYROIDISM, UNSPECIFIED TYPE: ICD-10-CM

## 2020-02-01 DIAGNOSIS — G43.909 MIGRAINE WITHOUT STATUS MIGRAINOSUS, NOT INTRACTABLE, UNSPECIFIED MIGRAINE TYPE: ICD-10-CM

## 2020-02-01 DIAGNOSIS — F41.1 GENERALIZED ANXIETY DISORDER: ICD-10-CM

## 2020-02-01 DIAGNOSIS — Z30.41 ENCOUNTER FOR SURVEILLANCE OF CONTRACEPTIVE PILLS: ICD-10-CM

## 2020-02-01 DIAGNOSIS — F41.8 DEPRESSION WITH ANXIETY: ICD-10-CM

## 2020-02-01 RX ORDER — ALPRAZOLAM 0.5 MG/1
TABLET ORAL
Qty: 30 TABLET | Refills: 0 | Status: CANCELLED | OUTPATIENT
Start: 2020-02-01

## 2020-02-01 RX ORDER — LEVOTHYROXINE SODIUM 112 UG/1
112 TABLET ORAL DAILY
Qty: 90 TABLET | Refills: 0 | Status: CANCELLED | OUTPATIENT
Start: 2020-02-01 | End: 2021-01-26

## 2020-02-01 RX ORDER — BUPROPION HYDROCHLORIDE 150 MG/1
150 TABLET, EXTENDED RELEASE ORAL 2 TIMES DAILY
Qty: 180 TABLET | Refills: 0 | Status: CANCELLED | OUTPATIENT
Start: 2020-02-01

## 2020-02-01 RX ORDER — ZOLMITRIPTAN 5 MG/1
5 TABLET, FILM COATED ORAL ONCE AS NEEDED
Qty: 6 TABLET | Refills: 0 | Status: CANCELLED | OUTPATIENT
Start: 2020-02-01

## 2020-02-03 DIAGNOSIS — F41.1 GENERALIZED ANXIETY DISORDER: ICD-10-CM

## 2020-02-03 DIAGNOSIS — F41.8 DEPRESSION WITH ANXIETY: ICD-10-CM

## 2020-02-03 DIAGNOSIS — G43.909 MIGRAINE WITHOUT STATUS MIGRAINOSUS, NOT INTRACTABLE, UNSPECIFIED MIGRAINE TYPE: ICD-10-CM

## 2020-02-03 DIAGNOSIS — E03.9 HYPOTHYROIDISM, UNSPECIFIED TYPE: ICD-10-CM

## 2020-02-03 RX ORDER — BUPROPION HYDROCHLORIDE 150 MG/1
150 TABLET, EXTENDED RELEASE ORAL 2 TIMES DAILY
Qty: 180 TABLET | Refills: 3 | Status: SHIPPED | OUTPATIENT
Start: 2020-02-03 | End: 2021-07-01

## 2020-02-03 RX ORDER — ZOLMITRIPTAN 5 MG/1
5 TABLET, FILM COATED ORAL ONCE AS NEEDED
Qty: 6 TABLET | Refills: 5 | Status: SHIPPED | OUTPATIENT
Start: 2020-02-03 | End: 2021-07-20 | Stop reason: SDUPTHER

## 2020-02-03 RX ORDER — ALPRAZOLAM 0.5 MG/1
0.5 TABLET ORAL DAILY PRN
Qty: 30 TABLET | Refills: 0 | Status: SHIPPED | OUTPATIENT
Start: 2020-02-03 | End: 2020-02-03 | Stop reason: SDUPTHER

## 2020-02-03 RX ORDER — LEVOTHYROXINE SODIUM 112 UG/1
112 TABLET ORAL DAILY
Qty: 90 TABLET | Refills: 3 | Status: SHIPPED | OUTPATIENT
Start: 2020-02-03 | End: 2020-04-06 | Stop reason: SDUPTHER

## 2020-02-03 RX ORDER — ALPRAZOLAM 0.5 MG/1
0.5 TABLET ORAL DAILY PRN
Qty: 30 TABLET | Refills: 0 | Status: SHIPPED | OUTPATIENT
Start: 2020-02-03 | End: 2020-04-28

## 2020-02-04 RX ORDER — DROSPIRENONE AND ETHINYL ESTRADIOL 0.02-3(28)
1 KIT ORAL DAILY
Qty: 90 TABLET | Refills: 0 | Status: SHIPPED | OUTPATIENT
Start: 2020-02-04 | End: 2020-03-11 | Stop reason: SDUPTHER

## 2020-03-09 ENCOUNTER — HOSPITAL ENCOUNTER (OUTPATIENT)
Dept: RADIOLOGY | Age: 45
Discharge: HOME | End: 2020-03-09
Payer: COMMERCIAL

## 2020-03-09 VITALS — WEIGHT: 200 LBS | BODY MASS INDEX: 30.31 KG/M2 | HEIGHT: 68 IN

## 2020-03-09 DIAGNOSIS — Z12.31 ENCOUNTER FOR SCREENING MAMMOGRAM FOR MALIGNANT NEOPLASM OF BREAST: ICD-10-CM

## 2020-03-09 PROCEDURE — 77067 SCR MAMMO BI INCL CAD: CPT

## 2020-03-09 PROCEDURE — 77063 BREAST TOMOSYNTHESIS BI: CPT

## 2020-03-11 ENCOUNTER — ANNUAL EXAM (OUTPATIENT)
Dept: OBGYN CLINIC | Facility: CLINIC | Age: 45
End: 2020-03-11
Payer: COMMERCIAL

## 2020-03-11 VITALS
DIASTOLIC BLOOD PRESSURE: 84 MMHG | SYSTOLIC BLOOD PRESSURE: 122 MMHG | WEIGHT: 202 LBS | HEIGHT: 68 IN | BODY MASS INDEX: 30.62 KG/M2

## 2020-03-11 DIAGNOSIS — Z01.419 GYNECOLOGIC EXAM NORMAL: Primary | ICD-10-CM

## 2020-03-11 DIAGNOSIS — Z30.41 ENCOUNTER FOR SURVEILLANCE OF CONTRACEPTIVE PILLS: ICD-10-CM

## 2020-03-11 PROCEDURE — G0145 SCR C/V CYTO,THINLAYER,RESCR: HCPCS | Performed by: PHYSICIAN ASSISTANT

## 2020-03-11 PROCEDURE — S0612 ANNUAL GYNECOLOGICAL EXAMINA: HCPCS | Performed by: PHYSICIAN ASSISTANT

## 2020-03-11 PROCEDURE — 87624 HPV HI-RISK TYP POOLED RSLT: CPT | Performed by: PHYSICIAN ASSISTANT

## 2020-03-11 PROCEDURE — 3008F BODY MASS INDEX DOCD: CPT | Performed by: PHYSICIAN ASSISTANT

## 2020-03-11 RX ORDER — DROSPIRENONE AND ETHINYL ESTRADIOL 0.02-3(28)
1 KIT ORAL DAILY
Qty: 90 TABLET | Refills: 3 | Status: SHIPPED | OUTPATIENT
Start: 2020-03-11 | End: 2021-03-12 | Stop reason: SDUPTHER

## 2020-03-11 NOTE — ASSESSMENT & PLAN NOTE
Pap guidelines reviewed  Pap with HPV done today  Will plan to continue [facility] OCP  Return to office for annual or as needed

## 2020-03-11 NOTE — PROGRESS NOTES
Assessment/Plan   Problem List Items Addressed This Visit        Other    Gynecologic exam normal - Primary     Pap guidelines reviewed  Pap with HPV done today  Will plan to continue [facility] OCP  Return to office for annual or as needed  Relevant Orders    Liquid-based pap, screening      Other Visit Diagnoses     Encounter for surveillance of contraceptive pills        Relevant Medications    drospirenone-ethinyl estradiol (BEATRIZ) 3-0 02 MG per tablet          Subjective:     Patient ID: Anupama Lock is a 38 y o  y o  female  HPI  37 yo seen for annual exam  Currently on [facility] OCP, tolerating well would like to continue  Denies bowel or bladder issues  Last pap: 3/7/2019 NILM, 3/1/2018 NILM (-)HRHPV  2017 Atypical glandular cells  Biopsy normal    Last mammogram: 3/9/2020 BIRADS 1: Negative  The following portions of the patient's history were reviewed and updated as appropriate:   She  has a past medical history of Arthritis, Asthma, Breast lump, Depression, Disease of thyroid gland, Hair loss, Hemorrhage of conjunctiva, left, Lyme disease, and Thyroid disease  She   Patient Active Problem List    Diagnosis Date Noted    Gynecologic exam normal 03/11/2020    Mild intermittent asthma without complication 66/09/4053    Iron deficiency 04/28/2017    Atypical glandular cell changes cervix of undetermined significance favor dysplasia 03/20/2017    Deviated nasal septum 01/09/2017    Obesity 04/14/2016    Primary osteoarthritis of left knee 07/29/2015    Herpes labialis 12/18/2014    Vitamin B12 deficiency 07/28/2014    Vitamin D deficiency 07/28/2014    Depression with anxiety 02/05/2013    Allergic rhinitis 07/09/2012    Fibromyalgia 07/09/2012    Hypothyroidism 07/09/2012    Migraine 07/09/2012     She  has a past surgical history that includes Lumbar fusion (01/01/2004)    Her family history includes Arthritis in her maternal grandmother; Atrial fibrillation in her mother; Cancer in her maternal grandmother; Diabetes in her father; Heart disease in her mother; Hyperlipidemia in her father and mother; Hypertension in her father and mother; Leukemia in her maternal grandmother; No Known Problems in her paternal aunt, paternal aunt, paternal aunt, paternal grandfather, paternal grandmother, sister, and sister; Pancreatic cancer (age of onset: 72) in her maternal grandfather; Stroke in her maternal grandfather; Supraventricular tachycardia in her mother  She  reports that she has never smoked  She has never used smokeless tobacco  She reports that she drinks alcohol  She reports that she does not use drugs    Current Outpatient Medications   Medication Sig Dispense Refill    albuterol (PROAIR HFA) 90 mcg/act inhaler Inhale 2 puffs every 6 (six) hours as needed for wheezing 8 5 g 0    ALPRAZolam (XANAX) 0 5 mg tablet Take 1 tablet (0 5 mg total) by mouth daily as needed for anxiety 30 tablet 0    bacitracin-polymyxin b (POLYSPORIN) ointment Apply topically 2 (two) times a day 30 g 0    buPROPion (WELLBUTRIN SR) 150 mg 12 hr tablet Take 1 tablet (150 mg total) by mouth 2 (two) times a day 180 tablet 3    Cholecalciferol (VITAMIN D3) 15776 units TABS Take 1 tablet (50,000 Units total) by mouth once a week for 8 doses 8 tablet 0    drospirenone-ethinyl estradiol (BEATRIZ) 3-0 02 MG per tablet Take 1 tablet by mouth daily 90 tablet 3    ibuprofen (MOTRIN) 600 mg tablet Take 1 tablet (600 mg total) by mouth every 6 (six) hours as needed for moderate pain 30 tablet 0    levothyroxine 112 mcg tablet Take 1 tablet (112 mcg total) by mouth daily 90 tablet 3    rizatriptan (MAXALT) 10 MG tablet Take 1 tablet (10 mg total) by mouth once as needed for migraine for up to 1 dose 16 tablet 1    traZODone (DESYREL) 50 mg tablet Take 1 tablet (50 mg total) by mouth daily at bedtime 30 tablet 5    ZOLMitriptan (ZOMIG) 5 MG tablet Take 1 tablet (5 mg total) by mouth once as needed for migraine for up to 1 dose 6 tablet 5     No current facility-administered medications for this visit  She is allergic to pollen extract       Menstrual History:  OB History        1    Para   1    Term   1            AB        Living   1       SAB        TAB        Ectopic        Multiple        Live Births   1                  No LMP recorded  (Menstrual status: Birth Control)  Review of Systems   Constitutional: Negative for fatigue, fever and unexpected weight change  HENT: Negative for dental problem and sinus pressure  Eyes: Negative for visual disturbance  Respiratory: Negative for cough, shortness of breath and wheezing  Cardiovascular: Negative for chest pain  Gastrointestinal: Negative for abdominal pain, blood in stool, constipation, diarrhea, nausea and vomiting  Endocrine: Negative for polydipsia  Genitourinary: Negative for difficulty urinating, dyspareunia, dysuria, frequency, hematuria, pelvic pain and urgency  Musculoskeletal: Negative for arthralgias and back pain  Neurological: Negative for dizziness, seizures, light-headedness and headaches  Psychiatric/Behavioral: Negative for suicidal ideas  The patient is not nervous/anxious  Objective:  Vitals:    20 0909   BP: 122/84   BP Location: Left arm   Patient Position: Sitting   Cuff Size: Standard   Weight: 91 6 kg (202 lb)   Height: 5' 8" (1 727 m)      Physical Exam   Constitutional: She is oriented to person, place, and time  She appears well-developed and well-nourished  Genitourinary: Rectum normal, vagina normal and uterus normal  Pelvic exam was performed with patient supine  There is no rash, tenderness, lesion, injury or Bartholin's cyst on the right labia  There is no rash, tenderness, lesion, injury or Bartholin's cyst on the left labia  Vagina exhibits no lesion  No erythema, tenderness or bleeding in the vagina  No signs of injury around the vagina  No vaginal discharge found   Right adnexum does not display mass, does not display tenderness and does not display fullness  Left adnexum does not display mass, does not display tenderness and does not display fullness  Cervix does not exhibit motion tenderness, lesion or discharge  Uterus is not enlarged, tender, exhibiting a mass, irregular (is regular) or mobile  Rectal exam shows no external hemorrhoid, no internal hemorrhoid, no fissure, no mass, no tenderness, anal tone normal and guaiac negative stool  HENT:   Head: Normocephalic and atraumatic  Neck: No thyromegaly present  Cardiovascular: Normal rate, regular rhythm and normal heart sounds  Exam reveals no gallop and no friction rub  No murmur heard  Pulmonary/Chest: Effort normal and breath sounds normal  No respiratory distress  She has no wheezes  Right breast exhibits no inverted nipple, no mass, no nipple discharge, no skin change and no tenderness  Left breast exhibits no inverted nipple, no mass, no nipple discharge, no skin change and no tenderness  No breast swelling, tenderness, discharge or bleeding  Breasts are symmetrical    Abdominal: Soft  She exhibits no distension and no mass  There is no tenderness  There is no rebound and no guarding  No hernia  Lymphadenopathy:     She has no cervical adenopathy  Right: No inguinal adenopathy present  Left: No inguinal adenopathy present  Neurological: She is alert and oriented to person, place, and time  Skin: Skin is warm and dry  Psychiatric: She has a normal mood and affect   Her behavior is normal

## 2020-03-15 LAB
HPV HR 12 DNA CVX QL NAA+PROBE: NEGATIVE
HPV16 DNA CVX QL NAA+PROBE: NEGATIVE
HPV18 DNA CVX QL NAA+PROBE: NEGATIVE

## 2020-03-17 LAB
LAB AP GYN PRIMARY INTERPRETATION: NORMAL
LAB AP LMP: NORMAL
Lab: NORMAL

## 2020-03-20 ENCOUNTER — COSMETIC (OUTPATIENT)
Dept: PLASTIC SURGERY | Facility: CLINIC | Age: 45
End: 2020-03-20

## 2020-03-20 VITALS — TEMPERATURE: 98.8 F

## 2020-03-20 DIAGNOSIS — Z41.1 ENCOUNTER FOR COSMETIC PROCEDURE: ICD-10-CM

## 2020-03-20 PROCEDURE — RECHECK: Performed by: SURGERY

## 2020-03-20 PROCEDURE — BOTOX1U PR BOTOX BY THE UNIT: Performed by: SURGERY

## 2020-03-20 NOTE — PROGRESS NOTES
Ellie returns for Botox, she has had some scheduling issues, hence the 1 year hiatus  While she was happy with her previous treatments here, she feels that some additional Botox may be helpful as she has noticed additional creases, especially at the level of the procerus  At today's visit 30 units of Botox was administered in the usual fashion to treat the glabella, brow and central forehead, of this 2 5 units was used at the level of the procerus  I suggested that she return in 3 months

## 2020-04-06 ENCOUNTER — TELEMEDICINE (OUTPATIENT)
Dept: FAMILY MEDICINE CLINIC | Facility: CLINIC | Age: 45
End: 2020-04-06

## 2020-04-06 DIAGNOSIS — E55.9 VITAMIN D DEFICIENCY: ICD-10-CM

## 2020-04-06 DIAGNOSIS — E03.9 HYPOTHYROIDISM, UNSPECIFIED TYPE: Primary | ICD-10-CM

## 2020-04-06 DIAGNOSIS — F41.8 DEPRESSION WITH ANXIETY: ICD-10-CM

## 2020-04-06 DIAGNOSIS — E78.1 HYPERTRIGLYCERIDEMIA, ESSENTIAL: ICD-10-CM

## 2020-04-06 PROCEDURE — 99442 PR PHYS/QHP TELEPHONE EVALUATION 11-20 MIN: CPT | Performed by: FAMILY MEDICINE

## 2020-04-06 RX ORDER — LEVOTHYROXINE SODIUM 0.12 MG/1
125 TABLET ORAL DAILY
Qty: 90 TABLET | Refills: 3 | Status: SHIPPED | OUTPATIENT
Start: 2020-04-06 | End: 2021-05-20

## 2020-04-08 ENCOUNTER — COSMETIC (OUTPATIENT)
Dept: PLASTIC SURGERY | Facility: CLINIC | Age: 45
End: 2020-04-08

## 2020-04-08 VITALS — TEMPERATURE: 98.2 F

## 2020-04-08 DIAGNOSIS — Z41.1 ENCOUNTER FOR COSMETIC PROCEDURE: Primary | ICD-10-CM

## 2020-04-08 PROCEDURE — RECHECK: Performed by: SURGERY

## 2020-04-28 DIAGNOSIS — F41.1 GENERALIZED ANXIETY DISORDER: ICD-10-CM

## 2020-04-28 RX ORDER — ALPRAZOLAM 0.5 MG/1
0.5 TABLET ORAL DAILY PRN
Qty: 30 TABLET | Refills: 0 | Status: SHIPPED | OUTPATIENT
Start: 2020-04-28 | End: 2020-07-27

## 2020-07-27 DIAGNOSIS — F41.1 GENERALIZED ANXIETY DISORDER: ICD-10-CM

## 2020-07-27 RX ORDER — ALPRAZOLAM 0.5 MG/1
0.5 TABLET ORAL DAILY PRN
Qty: 30 TABLET | Refills: 0 | Status: SHIPPED | OUTPATIENT
Start: 2020-07-27 | End: 2020-10-22

## 2020-07-27 NOTE — TELEPHONE ENCOUNTER
Medication:Alprazolam  PDMP   04/28/2020  1  04/28/2020  ALPRAZOLAM 0 5 MG TABLET  30 0  30  TI SHE   Active agreement on file -No

## 2020-08-06 ENCOUNTER — TELEMEDICINE (OUTPATIENT)
Dept: FAMILY MEDICINE CLINIC | Facility: CLINIC | Age: 45
End: 2020-08-06
Payer: COMMERCIAL

## 2020-08-06 VITALS — TEMPERATURE: 98 F

## 2020-08-06 DIAGNOSIS — R50.9 FEVER, UNSPECIFIED FEVER CAUSE: Primary | ICD-10-CM

## 2020-08-06 DIAGNOSIS — R50.9 FEVER, UNSPECIFIED FEVER CAUSE: ICD-10-CM

## 2020-08-06 PROCEDURE — 1036F TOBACCO NON-USER: CPT | Performed by: NURSE PRACTITIONER

## 2020-08-06 PROCEDURE — U0003 INFECTIOUS AGENT DETECTION BY NUCLEIC ACID (DNA OR RNA); SEVERE ACUTE RESPIRATORY SYNDROME CORONAVIRUS 2 (SARS-COV-2) (CORONAVIRUS DISEASE [COVID-19]), AMPLIFIED PROBE TECHNIQUE, MAKING USE OF HIGH THROUGHPUT TECHNOLOGIES AS DESCRIBED BY CMS-2020-01-R: HCPCS | Performed by: NURSE PRACTITIONER

## 2020-08-06 PROCEDURE — 99213 OFFICE O/P EST LOW 20 MIN: CPT | Performed by: NURSE PRACTITIONER

## 2020-08-06 NOTE — PROGRESS NOTES
Virtual Regular Visit      Assessment/Plan:    Problem List Items Addressed This Visit     None      Visit Diagnoses     Fever, unspecified fever cause    -  Primary    Relevant Orders    Novel Coronavirus (COVID-19), PCR [facility] - Collected at [facility] or [facility]               Reason for visit is   Chief Complaint   Patient presents with    Virtual Regular Visit        Encounter provider MIKE Rhodes    Provider located at [address] [address]      Recent Visits  No visits were found meeting these conditions  Showing recent visits within past 7 days and meeting all other requirements     Today's Visits  Date Type Provider Dept   08/06/20 Telemedicine MIKE Rhodes  Lyn Rosales   Showing today's visits and meeting all other requirements     Future Appointments  No visits were found meeting these conditions  Showing future appointments within next 150 days and meeting all other requirements        The patient was identified by name and date of birth  Ebenezer Goddard was informed that this is a telemedicine visit and that the visit is being conducted through [facility] and patient was informed that this is a secure, HIPAA-compliant platform  She agrees to proceed     My office door was closed  No one else was in the room  She acknowledged consent and understanding of privacy and security of the video platform  The patient has agreed to participate and understands they can discontinue the visit at any time  Patient is aware this is a billable service  Subjective  Ebenezer Goddard is a 38 y o  female          Ill now  Ex  had son and traveled across the country  Started to not feel well yesterday am  Fever 100  Sudden onset  Fatigue  Body aches and does not feel well         Past Medical History:   Diagnosis Date    Arthritis     Asthma     Breast lump     resolved [id number]    Depression     Disease of thyroid gland     Hair loss     Hemorrhage of conjunctiva, left     last assessed [id number]    Lyme disease     lastt assessed [id number]    Thyroid disease        Past Surgical History:   Procedure Laterality Date    LUMBAR FUSION  01/01/2004    Lumbar Vertebral Fusion;  2004       Current Outpatient Medications   Medication Sig Dispense Refill    albuterol (PROAIR HFA) 90 mcg/act inhaler Inhale 2 puffs every 6 (six) hours as needed for wheezing 8 5 g 0    ALPRAZolam (XANAX) 0 5 mg tablet Take 1 tablet (0 5 mg total) by mouth daily as needed for anxiety 30 tablet 0    buPROPion (WELLBUTRIN SR) 150 mg 12 hr tablet Take 1 tablet (150 mg total) by mouth 2 (two) times a day 180 tablet 3    levothyroxine 125 mcg tablet Take 1 tablet (125 mcg total) by mouth daily 90 tablet 3    ZOLMitriptan (ZOMIG) 5 MG tablet Take 1 tablet (5 mg total) by mouth once as needed for migraine for up to 1 dose 6 tablet 5    Cholecalciferol (VITAMIN D3) 1 25 MG (95166 UT) TABS Take 1 tablet (50,000 Units total) by mouth once a week for 8 doses 8 tablet 5    drospirenone-ethinyl estradiol (BEATRIZ) 3-0 02 MG per tablet Take 1 tablet by mouth daily 90 tablet 3     No current facility-administered medications for this visit  Allergies   Allergen Reactions    Pollen Extract        Review of Systems   Constitutional: Positive for chills, fatigue and fever  HENT: Negative for congestion, postnasal drip and rhinorrhea  Respiratory: Positive for cough  Negative for shortness of breath and wheezing  Cardiovascular: Negative for chest pain and palpitations  Gastrointestinal: Negative for constipation and diarrhea  Genitourinary: Negative for dysuria and frequency  Musculoskeletal: Positive for arthralgias and myalgias  Neurological: Positive for headaches  Negative for dizziness and light-headedness  Hematological: Negative for adenopathy  Psychiatric/Behavioral: Negative for dysphoric mood   The patient is not nervous/anxious  Video Exam    Vitals:    08/06/20 1457   Temp: 98 °F (36 7 °C)       Physical Exam   Constitutional: She is oriented to person, place, and time  HENT:   Head: Normocephalic and atraumatic  Eyes: Conjunctivae are normal    Pulmonary/Chest: Effort normal and breath sounds normal    Abdominal: Normal appearance  Musculoskeletal: Normal range of motion  Neurological: She is alert and oriented to person, place, and time  Psychiatric: Her behavior is normal  Mood, judgment and thought content normal    Nursing note and vitals reviewed  I spent 15 minutes with patient today in which greater than 50% of the time was spent in counseling/coordination of care regarding body aches, fever      VIRTUAL VISIT DISCLAIMER    Ellie Moreno acknowledges that she has consented to an online visit or consultation  She understands that the online visit is based solely on information provided by her, and that, in the absence of a face-to-face physical evaluation by the physician, the diagnosis she receives is both limited and provisional in terms of accuracy and completeness  This is not intended to replace a full medical face-to-face evaluation by the physician  Ellie Cross understands and accepts these terms

## 2020-08-07 LAB — SARS-COV-2 RNA SPEC QL NAA+PROBE: NOT DETECTED

## 2020-10-21 DIAGNOSIS — F41.1 GENERALIZED ANXIETY DISORDER: ICD-10-CM

## 2020-10-22 RX ORDER — ALPRAZOLAM 0.5 MG/1
TABLET ORAL
Qty: 30 TABLET | Refills: 0 | Status: SHIPPED | OUTPATIENT
Start: 2020-10-22 | End: 2021-07-20 | Stop reason: SDUPTHER

## 2020-11-16 ENCOUNTER — TELEPHONE (OUTPATIENT)
Dept: FAMILY MEDICINE CLINIC | Facility: CLINIC | Age: 45
End: 2020-11-16

## 2020-11-16 DIAGNOSIS — Z20.822 EXPOSURE TO COVID-19 VIRUS: ICD-10-CM

## 2020-11-16 DIAGNOSIS — Z20.822 EXPOSURE TO COVID-19 VIRUS: Primary | ICD-10-CM

## 2020-11-16 PROCEDURE — U0003 INFECTIOUS AGENT DETECTION BY NUCLEIC ACID (DNA OR RNA); SEVERE ACUTE RESPIRATORY SYNDROME CORONAVIRUS 2 (SARS-COV-2) (CORONAVIRUS DISEASE [COVID-19]), AMPLIFIED PROBE TECHNIQUE, MAKING USE OF HIGH THROUGHPUT TECHNOLOGIES AS DESCRIBED BY CMS-2020-01-R: HCPCS | Performed by: FAMILY MEDICINE

## 2020-11-17 LAB — SARS-COV-2 RNA SPEC QL NAA+PROBE: NOT DETECTED

## 2020-12-23 ENCOUNTER — IMMUNIZATIONS (OUTPATIENT)
Dept: FAMILY MEDICINE CLINIC | Facility: HOSPITAL | Age: 45
End: 2020-12-23
Payer: COMMERCIAL

## 2020-12-23 DIAGNOSIS — Z23 ENCOUNTER FOR IMMUNIZATION: ICD-10-CM

## 2020-12-23 PROCEDURE — 0001A SARS-COV-2 / COVID-19 MRNA VACCINE (PFIZER-BIONTECH) 30 MCG: CPT

## 2020-12-23 PROCEDURE — 91300 SARS-COV-2 / COVID-19 MRNA VACCINE (PFIZER-BIONTECH) 30 MCG: CPT

## 2021-01-14 ENCOUNTER — IMMUNIZATIONS (OUTPATIENT)
Dept: FAMILY MEDICINE CLINIC | Facility: HOSPITAL | Age: 46
End: 2021-01-14

## 2021-01-14 DIAGNOSIS — Z23 ENCOUNTER FOR IMMUNIZATION: Primary | ICD-10-CM

## 2021-01-14 DIAGNOSIS — H02.403 PTOSIS OF BOTH EYELIDS: Primary | ICD-10-CM

## 2021-01-14 PROCEDURE — 91300 SARS-COV-2 / COVID-19 MRNA VACCINE (PFIZER-BIONTECH) 30 MCG: CPT

## 2021-01-14 PROCEDURE — 0002A SARS-COV-2 / COVID-19 MRNA VACCINE (PFIZER-BIONTECH) 30 MCG: CPT

## 2021-03-11 ENCOUNTER — HOSPITAL ENCOUNTER (OUTPATIENT)
Dept: RADIOLOGY | Age: 46
Discharge: HOME | End: 2021-03-11
Payer: COMMERCIAL

## 2021-03-11 VITALS — WEIGHT: 200 LBS | HEIGHT: 68 IN | BODY MASS INDEX: 30.31 KG/M2

## 2021-03-11 DIAGNOSIS — Z30.41 ENCOUNTER FOR SURVEILLANCE OF CONTRACEPTIVE PILLS: ICD-10-CM

## 2021-03-11 DIAGNOSIS — Z12.31 ENCOUNTER FOR SCREENING MAMMOGRAM FOR MALIGNANT NEOPLASM OF BREAST: ICD-10-CM

## 2021-03-11 PROCEDURE — 77067 SCR MAMMO BI INCL CAD: CPT

## 2021-03-11 PROCEDURE — 77063 BREAST TOMOSYNTHESIS BI: CPT

## 2021-03-12 RX ORDER — DROSPIRENONE AND ETHINYL ESTRADIOL 0.02-3(28)
1 KIT ORAL DAILY
Qty: 90 TABLET | Refills: 0 | Status: SHIPPED | OUTPATIENT
Start: 2021-03-12 | End: 2021-05-20 | Stop reason: SDUPTHER

## 2021-05-19 DIAGNOSIS — E03.9 HYPOTHYROIDISM, UNSPECIFIED TYPE: ICD-10-CM

## 2021-05-20 ENCOUNTER — ANNUAL EXAM (OUTPATIENT)
Dept: OBGYN CLINIC | Facility: CLINIC | Age: 46
End: 2021-05-20
Payer: COMMERCIAL

## 2021-05-20 ENCOUNTER — TELEPHONE (OUTPATIENT)
Dept: OBGYN CLINIC | Facility: CLINIC | Age: 46
End: 2021-05-20

## 2021-05-20 VITALS
BODY MASS INDEX: 28.49 KG/M2 | SYSTOLIC BLOOD PRESSURE: 116 MMHG | HEIGHT: 68 IN | WEIGHT: 188 LBS | DIASTOLIC BLOOD PRESSURE: 80 MMHG

## 2021-05-20 DIAGNOSIS — Z01.419 ROUTINE GYNECOLOGICAL EXAMINATION: Primary | ICD-10-CM

## 2021-05-20 DIAGNOSIS — Z30.41 ENCOUNTER FOR SURVEILLANCE OF CONTRACEPTIVE PILLS: ICD-10-CM

## 2021-05-20 PROCEDURE — 99396 PREV VISIT EST AGE 40-64: CPT | Performed by: PHYSICIAN ASSISTANT

## 2021-05-20 PROCEDURE — G0145 SCR C/V CYTO,THINLAYER,RESCR: HCPCS | Performed by: PHYSICIAN ASSISTANT

## 2021-05-20 RX ORDER — DROSPIRENONE AND ETHINYL ESTRADIOL 0.02-3(28)
1 KIT ORAL DAILY
Qty: 90 TABLET | Refills: 3 | Status: SHIPPED | OUTPATIENT
Start: 2021-05-20 | End: 2021-12-13

## 2021-05-20 RX ORDER — LEVOTHYROXINE SODIUM 0.12 MG/1
TABLET ORAL
Qty: 90 TABLET | Refills: 0 | Status: SHIPPED | OUTPATIENT
Start: 2021-05-20 | End: 2021-09-07 | Stop reason: SDUPTHER

## 2021-05-20 NOTE — PROGRESS NOTES
Assessment/Plan   Problem List Items Addressed This Visit        Other    Routine gynecological examination - Primary     Pap guidelines reviewed  Pap with HPV done today  Will plan to continue Beatriz OCP, script renewed to pharmacy  Reviewed breakthrough bleeding, only happened in April, will continue to monitor  Reviewed very small polyp seen on exam today  Patient reports had polyp removed in the past, may be fragment left from previous polyp  Will continue to monitor and call office if has any more irregular bleeding  Return to office for annual or as needed  Relevant Orders    Liquid-based pap, screening      Other Visit Diagnoses     Encounter for surveillance of contraceptive pills        Relevant Medications    drospirenone-ethinyl estradiol (BEATRIZ) 3-0 02 MG per tablet          Subjective:     Patient ID: Susy Astorga is a 41 y o  y o  female  HPI  42 yo seen for annual exam  Currently on Beatriz OCP, tolerating well would like to continue  Typically takes continuously  Has gotten breakthrough bleeding in 4/2021, thought secondary to COVID vaccine  Denies bowel or bladder issues  Last pap: 3/11/2020 NILM (-)HRHPV  Last mammogram: 3/11/2021 BIRADS 1: Negative  The following portions of the patient's history were reviewed and updated as appropriate:   She  has a past medical history of Arthritis, Asthma, Breast lump, Depression, Disease of thyroid gland, Hair loss, Hemorrhage of conjunctiva, left, Lyme disease, and Thyroid disease    She   Patient Active Problem List    Diagnosis Date Noted    Routine gynecological examination 05/20/2021    Gynecologic exam normal 03/11/2020    Mild intermittent asthma without complication 41/91/5099    Cervical polyp 03/01/2018    Iron deficiency 04/28/2017    Atypical glandular cell changes cervix of undetermined significance favor dysplasia 03/20/2017    Deviated nasal septum 01/09/2017    Obesity 04/14/2016    Primary osteoarthritis of left knee 07/29/2015    Herpes labialis 12/18/2014    Vitamin B12 deficiency 07/28/2014    Vitamin D deficiency 07/28/2014    Depression with anxiety 02/05/2013    Allergic rhinitis 07/09/2012    Fibromyalgia 07/09/2012    Hypothyroidism 07/09/2012    Migraine 07/09/2012     She  has a past surgical history that includes Lumbar fusion (01/01/2004)  Her family history includes Arthritis in her maternal grandmother; Atrial fibrillation in her mother; Cancer in her maternal grandmother; Diabetes in her father; Heart disease in her mother; Hyperlipidemia in her father and mother; Hypertension in her father and mother; Leukemia in her maternal grandmother; No Known Problems in her paternal aunt, paternal aunt, paternal aunt, paternal grandfather, paternal grandmother, sister, and sister; Pancreatic cancer (age of onset: 72) in her maternal grandfather; Stroke in her maternal grandfather; Supraventricular tachycardia in her mother  She  reports that she has never smoked  She has never used smokeless tobacco  She reports current alcohol use  She reports that she does not use drugs    Current Outpatient Medications   Medication Sig Dispense Refill    albuterol (PROAIR HFA) 90 mcg/act inhaler Inhale 2 puffs every 6 (six) hours as needed for wheezing 8 5 g 0    ALPRAZolam (XANAX) 0 5 mg tablet TAKE 1 TABLET BY MOUTH ONCE DAILY AS NEEDED FOR ANXIETY 30 tablet 0    buPROPion (WELLBUTRIN SR) 150 mg 12 hr tablet Take 1 tablet (150 mg total) by mouth 2 (two) times a day 180 tablet 3    drospirenone-ethinyl estradiol (BEATRIZ) 3-0 02 MG per tablet Take 1 tablet by mouth daily 90 tablet 3    ZOLMitriptan (ZOMIG) 5 MG tablet Take 1 tablet (5 mg total) by mouth once as needed for migraine for up to 1 dose 6 tablet 5    Cholecalciferol (VITAMIN D3) 1 25 MG (08020 UT) TABS Take 1 tablet (50,000 Units total) by mouth once a week for 8 doses 8 tablet 5    levothyroxine 125 mcg tablet TAKE ONE TABLET BY MOUTH DAILY 90 tablet 0     No current facility-administered medications for this visit  She is allergic to pollen extract       Menstrual History:  OB History        1    Para   1    Term   1            AB        Living   1       SAB        TAB        Ectopic        Multiple        Live Births   1                  No LMP recorded (lmp unknown)  (Menstrual status: Birth Control)  Review of Systems   Constitutional: Negative for fatigue, fever and unexpected weight change  HENT: Negative for dental problem and sinus pressure  Eyes: Negative for visual disturbance  Respiratory: Negative for cough, shortness of breath and wheezing  Cardiovascular: Negative for chest pain  Gastrointestinal: Negative for abdominal pain, blood in stool, constipation, diarrhea, nausea and vomiting  Endocrine: Negative for polydipsia  Genitourinary: Negative for difficulty urinating, dyspareunia, dysuria, frequency, hematuria, pelvic pain and urgency  Musculoskeletal: Negative for arthralgias and back pain  Neurological: Negative for dizziness, seizures, light-headedness and headaches  Psychiatric/Behavioral: Negative for suicidal ideas  The patient is not nervous/anxious  Objective:  Vitals:    21 0801   BP: 116/80   BP Location: Left arm   Patient Position: Sitting   Cuff Size: Standard   Weight: 85 3 kg (188 lb)   Height: 5' 8" (1 727 m)      Physical Exam  Constitutional:       Appearance: Normal appearance  She is well-developed  Genitourinary:      Pelvic exam was performed with patient supine  Vulva, urethra, bladder, vagina, uterus and rectum normal       No vulval condylomata, lesion, tenderness, ulcerations, Bartholin's cyst or rash noted  No signs of labial injury  No labial fusion  No inguinal adenopathy present in the right or left side  No urethral prolapse, pain, swelling, tenderness, caruncle, mass or diverticulum present  Bladder is not distended or tender        No signs of injury or lesions in the vagina  No vaginal discharge, erythema, tenderness or bleeding  Cervical polyp present  No cervical motion tenderness, discharge or lesion  Uterus is not enlarged, tender, irregular or mobile  No uterine mass detected  No right or left adnexal mass present  Right adnexa not tender or full  Left adnexa not tender or full  Rectum:      Guaiac result negative  No rectal mass, anal fissure, tenderness, external hemorrhoid, internal hemorrhoid or abnormal anal tone  HENT:      Head: Normocephalic and atraumatic  Neck:      Thyroid: No thyromegaly  Cardiovascular:      Rate and Rhythm: Normal rate and regular rhythm  Heart sounds: Normal heart sounds  No murmur  No friction rub  No gallop  Pulmonary:      Effort: Pulmonary effort is normal  No respiratory distress  Breath sounds: Normal breath sounds  No wheezing  Chest:      Breasts: Breasts are symmetrical          Right: Normal  No swelling, bleeding, inverted nipple, mass, nipple discharge, skin change or tenderness  Left: Normal  No swelling, bleeding, inverted nipple, mass, nipple discharge, skin change or tenderness  Abdominal:      General: There is no distension  Palpations: Abdomen is soft  There is no mass  Tenderness: There is no abdominal tenderness  There is no guarding or rebound  Hernia: No hernia is present  Lymphadenopathy:      Cervical: No cervical adenopathy  Upper Body:      Right upper body: No supraclavicular, axillary or pectoral adenopathy  Left upper body: No supraclavicular, axillary or pectoral adenopathy  Lower Body: No right inguinal adenopathy  No left inguinal adenopathy  Neurological:      Mental Status: She is alert and oriented to person, place, and time  Skin:     General: Skin is warm and dry     Psychiatric:         Behavior: Behavior normal

## 2021-05-20 NOTE — ASSESSMENT & PLAN NOTE
Pap guidelines reviewed  Pap with HPV done today  Will plan to continue Darline OCP, script renewed to pharmacy  Reviewed breakthrough bleeding, only happened in April, will continue to monitor  Reviewed very small polyp seen on exam today  Patient reports had polyp removed in the past, may be fragment left from previous polyp  Will continue to monitor and call office if has any more irregular bleeding  Return to office for annual or as needed

## 2021-05-20 NOTE — TELEPHONE ENCOUNTER
Patient here this morning for pap and was told had a polyp   Started bleeding pretty heavily and just wants to be sure from sample taken

## 2021-05-26 LAB
LAB AP GYN PRIMARY INTERPRETATION: NORMAL
LAB AP LMP: NORMAL
Lab: NORMAL

## 2021-06-15 ENCOUNTER — APPOINTMENT (OUTPATIENT)
Dept: LAB | Facility: CLINIC | Age: 46
End: 2021-06-15

## 2021-06-15 ENCOUNTER — TRANSCRIBE ORDERS (OUTPATIENT)
Dept: LAB | Facility: CLINIC | Age: 46
End: 2021-06-15

## 2021-06-15 DIAGNOSIS — Z00.8 HEALTH EXAMINATION IN POPULATION SURVEY: ICD-10-CM

## 2021-06-15 DIAGNOSIS — Z00.8 HEALTH EXAMINATION IN POPULATION SURVEY: Primary | ICD-10-CM

## 2021-06-15 LAB
CHOLEST SERPL-MCNC: 203 MG/DL (ref 50–200)
EST. AVERAGE GLUCOSE BLD GHB EST-MCNC: 114 MG/DL
HBA1C MFR BLD: 5.6 %
HDLC SERPL-MCNC: 68 MG/DL
LDLC SERPL CALC-MCNC: 93 MG/DL (ref 0–100)
NONHDLC SERPL-MCNC: 135 MG/DL
TRIGL SERPL-MCNC: 210 MG/DL

## 2021-06-15 PROCEDURE — 83036 HEMOGLOBIN GLYCOSYLATED A1C: CPT

## 2021-06-15 PROCEDURE — 36415 COLL VENOUS BLD VENIPUNCTURE: CPT

## 2021-06-15 PROCEDURE — 80061 LIPID PANEL: CPT

## 2021-07-01 DIAGNOSIS — F41.8 DEPRESSION WITH ANXIETY: ICD-10-CM

## 2021-07-01 RX ORDER — BUPROPION HYDROCHLORIDE 150 MG/1
150 TABLET, EXTENDED RELEASE ORAL 2 TIMES DAILY
Qty: 180 TABLET | Refills: 0 | Status: SHIPPED | OUTPATIENT
Start: 2021-07-01 | End: 2021-12-13

## 2021-07-13 ENCOUNTER — APPOINTMENT (OUTPATIENT)
Dept: LAB | Facility: AMBULARY SURGERY CENTER | Age: 46
End: 2021-07-13
Payer: COMMERCIAL

## 2021-07-13 ENCOUNTER — OFFICE VISIT (OUTPATIENT)
Dept: FAMILY MEDICINE CLINIC | Facility: CLINIC | Age: 46
End: 2021-07-13
Payer: COMMERCIAL

## 2021-07-13 VITALS
WEIGHT: 185.2 LBS | TEMPERATURE: 97.9 F | HEART RATE: 98 BPM | HEIGHT: 68 IN | DIASTOLIC BLOOD PRESSURE: 80 MMHG | BODY MASS INDEX: 28.07 KG/M2 | SYSTOLIC BLOOD PRESSURE: 100 MMHG | OXYGEN SATURATION: 97 % | RESPIRATION RATE: 16 BRPM

## 2021-07-13 DIAGNOSIS — E55.9 VITAMIN D DEFICIENCY: ICD-10-CM

## 2021-07-13 DIAGNOSIS — E03.9 ACQUIRED HYPOTHYROIDISM: ICD-10-CM

## 2021-07-13 DIAGNOSIS — E53.8 VITAMIN B12 DEFICIENCY: ICD-10-CM

## 2021-07-13 DIAGNOSIS — E03.9 ACQUIRED HYPOTHYROIDISM: Primary | ICD-10-CM

## 2021-07-13 DIAGNOSIS — G43.909 MIGRAINE WITHOUT STATUS MIGRAINOSUS, NOT INTRACTABLE, UNSPECIFIED MIGRAINE TYPE: ICD-10-CM

## 2021-07-13 DIAGNOSIS — F32.5 MAJOR DEPRESSIVE DISORDER WITH SINGLE EPISODE, IN FULL REMISSION (HCC): ICD-10-CM

## 2021-07-13 DIAGNOSIS — E61.1 IRON DEFICIENCY: ICD-10-CM

## 2021-07-13 PROBLEM — F41.1 GENERALIZED ANXIETY DISORDER: Status: ACTIVE | Noted: 2021-07-13

## 2021-07-13 LAB
25(OH)D3 SERPL-MCNC: 23.9 NG/ML (ref 30–100)
ALBUMIN SERPL BCP-MCNC: 3 G/DL (ref 3.5–5)
ALP SERPL-CCNC: 82 U/L (ref 46–116)
ALT SERPL W P-5'-P-CCNC: 21 U/L (ref 12–78)
ANION GAP SERPL CALCULATED.3IONS-SCNC: 8 MMOL/L (ref 4–13)
AST SERPL W P-5'-P-CCNC: 15 U/L (ref 5–45)
BASOPHILS # BLD AUTO: 0.08 THOUSANDS/ΜL (ref 0–0.1)
BASOPHILS NFR BLD AUTO: 1 % (ref 0–1)
BILIRUB SERPL-MCNC: 0.51 MG/DL (ref 0.2–1)
BUN SERPL-MCNC: 10 MG/DL (ref 5–25)
CALCIUM ALBUM COR SERPL-MCNC: 10.3 MG/DL (ref 8.3–10.1)
CALCIUM SERPL-MCNC: 9.5 MG/DL (ref 8.3–10.1)
CHLORIDE SERPL-SCNC: 106 MMOL/L (ref 100–108)
CO2 SERPL-SCNC: 23 MMOL/L (ref 21–32)
CREAT SERPL-MCNC: 0.82 MG/DL (ref 0.6–1.3)
EOSINOPHIL # BLD AUTO: 0.19 THOUSAND/ΜL (ref 0–0.61)
EOSINOPHIL NFR BLD AUTO: 3 % (ref 0–6)
ERYTHROCYTE [DISTWIDTH] IN BLOOD BY AUTOMATED COUNT: 12.9 % (ref 11.6–15.1)
FERRITIN SERPL-MCNC: 19 NG/ML (ref 8–388)
GFR SERPL CREATININE-BSD FRML MDRD: 87 ML/MIN/1.73SQ M
GLUCOSE P FAST SERPL-MCNC: 93 MG/DL (ref 65–99)
HCT VFR BLD AUTO: 41.3 % (ref 34.8–46.1)
HGB BLD-MCNC: 13.1 G/DL (ref 11.5–15.4)
IMM GRANULOCYTES # BLD AUTO: 0.02 THOUSAND/UL (ref 0–0.2)
IMM GRANULOCYTES NFR BLD AUTO: 0 % (ref 0–2)
IRON SATN MFR SERPL: 12 %
IRON SERPL-MCNC: 60 UG/DL (ref 50–170)
LYMPHOCYTES # BLD AUTO: 2.46 THOUSANDS/ΜL (ref 0.6–4.47)
LYMPHOCYTES NFR BLD AUTO: 36 % (ref 14–44)
MCH RBC QN AUTO: 29.2 PG (ref 26.8–34.3)
MCHC RBC AUTO-ENTMCNC: 31.7 G/DL (ref 31.4–37.4)
MCV RBC AUTO: 92 FL (ref 82–98)
MONOCYTES # BLD AUTO: 0.3 THOUSAND/ΜL (ref 0.17–1.22)
MONOCYTES NFR BLD AUTO: 4 % (ref 4–12)
NEUTROPHILS # BLD AUTO: 3.88 THOUSANDS/ΜL (ref 1.85–7.62)
NEUTS SEG NFR BLD AUTO: 56 % (ref 43–75)
NRBC BLD AUTO-RTO: 0 /100 WBCS
PLATELET # BLD AUTO: 339 THOUSANDS/UL (ref 149–390)
PMV BLD AUTO: 10.2 FL (ref 8.9–12.7)
POTASSIUM SERPL-SCNC: 4.3 MMOL/L (ref 3.5–5.3)
PROT SERPL-MCNC: 7.6 G/DL (ref 6.4–8.2)
RBC # BLD AUTO: 4.49 MILLION/UL (ref 3.81–5.12)
SODIUM SERPL-SCNC: 137 MMOL/L (ref 136–145)
TIBC SERPL-MCNC: 506 UG/DL (ref 250–450)
TSH SERPL DL<=0.05 MIU/L-ACNC: 0.48 UIU/ML (ref 0.36–3.74)
VIT B12 SERPL-MCNC: 162 PG/ML (ref 100–900)
WBC # BLD AUTO: 6.93 THOUSAND/UL (ref 4.31–10.16)

## 2021-07-13 PROCEDURE — 82607 VITAMIN B-12: CPT

## 2021-07-13 PROCEDURE — 84443 ASSAY THYROID STIM HORMONE: CPT

## 2021-07-13 PROCEDURE — 85025 COMPLETE CBC W/AUTO DIFF WBC: CPT

## 2021-07-13 PROCEDURE — 36415 COLL VENOUS BLD VENIPUNCTURE: CPT

## 2021-07-13 PROCEDURE — 82306 VITAMIN D 25 HYDROXY: CPT

## 2021-07-13 PROCEDURE — 99214 OFFICE O/P EST MOD 30 MIN: CPT | Performed by: FAMILY MEDICINE

## 2021-07-13 PROCEDURE — 83540 ASSAY OF IRON: CPT

## 2021-07-13 PROCEDURE — 82728 ASSAY OF FERRITIN: CPT

## 2021-07-13 PROCEDURE — 83550 IRON BINDING TEST: CPT

## 2021-07-13 PROCEDURE — 80053 COMPREHEN METABOLIC PANEL: CPT

## 2021-07-13 NOTE — PROGRESS NOTES
Assessment/Plan:    1  Acquired hypothyroidism  Assessment & Plan:  Check tsh  On levo 125mcg currently but symptomatomatic now    Orders:  -     TSH, 3rd generation with Free T4 reflex; Future; Expected date: 07/13/2021    2  Iron deficiency  Assessment & Plan:  Check levels  History of low iron    Orders:  -     CBC and differential; Future; Expected date: 07/13/2021  -     Iron Panel (Includes Ferritin, Iron Sat%, Iron, and TIBC); Future; Expected date: 07/13/2021    3  Vitamin D deficiency  Assessment & Plan:  Not taking daily vitamin D    Orders:  -     Vitamin D 25 hydroxy; Future; Expected date: 07/13/2021    4  Major depressive disorder with single episode, in full remission [facility])  Assessment & Plan:  Stable and doing well    Orders:  -     Comprehensive metabolic panel; Future; Expected date: 07/13/2021    5  Vitamin B12 deficiency  -     Vitamin B12; Future; Expected date: 07/13/2021    6  Migraine without status migrainosus, not intractable, unspecified migraine type  -     Comprehensive metabolic panel; Future; Expected date: 07/13/2021    BMI Counseling: Body mass index is 28 16 kg/m²  The BMI is above normal  Nutrition recommendations include encouraging healthy choices of fruits and vegetables  Exercise recommendations include exercising 3-5 times per week  No pharmacotherapy was ordered  There are no Patient Instructions on file for this visit  Return in about 3 months (around 10/13/2021)  Subjective:      Patient ID: Chante Ding is a 41 y o  female      Chief Complaint   Patient presents with    Follow-up     medication refill       Here for follwou p  Exercising and feeling better  Losing hair and bruising  Not taking vitamins  No recent thyroid labs or iron labs  But mood wise better        The following portions of the patient's history were reviewed and updated as appropriate: allergies, current medications, past family history, past medical history, past social history, past surgical history and problem list     Review of Systems   Constitutional: Negative  HENT: Negative  Eyes: Negative  Respiratory: Negative  Cardiovascular: Negative  Gastrointestinal: Negative  Endocrine: Negative  Genitourinary: Negative  Skin:        Hair loss   Neurological: Negative  Hematological: Bruises/bleeds easily  Psychiatric/Behavioral: Negative for dysphoric mood  The patient is not nervous/anxious  Current Outpatient Medications   Medication Sig Dispense Refill    albuterol (PROAIR HFA) 90 mcg/act inhaler Inhale 2 puffs every 6 (six) hours as needed for wheezing 8 5 g 0    ALPRAZolam (XANAX) 0 5 mg tablet TAKE 1 TABLET BY MOUTH ONCE DAILY AS NEEDED FOR ANXIETY 30 tablet 0    buPROPion (WELLBUTRIN SR) 150 mg 12 hr tablet Take 1 tablet (150 mg total) by mouth 2 (two) times a day 180 tablet 0    Cholecalciferol (VITAMIN D3) 1 25 MG (94403 UT) TABS Take 1 tablet (50,000 Units total) by mouth once a week for 8 doses 8 tablet 5    drospirenone-ethinyl estradiol (BEATRIZ) 3-0 02 MG per tablet Take 1 tablet by mouth daily 90 tablet 3    levothyroxine 125 mcg tablet TAKE ONE TABLET BY MOUTH DAILY 90 tablet 0    ZOLMitriptan (ZOMIG) 5 MG tablet Take 1 tablet (5 mg total) by mouth once as needed for migraine for up to 1 dose 6 tablet 5     No current facility-administered medications for this visit  Objective:    /80   Pulse 98   Temp 97 9 °F (36 6 °C) (Tympanic)   Resp 16   Ht 5' 8" (1 727 m)   Wt 84 kg (185 lb 3 2 oz)   SpO2 97%   BMI 28 16 kg/m²        Physical Exam  Vitals and nursing note reviewed  Constitutional:       Appearance: Normal appearance  HENT:      Head: Normocephalic and atraumatic  Eyes:      Extraocular Movements: Extraocular movements intact  Conjunctiva/sclera: Conjunctivae normal       Pupils: Pupils are equal, round, and reactive to light  Cardiovascular:      Rate and Rhythm: Normal rate and regular rhythm  Pulses: Normal pulses  Heart sounds: Normal heart sounds  Pulmonary:      Effort: Pulmonary effort is normal       Breath sounds: Normal breath sounds  Abdominal:      General: Abdomen is flat  Palpations: Abdomen is soft  Musculoskeletal:      Cervical back: Normal range of motion and neck supple  Skin:     General: Skin is warm  Capillary Refill: Capillary refill takes less than 2 seconds  Neurological:      General: No focal deficit present  Mental Status: She is alert and oriented to person, place, and time  Psychiatric:         Mood and Affect: Mood normal          Behavior: Behavior normal          Thought Content:  Thought content normal          Judgment: Judgment normal                 Chau Joe DO

## 2021-07-22 ENCOUNTER — TELEPHONE (OUTPATIENT)
Dept: FAMILY MEDICINE CLINIC | Facility: CLINIC | Age: 46
End: 2021-07-22

## 2021-07-22 NOTE — TELEPHONE ENCOUNTER
Pt called her dog had to be put down  Patient has been under a lot of stress and now she has a huge cold blister from under her nose to her lip  Pt tried everything and nothing worked not even Amelia   pls advise if you can send something to [facility]

## 2021-07-30 ENCOUNTER — CLINICAL SUPPORT (OUTPATIENT)
Dept: FAMILY MEDICINE CLINIC | Facility: CLINIC | Age: 46
End: 2021-07-30
Payer: COMMERCIAL

## 2021-07-30 DIAGNOSIS — E53.8 B12 DEFICIENCY: Primary | ICD-10-CM

## 2021-07-30 PROCEDURE — 96372 THER/PROPH/DIAG INJ SC/IM: CPT

## 2021-07-30 RX ORDER — CYANOCOBALAMIN 1000 UG/ML
1000 INJECTION INTRAMUSCULAR; SUBCUTANEOUS
Status: DISCONTINUED | OUTPATIENT
Start: 2021-07-30 | End: 2022-01-20

## 2021-07-30 RX ADMIN — CYANOCOBALAMIN 1000 MCG: 1000 INJECTION INTRAMUSCULAR; SUBCUTANEOUS at 15:16

## 2021-07-30 NOTE — PROGRESS NOTES
Assessment/Plan:    No problem-specific Assessment & Plan notes found for this encounter  Diagnoses and all orders for this visit:    B12 deficiency  -     cyanocobalamin injection 1,000 mcg          Subjective:      Patient ID: Lester Galdamez is a 41 y o  female  HPI        Review of Systems      Objective: There were no vitals taken for this visit           Physical Exam

## 2021-08-17 DIAGNOSIS — B00.1 COLD SORE: ICD-10-CM

## 2021-08-17 RX ORDER — VALACYCLOVIR HYDROCHLORIDE 1 G/1
1000 TABLET, FILM COATED ORAL 2 TIMES DAILY
Qty: 10 TABLET | Refills: 0 | Status: SHIPPED | OUTPATIENT
Start: 2021-08-17 | End: 2022-05-13 | Stop reason: SDUPTHER

## 2021-08-17 NOTE — TELEPHONE ENCOUNTER
Pt had a death in the family and feels another cold sore coming on         Medication: valACYclovir (VALTREX)   Dosage: 1,000 mg tablet  How Often:  Take 1 tablet (1,000 mg total) by mouth 2 (two) times a day for 5 days  Quantity:  10  Last Office Visit: 7/13/2021  Next Office Visit:11/17/2021  Last refilled:07/22/2021  How many pills left:0  Pharmacy:   [facility] [place] [address]  Phone: [phone] Fax: [phone]

## 2021-09-08 ENCOUNTER — CLINICAL SUPPORT (OUTPATIENT)
Dept: FAMILY MEDICINE CLINIC | Facility: CLINIC | Age: 46
End: 2021-09-08
Payer: COMMERCIAL

## 2021-09-08 DIAGNOSIS — E53.8 B12 DEFICIENCY: Primary | ICD-10-CM

## 2021-09-08 PROCEDURE — 96372 THER/PROPH/DIAG INJ SC/IM: CPT

## 2021-09-08 RX ADMIN — CYANOCOBALAMIN 1000 MCG: 1000 INJECTION INTRAMUSCULAR; SUBCUTANEOUS at 09:23

## 2021-09-08 NOTE — PROGRESS NOTES
Assessment/Plan:    No problem-specific Assessment & Plan notes found for this encounter  Diagnoses and all orders for this visit:    B12 deficiency          Subjective:      Patient ID: Anju Rodriguez is a 41 y o  female  HPI        Review of Systems      Objective: There were no vitals taken for this visit           Physical Exam

## 2021-09-30 ENCOUNTER — CLINICAL SUPPORT (OUTPATIENT)
Dept: FAMILY MEDICINE CLINIC | Facility: CLINIC | Age: 46
End: 2021-09-30
Payer: COMMERCIAL

## 2021-09-30 DIAGNOSIS — E53.8 VITAMIN B12 DEFICIENCY: Primary | ICD-10-CM

## 2021-09-30 PROCEDURE — 96372 THER/PROPH/DIAG INJ SC/IM: CPT

## 2021-09-30 RX ADMIN — CYANOCOBALAMIN 1000 MCG: 1000 INJECTION INTRAMUSCULAR; SUBCUTANEOUS at 09:32

## 2021-09-30 NOTE — PROGRESS NOTES
Assessment/Plan:    No problem-specific Assessment & Plan notes found for this encounter  Diagnoses and all orders for this visit:    Vitamin B12 deficiency          Subjective:      Patient ID: Jamin Clark is a 41 y o  female  HPI        Review of Systems      Objective: There were no vitals taken for this visit           Physical Exam

## 2021-10-02 ENCOUNTER — IMMUNIZATIONS (OUTPATIENT)
Dept: FAMILY MEDICINE CLINIC | Facility: HOSPITAL | Age: 46
End: 2021-10-02

## 2021-10-02 DIAGNOSIS — Z23 ENCOUNTER FOR IMMUNIZATION: Primary | ICD-10-CM

## 2021-10-02 PROCEDURE — 91300 SARS-COV-2 / COVID-19 MRNA VACCINE (PFIZER-BIONTECH) 30 MCG: CPT

## 2021-10-02 PROCEDURE — 0001A SARS-COV-2 / COVID-19 MRNA VACCINE (PFIZER-BIONTECH) 30 MCG: CPT

## 2021-10-29 ENCOUNTER — CLINICAL SUPPORT (OUTPATIENT)
Dept: FAMILY MEDICINE CLINIC | Facility: CLINIC | Age: 46
End: 2021-10-29
Payer: COMMERCIAL

## 2021-10-29 DIAGNOSIS — E53.8 VITAMIN B12 DEFICIENCY: Primary | ICD-10-CM

## 2021-10-29 PROCEDURE — 96372 THER/PROPH/DIAG INJ SC/IM: CPT

## 2021-10-29 RX ADMIN — CYANOCOBALAMIN 1000 MCG: 1000 INJECTION INTRAMUSCULAR; SUBCUTANEOUS at 10:11

## 2021-11-08 ENCOUNTER — TELEPHONE (OUTPATIENT)
Dept: OBGYN CLINIC | Facility: CLINIC | Age: 46
End: 2021-11-08

## 2021-11-08 DIAGNOSIS — N92.0 MENORRHAGIA WITH REGULAR CYCLE: Primary | ICD-10-CM

## 2021-11-08 DIAGNOSIS — Z13.0 SCREENING, ANEMIA, DEFICIENCY, IRON: ICD-10-CM

## 2021-11-08 DIAGNOSIS — N92.6 IRREGULAR MENSES: Primary | ICD-10-CM

## 2021-11-08 DIAGNOSIS — E03.9 HYPOTHYROIDISM, UNSPECIFIED TYPE: ICD-10-CM

## 2021-11-08 DIAGNOSIS — N92.0 MENORRHAGIA WITH REGULAR CYCLE: ICD-10-CM

## 2021-11-09 ENCOUNTER — TELEPHONE (OUTPATIENT)
Dept: OBGYN CLINIC | Facility: CLINIC | Age: 46
End: 2021-11-09

## 2021-11-09 ENCOUNTER — HOSPITAL ENCOUNTER (OUTPATIENT)
Dept: RADIOLOGY | Facility: HOSPITAL | Age: 46
Discharge: HOME | End: 2021-11-09
Attending: PHYSICIAN ASSISTANT
Payer: COMMERCIAL

## 2021-11-09 ENCOUNTER — APPOINTMENT (OUTPATIENT)
Dept: LAB | Facility: AMBULARY SURGERY CENTER | Age: 46
End: 2021-11-09
Payer: COMMERCIAL

## 2021-11-09 DIAGNOSIS — N92.0 MENORRHAGIA WITH REGULAR CYCLE: ICD-10-CM

## 2021-11-09 DIAGNOSIS — Z13.29 SCREENING FOR THYROID DISORDER: Primary | ICD-10-CM

## 2021-11-09 DIAGNOSIS — Z13.0 SCREENING, ANEMIA, DEFICIENCY, IRON: ICD-10-CM

## 2021-11-09 DIAGNOSIS — Z13.29 SCREENING FOR THYROID DISORDER: ICD-10-CM

## 2021-11-09 LAB
B-HCG SERPL-ACNC: <2 MIU/ML
BASOPHILS # BLD AUTO: 0.08 THOUSANDS/ΜL (ref 0–0.1)
BASOPHILS NFR BLD AUTO: 1 % (ref 0–1)
EOSINOPHIL # BLD AUTO: 0.31 THOUSAND/ΜL (ref 0–0.61)
EOSINOPHIL NFR BLD AUTO: 5 % (ref 0–6)
ERYTHROCYTE [DISTWIDTH] IN BLOOD BY AUTOMATED COUNT: 13.2 % (ref 11.6–15.1)
FERRITIN SERPL-MCNC: 16 NG/ML (ref 8–388)
HCT VFR BLD AUTO: 41.1 % (ref 34.8–46.1)
HGB BLD-MCNC: 13 G/DL (ref 11.5–15.4)
IMM GRANULOCYTES # BLD AUTO: 0.02 THOUSAND/UL (ref 0–0.2)
IMM GRANULOCYTES NFR BLD AUTO: 0 % (ref 0–2)
IRON SATN MFR SERPL: 14 % (ref 15–50)
IRON SERPL-MCNC: 61 UG/DL (ref 50–170)
LYMPHOCYTES # BLD AUTO: 2.37 THOUSANDS/ΜL (ref 0.6–4.47)
LYMPHOCYTES NFR BLD AUTO: 35 % (ref 14–44)
MCH RBC QN AUTO: 30 PG (ref 26.8–34.3)
MCHC RBC AUTO-ENTMCNC: 31.6 G/DL (ref 31.4–37.4)
MCV RBC AUTO: 95 FL (ref 82–98)
MONOCYTES # BLD AUTO: 0.33 THOUSAND/ΜL (ref 0.17–1.22)
MONOCYTES NFR BLD AUTO: 5 % (ref 4–12)
NEUTROPHILS # BLD AUTO: 3.69 THOUSANDS/ΜL (ref 1.85–7.62)
NEUTS SEG NFR BLD AUTO: 54 % (ref 43–75)
NRBC BLD AUTO-RTO: 0 /100 WBCS
PLATELET # BLD AUTO: 327 THOUSANDS/UL (ref 149–390)
PMV BLD AUTO: 9.8 FL (ref 8.9–12.7)
RBC # BLD AUTO: 4.33 MILLION/UL (ref 3.81–5.12)
T4 FREE SERPL-MCNC: 1.28 NG/DL (ref 0.76–1.46)
TIBC SERPL-MCNC: 443 UG/DL (ref 250–450)
TSH SERPL DL<=0.05 MIU/L-ACNC: 2.14 UIU/ML (ref 0.36–3.74)
WBC # BLD AUTO: 6.8 THOUSAND/UL (ref 4.31–10.16)

## 2021-11-09 PROCEDURE — 84443 ASSAY THYROID STIM HORMONE: CPT

## 2021-11-09 PROCEDURE — 82728 ASSAY OF FERRITIN: CPT

## 2021-11-09 PROCEDURE — 84702 CHORIONIC GONADOTROPIN TEST: CPT

## 2021-11-09 PROCEDURE — 84439 ASSAY OF FREE THYROXINE: CPT

## 2021-11-09 PROCEDURE — 83540 ASSAY OF IRON: CPT

## 2021-11-09 PROCEDURE — 36415 COLL VENOUS BLD VENIPUNCTURE: CPT

## 2021-11-09 PROCEDURE — 76830 TRANSVAGINAL US NON-OB: CPT

## 2021-11-09 PROCEDURE — 76856 US EXAM PELVIC COMPLETE: CPT

## 2021-11-09 PROCEDURE — 85025 COMPLETE CBC W/AUTO DIFF WBC: CPT

## 2021-11-09 PROCEDURE — 83550 IRON BINDING TEST: CPT

## 2021-11-09 RX ORDER — ETHINYL ESTRADIOL/DROSPIRENONE 0.02-3(28)
1 TABLET ORAL DAILY
Qty: 90 TABLET | Refills: 2 | Status: SHIPPED | OUTPATIENT
Start: 2021-11-09 | End: 2021-12-13

## 2021-11-09 RX ORDER — LEVOTHYROXINE SODIUM 0.12 MG/1
125 TABLET ORAL DAILY
Qty: 90 TABLET | Refills: 0 | Status: SHIPPED | OUTPATIENT
Start: 2021-11-09 | End: 2022-05-13 | Stop reason: SDUPTHER

## 2021-11-11 ENCOUNTER — TELEPHONE (OUTPATIENT)
Dept: OBGYN CLINIC | Facility: CLINIC | Age: 46
End: 2021-11-11

## 2021-12-13 ENCOUNTER — OFFICE VISIT (OUTPATIENT)
Dept: OBGYN CLINIC | Facility: CLINIC | Age: 46
End: 2021-12-13

## 2021-12-13 VITALS
DIASTOLIC BLOOD PRESSURE: 80 MMHG | WEIGHT: 192 LBS | HEIGHT: 68 IN | BODY MASS INDEX: 29.1 KG/M2 | SYSTOLIC BLOOD PRESSURE: 118 MMHG | TEMPERATURE: 97 F

## 2021-12-13 DIAGNOSIS — N93.9 ABNORMAL UTERINE BLEEDING (AUB): ICD-10-CM

## 2021-12-13 DIAGNOSIS — Z30.09 STERILIZATION CONSULT: ICD-10-CM

## 2021-12-13 DIAGNOSIS — Z01.818 PREOP EXAMINATION: Primary | ICD-10-CM

## 2021-12-13 DIAGNOSIS — N84.1 CERVICAL POLYP: ICD-10-CM

## 2021-12-13 PROCEDURE — PREOP: Performed by: STUDENT IN AN ORGANIZED HEALTH CARE EDUCATION/TRAINING PROGRAM

## 2021-12-21 ENCOUNTER — TELEPHONE (OUTPATIENT)
Dept: OBGYN CLINIC | Facility: CLINIC | Age: 46
End: 2021-12-21

## 2022-01-18 ENCOUNTER — OFFICE VISIT (OUTPATIENT)
Dept: OBGYN CLINIC | Facility: HOSPITAL | Age: 47
End: 2022-01-18
Payer: COMMERCIAL

## 2022-01-18 ENCOUNTER — HOSPITAL ENCOUNTER (OUTPATIENT)
Dept: RADIOLOGY | Facility: HOSPITAL | Age: 47
Discharge: HOME | End: 2022-01-18
Payer: COMMERCIAL

## 2022-01-18 VITALS
SYSTOLIC BLOOD PRESSURE: 109 MMHG | WEIGHT: 192 LBS | HEART RATE: 96 BPM | HEIGHT: 68 IN | DIASTOLIC BLOOD PRESSURE: 79 MMHG | BODY MASS INDEX: 29.1 KG/M2

## 2022-01-18 DIAGNOSIS — M25.561 RIGHT KNEE PAIN, UNSPECIFIED CHRONICITY: Primary | ICD-10-CM

## 2022-01-18 DIAGNOSIS — M25.561 RIGHT KNEE PAIN, UNSPECIFIED CHRONICITY: ICD-10-CM

## 2022-01-18 DIAGNOSIS — M76.31 ILIOTIBIAL BAND SYNDROME OF RIGHT SIDE: ICD-10-CM

## 2022-01-18 PROCEDURE — 99203 OFFICE O/P NEW LOW 30 MIN: CPT | Performed by: PHYSICIAN ASSISTANT

## 2022-01-18 PROCEDURE — 73562 X-RAY EXAM OF KNEE 3: CPT

## 2022-01-18 NOTE — PROGRESS NOTES
Assessment:    Right ITB band syndrome      Plan:    Initiate outpatient physical therapy for treatment   Start over-the-counter ibuprofen as needed for pain  Follow-up 6 weeks for re-evaluation        Problem List Items Addressed This Visit        Other    Right knee pain - Primary    Relevant Orders    XR knee 3 vw right non injury                   Subjective:     Patient ID:  Kary Samuel is a 40 y o  female    HPI    81-year-old female presenting for evaluation of her right knee  According to the patient, she has roughly a one month history of right lateral knee pain that radiates to the posterior aspect  She states about a month ago she started to work out again and the pain was mild at first however it has gotten progressively more bothersome  Sometimes it radiates proximally in the lateral thigh region  No associated numbness and tingling  There was no trauma or injury to the area  The pain is described as sharp and sometimes shooting  No history of surgery to the right knee  The following portions of the patient's history were reviewed and updated as appropriate: allergies, current medications, past family history, past medical history, past social history, past surgical history and problem list     Review of Systems     Objective:    Imaging:  Right knee x-rays demonstrate no acute fracture or dislocation      Vitals:    01/18/22 1425   BP: 109/79   Pulse: 96           Physical Exam     Orthopedic Examination:  Right knee    Inspection:  No open wounds or erythema  There is no effusion  Palpation:  No warmth  No palpable gap quadriceps tendon  Mild tenderness to palpation right IT band  Patella MPFL nontender to palpation    Range-of-motion:  0 to 140  There is pain with full extension    There is no extensor lag    Strength:  5/5 hip flexion knee extension ankle plantar dorsiflexion    Sensation:  Intact    Special Tests:  Stable to varus and valgus stress  Negative Nicolle's  Negative anterior posterior drawer, Nicolle's   Negative patellar apprehension test

## 2022-01-18 NOTE — H&P (VIEW-ONLY)
Preoperative History & Physical      Assessment/Plan:  Dennis Lee is a 40 y o   with AUB who presents for discussion of management options     Diagnoses and all orders for this visit:    Abnormal uterine bleeding (AUB)  -     Tissue Exam  -     Endometrial biopsy    Request for sterilization        · Previously extensively reviewed risks and benefits of ongoing medical management, endometrial ablation, and hysterectomy  Patient opts for surgical management with a hysteroscopy/dilation&curettage/endometrial ablation and possible cervical polyp removal, and laparoscopic bilateral salpingectomy for permanent contraception  · Reviewed procedure in detail and risks including but not limited to inability to complete procedure if dimensions are not appropriate or if device unable to run  Reviewed risk of VTE/stroke, bleeding that may require transfusion, infection  Reviewed possibility of diagnostic laparoscopy if any concern for operative injury to uterus or surrounding structures (including bowel or bladder) and possibility of open incision if additional surgery needed  · Reviewed inability to reliably sample endometrial tissue after ablation, as such, endometrial sampling recommended prior to procedure--endometrial biopsy performed today  · Reviewed possibility of incomplete improvement in bleeding or in bleeding recurrence such that patient may desire repeat ablation or hysterectomy  · All questions answered to the best of my ability  · Consents reviewed and signed today  Also discussed undesired fertility  Desires LSC bilateral salpingectomy  Previously reviewed permanent nature  Reminded of possibility of post-tubal ablation syndrome (unlikely but possible)  Reviewed risks of scar tissue from prior abdominal surgery making [facility] BS difficult or not possible  Increased risk of injury to surrounding structures  All questions answered to the best of my ability        Subjective:      Patient ID: Ellie Serafin Ridley is a 40 y o  female  HPI  Previously seen 12/13/21 for AUB mentioned potential for permanent contraception    AUB previously managed with specific formulation of BEATRIZ  (menometrorrhagia)  When came off market trialed others with severe side effects  Not interested in any LARCs or Depo  Ready for definitive management with ablation    Saw Alanna 05/20/2021 for annual--cervical polyp visualized on exam by provider at that time, but not removed  The following portions of the patient's history were reviewed and updated as appropriate: allergies, current medications, past family history, past medical history, past social history, past surgical history and problem list     Review of Systems   Constitutional: Negative for chills and fever  HENT: Negative for ear pain and sore throat  Eyes: Negative for pain and visual disturbance  Respiratory: Negative for cough and shortness of breath  Cardiovascular: Negative for chest pain and palpitations  Gastrointestinal: Negative for abdominal pain and vomiting  Genitourinary: Negative for dysuria and hematuria  Musculoskeletal: Negative for arthralgias and back pain  Skin: Negative for color change and rash  Neurological: Negative for seizures and syncope  All other systems reviewed and are negative  Objective:  /70 (BP Location: Right arm, Patient Position: Sitting, Cuff Size: Large)   Wt 89 4 kg (197 lb)   LMP 01/03/2022 (Approximate)   BMI 29 95 kg/m²    Physical Exam  Constitutional:       Appearance: Normal appearance  HENT:      Head: Normocephalic and atraumatic  Nose: Nose normal    Eyes:      Extraocular Movements: Extraocular movements intact  Conjunctiva/sclera: Conjunctivae normal    Cardiovascular:      Rate and Rhythm: Normal rate  Pulmonary:      Effort: Pulmonary effort is normal    Abdominal:      General: Abdomen is flat  Palpations: Abdomen is soft     Genitourinary:     Comments: Vulva: normal, no lesions  Bladder: normal, no lesions or ttp  Urethra:  Cervix: no CMT, no polyp visualized  Uterus: mildly retroflexed  Adnexa: no ttp  Musculoskeletal:         General: Normal range of motion  Cervical back: Normal range of motion  Right lower leg: No edema  Left lower leg: No edema  Skin:     General: Skin is warm  Neurological:      General: No focal deficit present  Mental Status: She is alert  Psychiatric:         Mood and Affect: Mood normal          Behavior: Behavior normal          Thought Content: Thought content normal                Study Result    Narrative & Impression   PELVIC ULTRASOUND, COMPLETE     INDICATION:  41years old  N92 0: Excessive and frequent menstruation with regular cycle      COMPARISON: None     TECHNIQUE:   Transabdominal pelvic ultrasound was performed in sagittal and transverse planes with a curvilinear transducer  Additional transvaginal imaging was performed to better evaluate the endometrium and ovaries  Imaging included volumetric   sweeps as well as traditional still imaging technique      FINDINGS:     UTERUS:  The uterus is anteverted in position, measuring 9 3 x 4 1 x 4 7 cm  Contour and echotexture appear normal   The cervix shows no suspicious abnormality      ENDOMETRIUM:   Evaluation of the endometrium is limited due to position of the uterus on transvaginal images  Normal caliber of 6 mm  Homogeneous and normal in appearance      OVARIES/ADNEXA:  Right ovary is visualized transabdominally only      Right ovary:  3 1 x 1 2 x 1 9 cm  No suspicious right ovarian abnormality  Doppler flow within normal limits      Left ovary:  2 0 x 1 4 x 1 4 cm  No suspicious left ovarian abnormality  Doppler flow within normal limits      No suspicious adnexal mass or loculated collections  There is no free fluid      IMPRESSION:  Unremarkable endometrium with mildly limited evaluation due to uterine position              Endometrial biopsy    Date/Time: 1/20/2022 1:08 PM  Performed by: Vamsi Olivas MD  Authorized by: Vamsi Olivas MD   Universal Protocol:  Consent: Verbal consent obtained  Risks and benefits: risks, benefits and alternatives were discussed  Consent given by: patient  Time out: Immediately prior to procedure a "time out" was called to verify the correct patient, procedure, equipment, support staff and site/side marked as required  Patient understanding: patient states understanding of the procedure being performed  Relevant documents: relevant documents present and verified  Patient identity confirmed: verbally with patient and provided demographic data      Indication:     Indications:  Other disorder of menstruation and other abnormal bleeding from female genital tract    Procedure:     Procedure: endometrial biopsy with Pipelle      A bivalve speculum was placed in the vagina: yes      Cervix cleaned and prepped: yes      Uterus sounded: yes      Uterus sound depth (cm):  6    Specimen collected: specimen collected and sent to pathology      Patient tolerated procedure well with no complications: yes    Findings:     Uterus size:  Non-gravid    Cervix: normal      Adnexa: normal

## 2022-01-20 ENCOUNTER — OFFICE VISIT (OUTPATIENT)
Dept: FAMILY MEDICINE CLINIC | Facility: CLINIC | Age: 47
End: 2022-01-20
Payer: COMMERCIAL

## 2022-01-20 ENCOUNTER — PROCEDURE VISIT (OUTPATIENT)
Dept: OBGYN CLINIC | Facility: CLINIC | Age: 47
End: 2022-01-20
Payer: COMMERCIAL

## 2022-01-20 VITALS
HEART RATE: 78 BPM | TEMPERATURE: 98.4 F | HEIGHT: 68 IN | OXYGEN SATURATION: 99 % | RESPIRATION RATE: 14 BRPM | DIASTOLIC BLOOD PRESSURE: 70 MMHG | SYSTOLIC BLOOD PRESSURE: 120 MMHG | WEIGHT: 197.2 LBS | BODY MASS INDEX: 29.89 KG/M2

## 2022-01-20 VITALS — WEIGHT: 197 LBS | DIASTOLIC BLOOD PRESSURE: 70 MMHG | SYSTOLIC BLOOD PRESSURE: 110 MMHG | BODY MASS INDEX: 29.95 KG/M2

## 2022-01-20 DIAGNOSIS — E03.9 ACQUIRED HYPOTHYROIDISM: ICD-10-CM

## 2022-01-20 DIAGNOSIS — E55.9 VITAMIN D DEFICIENCY: ICD-10-CM

## 2022-01-20 DIAGNOSIS — Z30.2 REQUEST FOR STERILIZATION: ICD-10-CM

## 2022-01-20 DIAGNOSIS — Z13.220 ENCOUNTER FOR LIPID SCREENING FOR CARDIOVASCULAR DISEASE: ICD-10-CM

## 2022-01-20 DIAGNOSIS — N93.9 ABNORMAL UTERINE BLEEDING (AUB): ICD-10-CM

## 2022-01-20 DIAGNOSIS — Z00.00 ANNUAL PHYSICAL EXAM: Primary | ICD-10-CM

## 2022-01-20 DIAGNOSIS — E53.8 B12 DEFICIENCY: ICD-10-CM

## 2022-01-20 DIAGNOSIS — Z01.818 PREOPERATIVE EXAM FOR GYNECOLOGIC SURGERY: Primary | ICD-10-CM

## 2022-01-20 DIAGNOSIS — Z12.11 SCREENING FOR COLON CANCER: ICD-10-CM

## 2022-01-20 DIAGNOSIS — Z13.6 ENCOUNTER FOR LIPID SCREENING FOR CARDIOVASCULAR DISEASE: ICD-10-CM

## 2022-01-20 DIAGNOSIS — G43.909 MIGRAINE WITHOUT STATUS MIGRAINOSUS, NOT INTRACTABLE, UNSPECIFIED MIGRAINE TYPE: ICD-10-CM

## 2022-01-20 DIAGNOSIS — Z13.1 SCREENING FOR DIABETES MELLITUS: ICD-10-CM

## 2022-01-20 PROCEDURE — 58100 BIOPSY OF UTERUS LINING: CPT | Performed by: STUDENT IN AN ORGANIZED HEALTH CARE EDUCATION/TRAINING PROGRAM

## 2022-01-20 PROCEDURE — 96372 THER/PROPH/DIAG INJ SC/IM: CPT

## 2022-01-20 PROCEDURE — 88305 TISSUE EXAM BY PATHOLOGIST: CPT | Performed by: PATHOLOGY

## 2022-01-20 PROCEDURE — 99396 PREV VISIT EST AGE 40-64: CPT | Performed by: FAMILY MEDICINE

## 2022-01-20 PROCEDURE — PREOP: Performed by: STUDENT IN AN ORGANIZED HEALTH CARE EDUCATION/TRAINING PROGRAM

## 2022-01-20 RX ORDER — CYANOCOBALAMIN 1000 UG/ML
1000 INJECTION INTRAMUSCULAR; SUBCUTANEOUS
Status: SHIPPED | OUTPATIENT
Start: 2022-01-20

## 2022-01-20 RX ADMIN — CYANOCOBALAMIN 1000 MCG: 1000 INJECTION INTRAMUSCULAR; SUBCUTANEOUS at 16:52

## 2022-01-20 NOTE — PROGRESS NOTES
[address]    NAME: Ellie Cross  AGE: 40 y o  SEX: female  : 1975     DATE: 2022     Assessment and Plan:     Problem List Items Addressed This Visit        Endocrine    Hypothyroidism    Relevant Orders    TSH, 3rd generation with Free T4 reflex       Cardiovascular and Mediastinum    Migraine    Relevant Orders    CBC    Comprehensive metabolic panel       Other    Vitamin D deficiency    Relevant Orders    Vitamin D 25 hydroxy    Annual physical exam - Primary     covid vaccine and flu shot done         B12 deficiency     Check vit b12 today         Relevant Medications    cyanocobalamin injection 1,000 mcg    Screening for diabetes mellitus    Relevant Orders    Hemoglobin A1C    Encounter for lipid screening for cardiovascular disease    Relevant Orders    Lipid Panel with Direct LDL reflex      Other Visit Diagnoses     Screening for colon cancer        Relevant Orders    Ambulatory Referral to Colorectal Surgery          Immunizations and preventive care screenings were discussed with patient today  Appropriate education was printed on patient's after visit summary  Counseling:  · Dental Health: discussed importance of regular tooth brushing, flossing, and dental visits  BMI Counseling: Body mass index is 29 98 kg/m²  The BMI is above normal  Nutrition recommendations include encouraging healthy choices of fruits and vegetables  Exercise recommendations include exercising 3-5 times per week  No pharmacotherapy was ordered  Rationale for BMI follow-up plan is due to patient being overweight or obese  Return in 1 year (on 2023)       Chief Complaint:     Chief Complaint   Patient presents with    Annual Exam     Patient here for annual wellness exam       History of Present Illness:     Adult Annual Physical   Patient here for a comprehensive physical exam  The patient reports problems - it band issues-seeing ortho, went off birth control- having ablation  Diet and Physical Activity  · Diet/Nutrition: well balanced diet  · Exercise: 5-7 times a week on average  Depression Screening  PHQ-2/9 Depression Screening    Little interest or pleasure in doing things: 0 - not at all  Feeling down, depressed, or hopeless: 0 - not at all  Trouble falling or staying asleep, or sleeping too much: 0 - not at all  Feeling tired or having little energy: 1 - several days  Poor appetite or overeatin - not at all  Feeling bad about yourself - or that you are a failure or have let yourself or your family down: 0 - not at all  Trouble concentrating on things, such as reading the newspaper or watching television: 0 - not at all  Moving or speaking so slowly that other people could have noticed  Or the opposite - being so fidgety or restless that you have been moving around a lot more than usual: 0 - not at all  Thoughts that you would be better off dead, or of hurting yourself in some way: 0 - not at all  PHQ-9 Score: 1   PHQ-9 Interpretation: No or Minimal depression        General Health  · Sleep: sleeps well  · Hearing: normal - bilateral   · Vision: no vision problems  · Dental: regular dental visits  /GYN Health  · Patient is: premenopausal  · Last menstrual period: regular  · Contraceptive method: went off ocp  Review of Systems:     Review of Systems   Constitutional: Negative  HENT: Negative  Eyes: Negative  Respiratory: Negative  Cardiovascular: Negative  Gastrointestinal: Negative  Endocrine: Negative  Genitourinary: Positive for menstrual problem  Musculoskeletal: Positive for arthralgias (knee pain)  Allergic/Immunologic: Negative  Neurological: Negative  Hematological: Negative  Psychiatric/Behavioral: Negative         Past Medical History:     Past Medical History:   Diagnosis Date    Arthritis     Asthma     Breast lump     resolved [id number]    Depression     Disease of thyroid gland     Hair loss     Hemorrhage of conjunctiva, left     last assessed [id number]    Lyme disease     lastt assessed [id number]    Thyroid disease       Past Surgical History:     Past Surgical History:   Procedure Laterality Date    LUMBAR FUSION  01/01/2004    Lumbar Vertebral Fusion;  2004      Social History:     Social History     Socioeconomic History    Marital status:      Spouse name: None    Number of children: None    Years of education: None    Highest education level: None   Occupational History    None   Tobacco Use    Smoking status: Never Smoker    Smokeless tobacco: Never Used   Vaping Use    Vaping Use: Never used   Substance and Sexual Activity    Alcohol use: Yes     Comment: occ    Drug use: No    Sexual activity: Yes     Partners: Male     Birth control/protection: Pill   Other Topics Concern    None   Social History Narrative    Caffeine use     Social Determinants of Health     Financial Resource Strain: Not on file   Food Insecurity: Not on file   Transportation Needs: Not on file   Physical Activity: Not on file   Stress: Not on file   Social Connections: Not on file   Intimate Partner Violence: Not on file   Housing Stability: Not on file      Family History:     Family History   Problem Relation Age of Onset    Atrial fibrillation Mother     Hyperlipidemia Mother     Hypertension Mother     Supraventricular tachycardia Mother     Heart disease Mother     Diabetes Father     Hyperlipidemia Father     Hypertension Father     Arthritis Maternal Grandmother     Leukemia Maternal Grandmother     Cancer Maternal Grandmother         blood ca    Stroke Maternal Grandfather     Pancreatic cancer Maternal Grandfather 72    No Known Problems Sister     No Known Problems Paternal Grandmother     No Known Problems Paternal Grandfather     No Known Problems Sister     No Known Problems Paternal Aunt     No Known Problems Paternal Aunt     No Known Problems Paternal Aunt       Current Medications:     Current Outpatient Medications   Medication Sig Dispense Refill    albuterol (PROAIR HFA) 90 mcg/act inhaler Inhale 2 puffs every 6 (six) hours as needed for wheezing 8 5 g 0    ALPRAZolam (XANAX) 0 5 mg tablet Take 1 tablet (0 5 mg total) by mouth daily as needed for anxiety 30 tablet 0    ergocalciferol (VITAMIN D2) 50,000 units       levothyroxine 125 mcg tablet Take 1 tablet (125 mcg total) by mouth daily 90 tablet 0    valACYclovir (VALTREX) 1,000 mg tablet Take 1 tablet (1,000 mg total) by mouth 2 (two) times a day for 5 days 10 tablet 0    ZOLMitriptan (ZOMIG) 5 MG tablet Take 1 tablet (5 mg total) by mouth once as needed for migraine for up to 1 dose 6 tablet 0    Cholecalciferol (Vitamin D3) 1 25 MG (79966 UT) TABS Take 1 Tablet once a week 8 tablet 3     Current Facility-Administered Medications   Medication Dose Route Frequency Provider Last Rate Last Admin    cyanocobalamin injection 1,000 mcg  1,000 mcg Intramuscular Q30 Days Mona Coley DO   1,000 mcg at 01/20/22 1652      Allergies: Allergies   Allergen Reactions    Pollen Extract       Physical Exam:     /70 (BP Location: Left arm, Patient Position: Sitting, Cuff Size: Standard)   Pulse 78   Temp 98 4 °F (36 9 °C)   Resp 14   Ht 5' 8" (1 727 m)   Wt 89 4 kg (197 lb 3 2 oz)   LMP 01/03/2022 (Approximate)   SpO2 99%   BMI 29 98 kg/m²     Physical Exam  Vitals and nursing note reviewed  Constitutional:       Appearance: Normal appearance  She is well-developed  HENT:      Head: Normocephalic and atraumatic  Right Ear: External ear normal       Left Ear: External ear normal       Nose: Nose normal    Eyes:      Extraocular Movements: Extraocular movements intact  Conjunctiva/sclera: Conjunctivae normal       Pupils: Pupils are equal, round, and reactive to light     Cardiovascular:      Rate and Rhythm: Normal rate and regular rhythm  Heart sounds: Normal heart sounds  Pulmonary:      Effort: Pulmonary effort is normal       Breath sounds: Normal breath sounds  Abdominal:      General: Abdomen is flat  Bowel sounds are normal       Palpations: Abdomen is soft  Musculoskeletal:         General: Normal range of motion  Cervical back: Normal range of motion and neck supple  Skin:     General: Skin is warm and dry  Capillary Refill: Capillary refill takes less than 2 seconds  Neurological:      General: No focal deficit present  Mental Status: She is alert and oriented to person, place, and time  Psychiatric:         Mood and Affect: Mood normal          Behavior: Behavior normal          Thought Content:  Thought content normal          Judgment: Judgment normal           Gio Espitia DO  [address] [facility]

## 2022-01-20 NOTE — PATIENT INSTRUCTIONS

## 2022-01-26 ENCOUNTER — PREP FOR PROCEDURE (OUTPATIENT)
Dept: OBGYN CLINIC | Facility: CLINIC | Age: 47
End: 2022-01-26

## 2022-01-26 DIAGNOSIS — N84.1 CERVICAL POLYP: ICD-10-CM

## 2022-01-26 DIAGNOSIS — Z30.2 ENCOUNTER FOR STERILIZATION: ICD-10-CM

## 2022-01-26 DIAGNOSIS — N93.9 ABNORMAL UTERINE BLEEDING: Primary | ICD-10-CM

## 2022-01-27 ENCOUNTER — TELEPHONE (OUTPATIENT)
Dept: OBGYN CLINIC | Facility: CLINIC | Age: 47
End: 2022-01-27

## 2022-01-27 ENCOUNTER — EVALUATION (OUTPATIENT)
Dept: PHYSICAL THERAPY | Facility: CLINIC | Age: 47
End: 2022-01-27
Payer: COMMERCIAL

## 2022-01-27 DIAGNOSIS — M76.31 ILIOTIBIAL BAND SYNDROME OF RIGHT SIDE: Primary | ICD-10-CM

## 2022-01-27 PROCEDURE — 97110 THERAPEUTIC EXERCISES: CPT | Performed by: PHYSICAL THERAPIST

## 2022-01-27 PROCEDURE — 97161 PT EVAL LOW COMPLEX 20 MIN: CPT | Performed by: PHYSICAL THERAPIST

## 2022-01-27 NOTE — PROGRESS NOTES
PT Evaluation    Today's date: 2022   Patient name: Kelsie Vogel  : 1975  MRN: [id number]  Referring provider: Kathy Tinajero  Dx:   Encounter Diagnosis     ICD-10-CM    1  Iliotibial band syndrome of right side  M[age] Ambulatory Referral to Physical Therapy           Subjective Evaluation     History of Present Illness    Patient presents with c/o R ITB pain after moving  She likes to walk/jog and go to the gym  She had R knee pain and now it is giving out on her  She has had Spinal fusion of Lumbar spine in   She notices her kneecap will pop to the L and crunches  She is not going to the gym   Neuro signs: none  Bowel and bladder sxs: none  Red flags: none  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      Pain  At best pain ratin  At worst pain ratin  Location: Posterior lateral knee and lateral patella region  Quality: throbs, shooting  Relieving factors: ibuprofen, tiger balm  Aggravating factors: leaving leg straight for a while, walking and gives out, kneeling, down stairs, elliptical, taking off shoes    Social Support  Lives at home c her mom      Patient Goals  Patient goals for therapy: Wants to get back to the gym and walk pain free    STGs  1  Decrease pain by 20% in 2-4 weeks to improve standing tolerance  2  Improve knee ext ROM to full in 2-4 weeks to improve sit to stand  3  Improve hip strength by 1/3 grade in 2-4 weeks to improve stairs performance s pain  LTGs  1  Decrease pain by 60% in 6-8 weeks  2  Improve walking tolerance to >30 minutes in 6-8 weeks to perform community ambulation  3  Perform job/dressing/showering activities independently without pain in 6-8 weeks  4  Improve stairs tolerance to 1 flight in step through pattern in 8 weeks         Objective Measurements:    Observation:  Heel/toe walk:  Balance: unable to fully WB on RLE s pain  Gait: L toe adduction and R rearfoot pronation and eversion  Squat: Pain upon rising and clicking in knees lowered depth  Reflexes:nT   Sensation: NT  Myotomes:WFL      Slump: -R knee painful SLR: painful    Hip distraction:nt  Elys: R 95 p   DEL: tibial IR hypomobility c knee at 90 and 30 flexion  - improved   Palpation:patellar tendon ttp lateral facet     Lumbar ROM L R Strength knee L R   Flex  [facility] WFL Flex 5 5   Ext  R knee pain inconsistent 5 reps Ext 5 4p   Rot WFL WFL      SB        Hip A/PROM [facility] WFL except      Flex  /  / Flex 4+ 4+   ER at 90   ER     IR at 90  Pain in Medial knee at 25 IR     Abd   Abd 4 4-   Ext   Ext 5 5           Knee A/PROM   Ankle     Flex  120p DF 5 5   Ext -6 3p/p PF         Assessment:    Iqra Pompa is a pleasant 40 y o  female who presents with primary movement impairment diagnosis of R knee extension and tibial IR hypomobility resulting in pathoanatomical symptoms of R ITB tendonitis  She also has deficits in tibial rotation, hip abd strength, and decreased WB tolerance This is limiting  her ability to bend her knee when squatting, performing stairs, and returning to her gym program s pain  The patient's greatest concern is getting back to pain free exercise  No further referral appears necessary at this time based upon examination results  Etiologic factors include Likely her gym routine after not working out for 3 months  Negative prognostic indicators:none  Positive prognostic indicators: good attitude  Please contact me if you have any further questions or recommendations  Thank you very much for the kind referral         Plan  Patient would benefit from:Skilled physical therapy  Planned therapy interventions: manual therapy, neuromuscular re-education, stretching, strengthening, therapeutic activities, therapeutic exercise, patient education, home exercise program, and activity modification      Frequency: 2x week  Duration in weeks: 8  Treatment plan discussed with: patient               Goals: Patient's goal is to return to gym program    Precautions: none  Dx: R knee ext/tibial IR hypomobility- ITB pain      Daily Treatment Diary     Manuals 1/27/2022        abdi taping lat to med 3'        R/o trunk stab         Tibial IR mob gr 3-4 5'                 Ther Ex         treadmill         Tibial ir seated on slide board 20x        S/l clamshells         Standing hip abd R         Prone quad st when neptali                                             Neuro Re-ed         Quad set c patellar glide         SLB                                    Ther Activity                                    Gait Training                           Modalities

## 2022-01-27 NOTE — TELEPHONE ENCOUNTER
I spoke with pt yesterday and advised her that 2/16/22 was the soonest date for her to have her surgery  There was no time at [place] or [place] for her to have her surgery  Pt had no issues and is good with having her surgery at [facility]

## 2022-01-27 NOTE — TELEPHONE ENCOUNTER
----- Message from Roger Dan sent at 1/26/2022  4:46 PM EST -----  Regarding: RE: Surgery   Please review  MM CMA     ----- Message -----  From: Mahesh Sood  Sent: 1/25/2022  11:40 AM EST  To: [facility]  Subject: Surgery                                          Hi  Just checking to see if the doctor was able to get me in at [facility] for surgery sooner  I know she was going to call the  after my visit last week  If not may I please have the date for it at [address]  Its not showing up in [email or web address]  I feel like it was 2/16 or 2/17  Thank you     Ellie

## 2022-02-01 ENCOUNTER — OFFICE VISIT (OUTPATIENT)
Dept: PHYSICAL THERAPY | Facility: CLINIC | Age: 47
End: 2022-02-01
Payer: COMMERCIAL

## 2022-02-01 DIAGNOSIS — M76.31 ILIOTIBIAL BAND SYNDROME OF RIGHT SIDE: Primary | ICD-10-CM

## 2022-02-01 PROCEDURE — 97110 THERAPEUTIC EXERCISES: CPT | Performed by: PHYSICAL THERAPIST

## 2022-02-01 PROCEDURE — 97140 MANUAL THERAPY 1/> REGIONS: CPT | Performed by: PHYSICAL THERAPIST

## 2022-02-01 PROCEDURE — 97112 NEUROMUSCULAR REEDUCATION: CPT | Performed by: PHYSICAL THERAPIST

## 2022-02-01 NOTE — PROGRESS NOTES
Daily Note     Today's date: 2022  Patient name: Tony Wall  : 1975  MRN: [id number]  Referring provider: Paris Oneill  Dx: No diagnosis found  Subjective: She states she is feeling better after last session  She felt like the taping did help  Objective: See treatment diary below      Assessment: Tolerated treatment fair  Patient would benefit from continued PT       Plan: Continue per plan of care        Goals: Patient's goal is to return to gym program    Precautions: lumbar fusion  Dx: R knee ext/tibial IR hypomobility- ITB pain      Daily Treatment Diary     Manuals 2022       abdi taping lat to med 3' 3'       R/o trunk stab  ASLR slight improvement       Tibial IR mob gr 3-4 5' 5'                Ther Ex         treadmill  Bike - treadmill nv       Tibial ir seated on slide board 20x        S/l clamshells  2x20       Standing hip abd R  2x10       Prone quad st when neptali  nv       R hip abd iso  Painful in HS                                  Neuro Re-ed         Quad set c patellar glide  5x pain       SLB                                    Ther Activity         TG L 16  5 reps pain                         Gait Training                           Modalities

## 2022-02-02 NOTE — PRE-PROCEDURE INSTRUCTIONS
My Surgical Experience    The following information was developed to assist you to prepare for your operation  What do I need to do before coming to the hospital?   Arrange for a responsible person to drive you to and from the hospital    Arrange care for your children at home  Children are not allowed in the recovery areas of the hospital   Plan to wear clothing that is easy to put on and take off  If you are having shoulder surgery, wear a shirt that buttons or zippers in the front  Bathing  o Shower the evening before and the morning of your surgery with an antibacterial soap  Please refer to the Pre Op Showering Instructions for Surgery Patients Sheet   o Remove nail polish and all body piercing jewelry  o Do not shave any body part for at least 24 hours before surgery-this includes face, arms, legs and upper body  Food  o Nothing to eat or drink after midnight the night before your surgery  This includes candy and chewing gum  o Exception: If your surgery is after 12:00pm (noon), you may have clear liquids such as 7-Up®, ginger ale, apple or cranberry juice, Jell-O®, water, or clear broth until 8:00 am  o Do not drink milk or juice with pulp on the morning before surgery  o Do not drink alcohol 24 hours before surgery  Medicine  o Follow instructions you received from your surgeon about which medicines you may take on the day of surgery  o If instructed to take medicine on the morning of surgery, take pills with just a small sip of water  Call your prescribing doctor for specific infroamtion on what to do if you take insulin    What should I bring to the hospital?    Bring:  Patricia Linda or a walker, if you have them, for foot or knee surgery   A list of the daily medicines, vitamins, minerals, herbals and nutritional supplements you take   Include the dosages of medicines and the time you take them each day   Glasses, dentures or hearing aids   Minimal clothing; you will be wearing hospital sleepwear   Photo ID; required to verify your identity   If you have a Living Will or Power of , bring a copy of the documents   If you have an ostomy, bring an extra pouch and any supplies you use    Do not bring   Medicines or inhalers   Money, valuables or jewelry    What other information should I know about the day of surgery?  Notify your surgeons if you develop a cold, sore throat, cough, fever, rash or any other illness   Report to the Ambulatory Surgical/Same Day Surgery Unit   You will be instructed to stop at Registration only if you have not been pre-registered   Inform your  fi they do not stay that they will be asked by the staff to leave a phone number where they can be reached   Be available to be reached before surgery  In the event the operating room schedule changes, you may be asked to come in earlier or later than expected    *It is important to tell your doctor and others involved in your health care if you are taking or have been taking any non-prescription drugs, vitamins, minerals, herbals or other nutritional supplements  Any of these may interact with some food or medicines and cause a reaction      Pre-Surgery Instructions:   Medication Instructions    albuterol (PROAIR HFA) 90 mcg/act inhaler Instructed patient per Anesthesia Guidelines   ALPRAZolam (XANAX) 0 5 mg tablet Instructed patient per Anesthesia Guidelines   Cholecalciferol (Vitamin D3) 1 25 MG (52893 UT) TABS Instructed patient per Anesthesia Guidelines   levothyroxine 125 mcg tablet Instructed patient per Anesthesia Guidelines   ZOLMitriptan (ZOMIG) 5 MG tablet Instructed patient per Anesthesia Guidelines  To take synthroid and xanax(prn) a m  of surgery

## 2022-02-03 ENCOUNTER — APPOINTMENT (OUTPATIENT)
Dept: PHYSICAL THERAPY | Facility: CLINIC | Age: 47
End: 2022-02-03
Payer: COMMERCIAL

## 2022-02-07 ENCOUNTER — OFFICE VISIT (OUTPATIENT)
Dept: PHYSICAL THERAPY | Facility: CLINIC | Age: 47
End: 2022-02-07
Payer: COMMERCIAL

## 2022-02-07 DIAGNOSIS — M76.31 ILIOTIBIAL BAND SYNDROME OF RIGHT SIDE: Primary | ICD-10-CM

## 2022-02-07 PROCEDURE — 97110 THERAPEUTIC EXERCISES: CPT | Performed by: PHYSICAL THERAPIST

## 2022-02-07 PROCEDURE — 97140 MANUAL THERAPY 1/> REGIONS: CPT | Performed by: PHYSICAL THERAPIST

## 2022-02-07 NOTE — PROGRESS NOTES
Daily Note     Today's date: 2022  Patient name: Mahesh Sood  : 1975  MRN: [id number]  Referring provider: Celeste Nuno  Dx:   Encounter Diagnosis     ICD-10-CM    1  Iliotibial band syndrome of right side  M[age]                   Subjective: She states she is feeling better after last session  She felt like the taping did help  She wants to walk and jog  Objective: See treatment diary below      Assessment: Tolerated treatment fair  Patient would benefit from continued PT       Plan: Continue per plan of care        Goals: Patient's goal is to return to gym program    Precautions: lumbar fusion  Dx: R knee ext/tibial IR hypomobility- ITB pain      Daily Treatment Diary     Manuals 2022      abdi taping lat to med 3' 3' 3'      R/o trunk stab  ASLR slight improvement       Tibial IR mob gr 3-4 5' 5' 5'               Ther Ex         treadmill  Bike - treadmill nv 6 min      Tibial ir seated on slide board 20x        S/l clamshells  2x20 2x20      Standing hip abd R  2x10 2x10      Prone quad st when neptali  nv 3x :20      R hip abd iso  Painful in HS 10x :05      SL LP #35   20x                        Neuro Re-ed         Quad set c patellar glide  5x pain 10x       SLB   10x :05 pain                                 Ther Activity         TG L 16  5 reps pain                         Gait Training                           Modalities

## 2022-02-15 ENCOUNTER — OFFICE VISIT (OUTPATIENT)
Dept: PHYSICAL THERAPY | Facility: CLINIC | Age: 47
End: 2022-02-15
Payer: COMMERCIAL

## 2022-02-15 ENCOUNTER — ANESTHESIA EVENT (OUTPATIENT)
Dept: PERIOP | Facility: HOSPITAL | Age: 47
End: 2022-02-15
Payer: COMMERCIAL

## 2022-02-15 DIAGNOSIS — M76.31 ILIOTIBIAL BAND SYNDROME OF RIGHT SIDE: Primary | ICD-10-CM

## 2022-02-15 PROCEDURE — 97112 NEUROMUSCULAR REEDUCATION: CPT | Performed by: PHYSICAL THERAPIST

## 2022-02-15 PROCEDURE — 97140 MANUAL THERAPY 1/> REGIONS: CPT | Performed by: PHYSICAL THERAPIST

## 2022-02-15 PROCEDURE — 97110 THERAPEUTIC EXERCISES: CPT | Performed by: PHYSICAL THERAPIST

## 2022-02-15 NOTE — ANESTHESIA PREPROCEDURE EVALUATION
Procedure:  DILATATION AND CURETTAGE (D&C) WITH HYSTEROSCOPY, POLYPECTOMY (N/A Uterus)  ABLATION ENDOMETRIAL FREDDY (N/A Uterus)  SALPINGECTOMY, LAPAROSCOPIC (Bilateral Abdomen)    Relevant Problems   CARDIO   (+) Migraine      ENDO   (+) Hypothyroidism      MUSCULOSKELETAL   (+) Fibromyalgia   (+) Primary osteoarthritis of left knee      NEURO/PSYCH   (+) Fibromyalgia   (+) Generalized anxiety disorder   (+) Major depressive disorder with single episode, in full remission (HCC)   (+) Migraine      PULMONARY   (+) Mild intermittent asthma without complication        Physical Exam    Airway    Mallampati score: II  TM Distance: >3 FB  Neck ROM: full     Dental       Cardiovascular      Pulmonary      Other Findings        Anesthesia Plan  ASA Score- 2     Anesthesia Type- general with ASA Monitors  Additional Monitors:   Airway Plan: ETT  Plan Factors-Exercise tolerance (METS): >4 METS  Chart reviewed  Imaging results reviewed  Existing labs reviewed  Patient summary reviewed  Patient is not a current smoker  Induction- intravenous  Postoperative Plan- Plan for postoperative opioid use  Informed Consent- Anesthetic plan and risks discussed with patient  I personally reviewed this patient with the CRNA  Discussed and agreed on the Anesthesia Plan with the CRNA  Merritt Bailey

## 2022-02-15 NOTE — PROGRESS NOTES
Daily Note     Today's date: 2/15/2022  Patient name: Bogdan Pineda  : 1975  MRN: [id number]  Referring provider: Juanito Spencer  Dx:   Encounter Diagnosis     ICD-10-CM    1  Iliotibial band syndrome of right side  M[age]                   Subjective: She states she went to the gym 2x last week and had soreness but the second day felt better  She was in  and she could barely move her knee  Objective: See treatment diary below    Slump: -R knee painful SLR: painful    Hip distraction:nt  Elys: R 95 p   DEL: tibial IR hypomobility c knee at 90 and 30 flexion  - improved   Palpation:patellar tendon ttp lateral facet     Lumbar ROM L R Strength knee L R   Flex  [facility] WFL Flex 5 5   Ext  R knee pain inconsistent 5 reps Ext 5 4p   Rot WFL WFL      SB        Hip A/PROM [facility] WFL except      Flex  /  / Flex 4+ 4+   ER at 90   ER     IR at 90  Pain in Medial knee at 25 IR     Abd   Abd 4 4-   Ext   Ext 5 5           Knee A/PROM   Ankle     Flex  85p DF 5 5   Ext -6 3p/p PF         Assessment: Tolerated treatment fair  She did have significantly decreased R knee flexion ROM  This was not made better c mobs and taping today  She was taught how to perform taping at home  Will progress c ROM prn  She was advised to use CP to help with pain and soreness as she also has medial knee pain  Patient would benefit from continued PT         Plan: Continue per plan of care  Will Re at nv       Goals: Patient's goal is to return to gym program    Precautions: lumbar fusion  Dx: R knee ext/tibial IR hypomobility- ITB pain      Daily Treatment Diary     Manuals 2022 2/1 2/7 2/15     abdi taping lat to med 3' 3' 3'      R/o trunk stab  ASLR slight improvement       Tibial IR mob gr 3-4 5' 5' 5' 5'              Ther Ex         treadmill  Bike - treadmill nv 6 min nv     Tibial ir seated on slide board 20x   20x     S/l clamshells  2x20 2x20 2x20     Standing hip abd R  2x10 2x10      Prone quad st when neptali  nv 3x :20 3x :20     R hip abd iso  Painful in HS 10x :05      SL LP #35   20x                        Neuro Re-ed         Quad set c patellar glide  5x pain 10x       SLB   10x :05 pain                                 Ther Activity         TG L 16  5 reps pain                         Gait Training                           Modalities

## 2022-02-16 ENCOUNTER — ANESTHESIA (OUTPATIENT)
Dept: PERIOP | Facility: HOSPITAL | Age: 47
End: 2022-02-16
Payer: COMMERCIAL

## 2022-02-16 ENCOUNTER — HOSPITAL ENCOUNTER (OUTPATIENT)
Facility: HOSPITAL | Age: 47
Setting detail: OUTPATIENT SURGERY
Discharge: HOME | End: 2022-02-16
Attending: STUDENT IN AN ORGANIZED HEALTH CARE EDUCATION/TRAINING PROGRAM | Admitting: STUDENT IN AN ORGANIZED HEALTH CARE EDUCATION/TRAINING PROGRAM
Payer: COMMERCIAL

## 2022-02-16 VITALS
TEMPERATURE: 97.7 F | DIASTOLIC BLOOD PRESSURE: 76 MMHG | RESPIRATION RATE: 18 BRPM | SYSTOLIC BLOOD PRESSURE: 128 MMHG | WEIGHT: 197.8 LBS | OXYGEN SATURATION: 99 % | HEART RATE: 81 BPM | BODY MASS INDEX: 30.08 KG/M2

## 2022-02-16 DIAGNOSIS — N93.9 ABNORMAL UTERINE BLEEDING: ICD-10-CM

## 2022-02-16 DIAGNOSIS — Z30.2 ENCOUNTER FOR STERILIZATION: Primary | ICD-10-CM

## 2022-02-16 DIAGNOSIS — N84.1 CERVICAL POLYP: ICD-10-CM

## 2022-02-16 LAB
EXT PREGNANCY TEST URINE: NEGATIVE
EXT. CONTROL: NORMAL

## 2022-02-16 PROCEDURE — 88302 TISSUE EXAM BY PATHOLOGIST: CPT | Performed by: PATHOLOGY

## 2022-02-16 PROCEDURE — 88305 TISSUE EXAM BY PATHOLOGIST: CPT | Performed by: PATHOLOGY

## 2022-02-16 PROCEDURE — 81025 URINE PREGNANCY TEST: CPT | Performed by: ANESTHESIOLOGY

## 2022-02-16 PROCEDURE — 58661 LAPAROSCOPY REMOVE ADNEXA: CPT | Performed by: OBSTETRICS & GYNECOLOGY

## 2022-02-16 PROCEDURE — 58563 HYSTEROSCOPY ABLATION: CPT | Performed by: STUDENT IN AN ORGANIZED HEALTH CARE EDUCATION/TRAINING PROGRAM

## 2022-02-16 PROCEDURE — 58661 LAPAROSCOPY REMOVE ADNEXA: CPT | Performed by: STUDENT IN AN ORGANIZED HEALTH CARE EDUCATION/TRAINING PROGRAM

## 2022-02-16 PROCEDURE — 58563 HYSTEROSCOPY ABLATION: CPT | Performed by: OBSTETRICS & GYNECOLOGY

## 2022-02-16 RX ORDER — IBUPROFEN 600 MG/1
600 TABLET ORAL EVERY 6 HOURS PRN
Qty: 30 TABLET | Refills: 0 | Status: SHIPPED | OUTPATIENT
Start: 2022-02-16

## 2022-02-16 RX ORDER — OXYCODONE HYDROCHLORIDE AND ACETAMINOPHEN 5; 325 MG/1; MG/1
1 TABLET ORAL ONCE AS NEEDED
Status: DISCONTINUED | OUTPATIENT
Start: 2022-02-16 | End: 2022-02-16 | Stop reason: HOSPADM

## 2022-02-16 RX ORDER — BUPIVACAINE HYDROCHLORIDE 2.5 MG/ML
INJECTION, SOLUTION EPIDURAL; INFILTRATION; INTRACAUDAL AS NEEDED
Status: DISCONTINUED | OUTPATIENT
Start: 2022-02-16 | End: 2022-02-16 | Stop reason: HOSPADM

## 2022-02-16 RX ORDER — MIDAZOLAM HYDROCHLORIDE 2 MG/2ML
INJECTION, SOLUTION INTRAMUSCULAR; INTRAVENOUS AS NEEDED
Status: DISCONTINUED | OUTPATIENT
Start: 2022-02-16 | End: 2022-02-16

## 2022-02-16 RX ORDER — ACETAMINOPHEN 500 MG
1000 TABLET ORAL EVERY 6 HOURS PRN
Qty: 30 TABLET | Refills: 0 | Status: SHIPPED | OUTPATIENT
Start: 2022-02-16

## 2022-02-16 RX ORDER — SODIUM CHLORIDE, SODIUM LACTATE, POTASSIUM CHLORIDE, CALCIUM CHLORIDE 600; 310; 30; 20 MG/100ML; MG/100ML; MG/100ML; MG/100ML
125 INJECTION, SOLUTION INTRAVENOUS CONTINUOUS
Status: DISCONTINUED | OUTPATIENT
Start: 2022-02-16 | End: 2022-02-16 | Stop reason: HOSPADM

## 2022-02-16 RX ORDER — HYDROMORPHONE HCL/PF 1 MG/ML
SYRINGE (ML) INJECTION AS NEEDED
Status: DISCONTINUED | OUTPATIENT
Start: 2022-02-16 | End: 2022-02-16

## 2022-02-16 RX ORDER — PROMETHAZINE HYDROCHLORIDE 25 MG/ML
25 INJECTION, SOLUTION INTRAMUSCULAR; INTRAVENOUS ONCE AS NEEDED
Status: DISCONTINUED | OUTPATIENT
Start: 2022-02-16 | End: 2022-02-16 | Stop reason: HOSPADM

## 2022-02-16 RX ORDER — FENTANYL CITRATE 50 UG/ML
INJECTION, SOLUTION INTRAMUSCULAR; INTRAVENOUS AS NEEDED
Status: DISCONTINUED | OUTPATIENT
Start: 2022-02-16 | End: 2022-02-16

## 2022-02-16 RX ORDER — DEXAMETHASONE SODIUM PHOSPHATE 4 MG/ML
INJECTION, SOLUTION INTRA-ARTICULAR; INTRALESIONAL; INTRAMUSCULAR; INTRAVENOUS; SOFT TISSUE AS NEEDED
Status: DISCONTINUED | OUTPATIENT
Start: 2022-02-16 | End: 2022-02-16

## 2022-02-16 RX ORDER — OXYCODONE HYDROCHLORIDE 5 MG/1
5 TABLET ORAL EVERY 6 HOURS PRN
Qty: 10 TABLET | Refills: 0 | Status: SHIPPED | OUTPATIENT
Start: 2022-02-16 | End: 2022-02-26

## 2022-02-16 RX ORDER — ONDANSETRON 2 MG/ML
4 INJECTION INTRAMUSCULAR; INTRAVENOUS ONCE AS NEEDED
Status: DISCONTINUED | OUTPATIENT
Start: 2022-02-16 | End: 2022-02-16 | Stop reason: HOSPADM

## 2022-02-16 RX ORDER — PROPOFOL 10 MG/ML
INJECTION, EMULSION INTRAVENOUS AS NEEDED
Status: DISCONTINUED | OUTPATIENT
Start: 2022-02-16 | End: 2022-02-16

## 2022-02-16 RX ORDER — LIDOCAINE HYDROCHLORIDE 10 MG/ML
INJECTION, SOLUTION EPIDURAL; INFILTRATION; INTRACAUDAL; PERINEURAL AS NEEDED
Status: DISCONTINUED | OUTPATIENT
Start: 2022-02-16 | End: 2022-02-16

## 2022-02-16 RX ORDER — KETOROLAC TROMETHAMINE 30 MG/ML
INJECTION, SOLUTION INTRAMUSCULAR; INTRAVENOUS AS NEEDED
Status: DISCONTINUED | OUTPATIENT
Start: 2022-02-16 | End: 2022-02-16

## 2022-02-16 RX ORDER — GLYCOPYRROLATE 0.2 MG/ML
INJECTION INTRAMUSCULAR; INTRAVENOUS AS NEEDED
Status: DISCONTINUED | OUTPATIENT
Start: 2022-02-16 | End: 2022-02-16

## 2022-02-16 RX ORDER — HYDROMORPHONE HCL/PF 1 MG/ML
0.5 SYRINGE (ML) INJECTION
Status: DISCONTINUED | OUTPATIENT
Start: 2022-02-16 | End: 2022-02-16 | Stop reason: HOSPADM

## 2022-02-16 RX ORDER — ONDANSETRON 2 MG/ML
INJECTION INTRAMUSCULAR; INTRAVENOUS AS NEEDED
Status: DISCONTINUED | OUTPATIENT
Start: 2022-02-16 | End: 2022-02-16

## 2022-02-16 RX ORDER — DIPHENHYDRAMINE HYDROCHLORIDE 50 MG/ML
12.5 INJECTION INTRAMUSCULAR; INTRAVENOUS ONCE AS NEEDED
Status: DISCONTINUED | OUTPATIENT
Start: 2022-02-16 | End: 2022-02-16 | Stop reason: HOSPADM

## 2022-02-16 RX ORDER — FENTANYL CITRATE/PF 50 MCG/ML
50 SYRINGE (ML) INJECTION
Status: DISCONTINUED | OUTPATIENT
Start: 2022-02-16 | End: 2022-02-16 | Stop reason: HOSPADM

## 2022-02-16 RX ORDER — MAGNESIUM HYDROXIDE 1200 MG/15ML
LIQUID ORAL AS NEEDED
Status: DISCONTINUED | OUTPATIENT
Start: 2022-02-16 | End: 2022-02-16 | Stop reason: HOSPADM

## 2022-02-16 RX ADMIN — LIDOCAINE HYDROCHLORIDE 5 ML: 10 INJECTION, SOLUTION EPIDURAL; INFILTRATION; INTRACAUDAL; PERINEURAL at 08:25

## 2022-02-16 RX ADMIN — MIDAZOLAM 2 MG: 1 INJECTION INTRAMUSCULAR; INTRAVENOUS at 08:20

## 2022-02-16 RX ADMIN — SODIUM CHLORIDE, SODIUM LACTATE, POTASSIUM CHLORIDE, AND CALCIUM CHLORIDE: .6; .31; .03; .02 INJECTION, SOLUTION INTRAVENOUS at 08:20

## 2022-02-16 RX ADMIN — KETOROLAC TROMETHAMINE 30 MG: 30 INJECTION, SOLUTION INTRAMUSCULAR at 09:14

## 2022-02-16 RX ADMIN — FENTANYL CITRATE 50 MCG: 50 INJECTION INTRAMUSCULAR; INTRAVENOUS at 10:09

## 2022-02-16 RX ADMIN — HYDROMORPHONE HYDROCHLORIDE 1 MG: 1 INJECTION, SOLUTION INTRAMUSCULAR; INTRAVENOUS; SUBCUTANEOUS at 09:20

## 2022-02-16 RX ADMIN — ONDANSETRON 4 MG: 2 INJECTION INTRAMUSCULAR; INTRAVENOUS at 09:14

## 2022-02-16 RX ADMIN — GLYCOPYRROLATE 0.1 MG: 0.2 INJECTION, SOLUTION INTRAMUSCULAR; INTRAVENOUS at 08:44

## 2022-02-16 RX ADMIN — DEXAMETHASONE SODIUM PHOSPHATE 8 MG: 4 INJECTION, SOLUTION INTRA-ARTICULAR; INTRALESIONAL; INTRAMUSCULAR; INTRAVENOUS; SOFT TISSUE at 08:35

## 2022-02-16 RX ADMIN — PROPOFOL 200 MG: 10 INJECTION, EMULSION INTRAVENOUS at 08:25

## 2022-02-16 RX ADMIN — PROPOFOL 100 MG: 10 INJECTION, EMULSION INTRAVENOUS at 08:26

## 2022-02-16 RX ADMIN — FENTANYL CITRATE 100 MCG: 50 INJECTION, SOLUTION INTRAMUSCULAR; INTRAVENOUS at 08:33

## 2022-02-16 NOTE — ANESTHESIA POSTPROCEDURE EVALUATION
Post-Op Assessment Note    CV Status:  Stable       Mental Status:  Sleepy   Hydration Status:  Stable   PONV Controlled:  Controlled   Airway Patency:  Patent      Post Op Vitals Reviewed: Yes      Staff: CRNA         No complications documented      BP      Temp      Pulse     Resp      SpO2

## 2022-02-16 NOTE — OP NOTE
OPERATIVE REPORT: laparoscopic bilateral salpingectomy, hysteroscopy, D&C, and endometrial ablation    PATIENT NAME: Prem Murcia    :  1975  MRN: [id number]  Pt Location: [facility] OR ROOM 03    SURGERY DATE: 2022    Surgeon(s) and Role:     * Nichelle Saini MD - Primary     * Piedad Garnica MD - Assisting (No additional resident available for assistance with laparoscopic portion of procedure at this time )    Preop Diagnosis:  Abnormal uterine bleeding [N93 9]  Cervical polyp [N84 1]  Encounter for sterilization [Z30 2]    Post-Op Diagnosis Codes:     * Abnormal uterine bleeding [N93 9]     * Encounter for sterilization [Z30 2]    Procedure(s) (LRB):  SALPINGECTOMY, LAPAROSCOPIC (Bilateral)  ABLATION ENDOMETRIAL FREDDY (N/A)  DILATATION AND CURETTAGE (D&C) WITH HYSTEROSCOP (N/A)    Specimen(s):  ID Type Source Tests Collected by Time Destination   1 : endometrial currettings Tissue Endometrium TISSUE Radha Haskins MD 2022  9:01 AM    2 : left fallopian tube Tissue Fallopian Tube, Left TISSUE EXAM Nichelle Saini MD 2022  9:33 AM    3 : right fallopian tube Tissue Fallopian Tube, Right TISSUE EXAM Nichelle Saini MD 2022  9:37 AM        Estimated Blood Loss:   Minimal    Drains:  [REMOVED] Urethral Catheter Straight-tip 16 Fr  (Removed)   Number of days: 0       Anesthesia Type:   General    Operative Indications:  Abnormal uterine bleeding [N93 9]  Cervical polyp [N84 1]  Encounter for sterilization [Z30 2]    Operative Findings:  Normal external genitalia, vagina, and cervix  No cervical polyp appreciated  Uterine cavity normal with bilateral tubal ostia visualized and patent  Intra-abdominally, normal uterus, bilateral tubes and ovaries  No intra-abdominal adhesions visualized  Complications:   None    Brief History  All risks, benefits, and alternatives to the procedure were reviewed with the patient and she had the opportunity to ask questions    The risk of regret of procedure was also addressed with the patient and options for LARC was discussed  Patient expressed desire to continue with tubal sterilization  Informed consent was obtained  Description of Procedure  Patient was taken to the operating room were a time out was performed to confirm correct patient and correct procedure  General LMA anesthesia (LMA) was administered and the patient was positioned on the OR table in the dorsal lithotomy position  All pressure points were padded and a lucas hugger was placed to maintain control of core body temperature  A bimanual exam was performed and the uterus was noted to be anteverted, normal in size and consistency with no palpable adnexal masses or fullness  The patient was prepped and draped in the usual sterile fashion with chloroprep on the abdomen and chlorhexidine prep on the vagina and perineum  Operative Technique    Procedure and Technique:    A straight catheter was introduced into the bladder, which was drained of 200 cc of clear yellow urine  Hickman and Annie retractors were placed in the vagina in order to visualize the cervix  An Allis clamp was used to grasp the anterior lip of the cervix  The uterus sounded to 9 cm  The hysteroscope was inserted and confirmed bilaterally patent tubal ostia and normal-appearing cavity  The hysteroscope was removed, and the cervix sounded to 4 cm using the Karol Hegar dilator, as such the uterine cavity length determined to be 5 cm  A Temens curette was then used to gently curettage the entire uterine cavity, and the endometrial curettings sent to pathology for evaluation  The Karol Ablation device was inserted through the cervix and advanced to the uterine fundus  Device was deployed and rotated in all directions to maximize expansion of the internal portion of the device  The intracervical balloon was insufflated   A test of the system was performed and the uterine cavity to ensure cavity integrity and proper placement of the device  Cavity integrity confirm and no leakage was appreciated  After successful testing, the Karol system was activated and ablation of the endometrium in approximately 120 seconds  The intracervical balloon we released, the internal portion of the Kaorl system collapsed, and the system removed from the uterine cavity  A sponge stick was placed in the vagina to provide uterine manipulation during the laparoscopic portion of the procedure  Sterile gloves were then exchanged and attention was turned to the abdomen  A 5-mm curvilinear incision was made at the inferior edge of the umbilicus for introduction of a 5 mm trocar  Trocar was introduced under direct visualization  Pneumoperitoneum was then established to a maximum of 15mmHg  The entire abdomen and pelvis was inspected and there was no evidence of injury to bowel, bladder, vasculature, or other structures  Attention was then turned to the pelvis  Two additional 5-mm trocar was inserted in the right and left lower quadrants under direct visualization taking care to avoid vessels of the anterior abdominal wall as well as any intra-abdominal viscera  Patient was placed in Trendelenburg and the uterus was elevated to visualize the fallopian tubes  There was noted to be grossly normal tubes and ovaries bilaterally  The left fallopian tube and mesosalpinx were grasped with Enseal XI forceps and cauterized  This was done starting at the fimbriated end working towards the uterus until 1cm from the cornua  The contralateral tube was identified and  in a similar fashion  Adequate hemostasis was confirmed following this procedure  Pneumoperitoneum was allowed to escape  Adequate hemostasis was visualized  The lateral trocars were removed under direct visualization  The laparoscope was withdrawn from the abdomen, followed by its trocar sleeve at the umbilicus   Skin incisions were closed with 4-0 monocryl and covered with skin glue  Attention was turned to the vagina  The sponge stick was removed from the vagina  At the conclusion of the procedure, all needle, sponge, and instrument counts were noted to be correct x2  Patient tolerated the procedure well and was transferred to PACU in stable condition prior to discharge with follow up in 1-2 weeks         I was present for the entire procedure    Patient Disposition:  PACU       SIGNATURE: Lilly Garcia MD  DATE: February 17, 2022  TIME: 12:49 PM

## 2022-02-16 NOTE — INTERIM OP NOTE
DILATATION AND CURETTAGE (D&C) WITH HYSTEROSCOPY,  ABLATION ENDOMETRIAL FREDDY, SALPINGECTOMY, LAPAROSCOPIC  Postoperative Note  PATIENT NAME: Ellie Cross  : 1975  MRN: [id number]  [facility] ROOM 03    Surgery Date: 2022    Preop Diagnosis:  Abnormal uterine bleeding [N93 9]  Cervical polyp [N84 1]  Encounter for sterilization [Z30 2]    Post-Op Diagnosis Codes:     * Abnormal uterine bleeding [N93 9]     * Cervical polyp [N84 1]     * Encounter for sterilization [Z30 2]    Procedure(s) (LRB):  DILATATION AND CURETTAGE (D&C) WITH HYSTEROSCOP (N/A)  ABLATION ENDOMETRIAL FREDDY (N/A)  SALPINGECTOMY, LAPAROSCOPIC (Bilateral)    Surgeon(s) and Role:     * Roxi Bowie MD - Primary     * Jerry Flood MD - Assisting    Specimens:  ID Type Source Tests Collected by Time Destination   1 : endometrial currettings Tissue Endometrium TISSUE EXAM Roxi Bowie MD 2022 0901    2 : left fallopian tube Tissue Fallopian Tube, Left TISSUE EXAM Roxi Boiwe MD 2022 0933    3 : right fallopian tube Tissue Fallopian Tube, Right TISSUE EXAM Roxi Bowie MD 2022 4613        Estimated Blood Loss:   Minimal    Anesthesia Type:   General     Findings:   NORMAL APPEARING UTERINE CAVITY, BILATERAL TUBAL OSTIA VISUALIZED  NORMAL APPEARING UTERUS, TUBES AND OVARIES      Complications:   None    SIGNATURE: Roxi Bowie MD   DATE: 2022   TIME: 9:52 AM

## 2022-02-16 NOTE — INTERVAL H&P NOTE
H&P reviewed  After examining the patient I find no changes in the patients condition since the H&P had been written      Vitals:    02/16/22 0720   BP: 136/84   Pulse: 79   Resp: 18   Temp: 97 7 °F (36 5 °C)   SpO2: 100%       Melissa Khan MD   02/16/22   8:18 AM

## 2022-02-16 NOTE — DISCHARGE INSTRUCTIONS
Hysteroscopy & Laparoscopy  WHAT YOU SHOULD KNOW:   A hysteroscopy is a procedure to look at the inside of your uterus  Other procedures may also be done during the hysteroscopy  AFTER YOU LEAVE:   Medicines:   · Acetaminophen  decreases pain  It is available without a doctor's order  Ask how much to take and how often to take it  Follow directions  Acetaminophen can cause liver damage if not taken correctly  · NSAIDs  help decrease swelling and pain  This medicine is available with or without a doctor's order  NSAIDs can cause stomach bleeding or kidney problems in certain people  If you take blood thinner medicine, always ask your primary healthcare provider (PHP) if NSAIDs are safe for you  Always read the medicine label and follow directions  · You also have oxycodone prescribed for severe breakthrough pain from your laparoscopic surgery  Please use sparingly (mostly before bedtime and in the morning) but can use as often as every 6 hours if needed for severe pain  · Take your medicine as directed  Contact your PHP if you think your medicine is not helping or if you have side effects  Tell him if you are allergic to any medicine  Keep a list of the medicines, vitamins, and herbs you take  Include the amounts, and when and why you take them  Bring the list or the pill bottles to follow-up visits  Carry your medicine list with you in case of an emergency  Follow up with your PHP or gynecologist as directed:  Write down your questions so you remember to ask them during your visits  Activity guidelines: You may feel like resting more after the procedure  Slowly start to do more each day  Rest when you feel it is needed  Ask your PHP when you can return to your daily activities  Self-care:   · Do not put anything into your vagina until your PHP says it is okay  Do not have sex, douche, or use tampons  · You may need to continue to wear a sanitary pad for up to a week   You may have light bleeding or spotting  Contact your PHP if:   · You have a fever  · You have to change your sanitary pad every hour  · You have chills, a cough, or feel weak and achy  · You have abdominal pain that does not go away  · You have abnormal or foul-smelling vaginal discharge  · Your skin is itchy, swollen, or you have a rash  · You have questions or concerns about your condition or care  © 2014 [address] is for End User's use only and may not be sold, redistributed or otherwise used for commercial purposes  All illustrations and images included in CareNotes® are the copyrighted property of A D A M [employer] Inc  or Sergio Alaniz  The above information is an  only  It is not intended as medical advice for individual conditions or treatments  Talk to your doctor, nurse or pharmacist before following any medical regimen to see if it is safe and effective for you

## 2022-02-16 NOTE — PERIOPERATIVE NURSING NOTE
Patient received back to [facility] rm 4  Patient alert and oriented  Tolerating liquids  Incisions dry and intact  Call bell within reach  Will continue to monitor

## 2022-02-17 ENCOUNTER — TELEPHONE (OUTPATIENT)
Dept: OBGYN CLINIC | Facility: CLINIC | Age: 47
End: 2022-02-17

## 2022-02-17 NOTE — TELEPHONE ENCOUNTER
S/p [facility] BS, and [address] D&C, endometrial ablation on 2/17/22 for AUB and undesired fertility    Called to check-in    Pain is minimal (not taking oxycodone)  Mild vaginal spotting yesterday none today    No fevers, chills  Urinating without issues  No BMs yet  Ate normally this AM    Pathology pending    RTO for post-op check    Reviewed precautions for calling or presenting for eval--severe pain unrelieved by tylenol/motrin, heavy bleeding, fevers, etc   All questions answered to the best of my ability      Jose L Mackey MD   02/17/22   12:07 PM

## 2022-02-21 NOTE — PROGRESS NOTES
Chief Complaint:  Right knee pain    HPI:    Jer Perez is a 40year old Female who presents today for new evaluation and treatment of right knee pain  She was last seen by Joaquin Nieves PA-C on 01/18/2022 for 1 month history of atraumatic lateral right knee pain after starting to work out  Athlete: No    Description of symptoms:  Patient reports ongoing knee pain  She does not recall any specific injury  She had some relief for about a week after starting physical therapy  Pain returned several days thereafter, localizes pain to her medial knee and lateral knee  She states medial knee pain started after physical therapy, prior to PT she only had lateral knee pain  She states pain is worse with walking up stairs and getting out of a car  She has had no new injury  She denies any numbness and tingling going down to her feet  Summary of treatment to-date:  Physical Therapy    I have personally reviewed pertinent films in PACS and my interpretation is X-ray of right knee shows no acute osseous abnormality  Mild Patellofemoral arthritis      Patient Active Problem List   Diagnosis    Allergic rhinitis    Atypical glandular cell changes cervix of undetermined significance favor dysplasia    Major depressive disorder with single episode, in full remission ([facility] [address] )    Deviated nasal septum    Fibromyalgia    Herpes labialis    Hypothyroidism    Iron deficiency    Migraine    Obesity    Primary osteoarthritis of left knee    Vitamin B12 deficiency    Vitamin D deficiency    Cervical polyp    Mild intermittent asthma without complication    Gynecologic exam normal    Routine gynecological examination    Generalized anxiety disorder    Right knee pain    Annual physical exam    B12 deficiency    Screening for diabetes mellitus    Encounter for lipid screening for cardiovascular disease        Current Outpatient Medications on File Prior to Visit   Medication Sig Dispense Refill    acetaminophen (TYLENOL) 500 mg tablet Take 2 tablets (1,000 mg total) by mouth every 6 (six) hours as needed for mild pain or moderate pain 30 tablet 0    albuterol (PROAIR HFA) 90 mcg/act inhaler Inhale 2 puffs every 6 (six) hours as needed for wheezing 8 5 g 0    ALPRAZolam (XANAX) 0 5 mg tablet Take 1 tablet (0 5 mg total) by mouth daily as needed for anxiety 30 tablet 0    Cholecalciferol (Vitamin D3) 1 25 MG (24812 UT) TABS Take 1 Tablet once a week 8 tablet 3    ibuprofen (MOTRIN) 600 mg tablet Take 1 tablet (600 mg total) by mouth every 6 (six) hours as needed for mild pain or moderate pain 30 tablet 0    levothyroxine 125 mcg tablet Take 1 tablet (125 mcg total) by mouth daily 90 tablet 0    oxyCODONE (Roxicodone) 5 immediate release tablet Take 1 tablet (5 mg total) by mouth every 6 (six) hours as needed for severe pain for up to 10 days Max Daily Amount: 20 mg 10 tablet 0    ZOLMitriptan (ZOMIG) 5 MG tablet Take 1 tablet (5 mg total) by mouth once as needed for migraine for up to 1 dose 6 tablet 0    ergocalciferol (VITAMIN D2) 50,000 units  (Patient not taking: Reported on 2/2/2022 )      valACYclovir (VALTREX) 1,000 mg tablet Take 1 tablet (1,000 mg total) by mouth 2 (two) times a day for 5 days 10 tablet 0     Current Facility-Administered Medications on File Prior to Visit   Medication Dose Route Frequency Provider Last Rate Last Admin    cyanocobalamin injection 1,000 mcg  1,000 mcg Intramuscular Q30 Days Shirley Morse, DO   1,000 mcg at 01/20/22 1652        Allergies   Allergen Reactions    Pollen Extract         Tobacco Use: Low Risk     Smoking Tobacco Use: Never Smoker    Smokeless Tobacco Use: Never Used        Social Determinants of Health     Tobacco Use: Low Risk     Smoking Tobacco Use: Never Smoker    Smokeless Tobacco Use: Never Used   Alcohol Use: Not At Risk    Frequency of Alcohol Consumption: Monthly or less    Average Number of Drinks: 1 or 2    Frequency of Binge Drinking: Never   Financial Resource Strain: Not on file   Food Insecurity: Not on file   Transportation Needs: Not on file   Physical Activity: Not on file   Stress: Not on file   Social Connections: Not on file   Intimate Partner Violence: Not on file   Depression: Not on file   Housing Stability: Not on file               Review of Systems   Constitutional: Negative for chills and fever  HENT: Negative for ear pain and sore throat  Eyes: Negative for pain and visual disturbance  Respiratory: Negative for cough and shortness of breath  Cardiovascular: Negative for chest pain and palpitations  Gastrointestinal: Negative for abdominal pain and vomiting  Genitourinary: Negative for dysuria and hematuria  Musculoskeletal: Negative for arthralgias and back pain  Skin: Negative for color change and rash  Neurological: Negative for seizures and syncope  All other systems reviewed and are negative  Body mass index is 29 39 kg/m²  Physical Exam  Vitals and nursing note reviewed  Constitutional:       General: She is not in acute distress  Appearance: She is well-developed  HENT:      Head: Normocephalic and atraumatic  Eyes:      Conjunctiva/sclera: Conjunctivae normal    Cardiovascular:      Rate and Rhythm: Normal rate and regular rhythm  Heart sounds: No murmur heard  Pulmonary:      Effort: Pulmonary effort is normal  No respiratory distress  Breath sounds: Normal breath sounds  Abdominal:      Palpations: Abdomen is soft  Tenderness: There is no abdominal tenderness  Musculoskeletal:      Cervical back: Neck supple  Skin:     General: Skin is warm and dry  Neurological:      Mental Status: She is alert  Ortho Exam:    Body part: right knee    Inspection:  No deformity or swelling noted    Palpation:  Tender to palpation at medial and lateral patellofemoral joint  Range of motion:  Full knee extension and flexion    Resistance to hip abduction and extension reproduces pain  Special Tests:  Valgus and varus without laxity  Negative Lachman  Negative anterior-posterior drawer  Negative Nicolle  Distal Neurovascular Status: Intact, Yes    Large joint arthrocentesis: R knee  Bluford Protocol:  Procedure performed by: (Dr Alix Hatch)  Consent: Verbal consent obtained  Risks and benefits: risks, benefits and alternatives were discussed  Consent given by: patient  Patient understanding: patient states understanding of the procedure being performed  Patient consent: the patient's understanding of the procedure matches consent given  Site marked: the operative site was marked  Radiology Images displayed and confirmed  If images not available, report reviewed: imaging studies available  Patient identity confirmed: verbally with patient    Supporting Documentation  Indications: pain   Procedure Details  Location: knee - R knee  Needle size: 22 G  Ultrasound guidance: no  Approach: anterolateral  Medications administered: 4 mL lidocaine 1 %; 1 mL methylPREDNISolone acetate 40 mg/mL    Patient tolerance: patient tolerated the procedure well with no immediate complications  Dressing:  Sterile dressing applied             Assessment:     Diagnosis ICD-10-CM Associated Orders   1  Patellofemoral disorder of right knee  M22 2X1 Ambulatory Referral to Physical Therapy     Large joint arthrocentesis: R knee     Patella Stabilizer   2  Patellofemoral arthritis of right knee  M17 11 Ambulatory Referral to Physical Therapy     Large joint arthrocentesis: R knee     Patella Stabilizer        Plan:    Discussed clinical exam and findings with Ellie  We also reviewed the radiologic imaging  Symptoms are consistent with right knee patellofemoral joint pain, she does have some mild patellofemoral arthritis on x-rays  She also has symptoms consistent with mild distal ITB syndrome but I do not think this is the primary cause of her symptoms at this time    We discussed trial of cortisone injection today for patellofemoral syndrome pain and she was amenable to this  I also recommended starting physical therapy to work on hip and core strengthening with range of motion  I will give her a patellar stabilizing brace and will see her back for evaluation about 6 weeks  Follow-Up:    6 weeks        Portions of the record may have been created with voice recognition software  Occasional wrong word or "sound alike" substitutions may have occurred due to the inherent limitations of voice recognition software  Please review the chart carefully and recognize, using context, where substitutions/typographical errors may have occurred

## 2022-02-22 ENCOUNTER — OFFICE VISIT (OUTPATIENT)
Dept: OBGYN CLINIC | Facility: OTHER | Age: 47
End: 2022-02-22
Payer: COMMERCIAL

## 2022-02-22 VITALS
DIASTOLIC BLOOD PRESSURE: 80 MMHG | WEIGHT: 193.3 LBS | SYSTOLIC BLOOD PRESSURE: 125 MMHG | BODY MASS INDEX: 29.3 KG/M2 | HEIGHT: 68 IN | HEART RATE: 79 BPM

## 2022-02-22 DIAGNOSIS — M17.11 PATELLOFEMORAL ARTHRITIS OF RIGHT KNEE: ICD-10-CM

## 2022-02-22 DIAGNOSIS — M22.2X1 PATELLOFEMORAL DISORDER OF RIGHT KNEE: Primary | ICD-10-CM

## 2022-02-22 PROCEDURE — 20610 DRAIN/INJ JOINT/BURSA W/O US: CPT | Performed by: STUDENT IN AN ORGANIZED HEALTH CARE EDUCATION/TRAINING PROGRAM

## 2022-02-22 PROCEDURE — 99214 OFFICE O/P EST MOD 30 MIN: CPT | Performed by: STUDENT IN AN ORGANIZED HEALTH CARE EDUCATION/TRAINING PROGRAM

## 2022-02-22 RX ORDER — METHYLPREDNISOLONE ACETATE 40 MG/ML
1 INJECTION, SUSPENSION INTRA-ARTICULAR; INTRALESIONAL; INTRAMUSCULAR; SOFT TISSUE
Status: COMPLETED | OUTPATIENT
Start: 2022-02-22 | End: 2022-02-22

## 2022-02-22 RX ORDER — LIDOCAINE HYDROCHLORIDE 10 MG/ML
4 INJECTION, SOLUTION INFILTRATION; PERINEURAL
Status: COMPLETED | OUTPATIENT
Start: 2022-02-22 | End: 2022-02-22

## 2022-02-22 RX ADMIN — METHYLPREDNISOLONE ACETATE 1 ML: 40 INJECTION, SUSPENSION INTRA-ARTICULAR; INTRALESIONAL; INTRAMUSCULAR; SOFT TISSUE at 09:46

## 2022-02-22 RX ADMIN — LIDOCAINE HYDROCHLORIDE 4 ML: 10 INJECTION, SOLUTION INFILTRATION; PERINEURAL at 09:46

## 2022-02-23 PROBLEM — Z48.89 ENCOUNTER FOR POSTOPERATIVE CARE: Status: ACTIVE | Noted: 2022-02-23

## 2022-02-24 ENCOUNTER — CLINICAL SUPPORT (OUTPATIENT)
Dept: FAMILY MEDICINE CLINIC | Facility: CLINIC | Age: 47
End: 2022-02-24
Payer: COMMERCIAL

## 2022-02-24 DIAGNOSIS — E53.8 B12 DEFICIENCY: Primary | ICD-10-CM

## 2022-02-24 PROCEDURE — 96372 THER/PROPH/DIAG INJ SC/IM: CPT

## 2022-02-24 RX ADMIN — CYANOCOBALAMIN 1000 MCG: 1000 INJECTION INTRAMUSCULAR; SUBCUTANEOUS at 14:26

## 2022-02-25 ENCOUNTER — OFFICE VISIT (OUTPATIENT)
Dept: OBGYN CLINIC | Facility: CLINIC | Age: 47
End: 2022-02-25

## 2022-02-25 VITALS
DIASTOLIC BLOOD PRESSURE: 76 MMHG | BODY MASS INDEX: 30.16 KG/M2 | WEIGHT: 199 LBS | SYSTOLIC BLOOD PRESSURE: 136 MMHG | HEIGHT: 68 IN

## 2022-02-25 DIAGNOSIS — Z09 POSTOPERATIVE EXAMINATION: Primary | ICD-10-CM

## 2022-02-25 PROCEDURE — 99024 POSTOP FOLLOW-UP VISIT: CPT | Performed by: STUDENT IN AN ORGANIZED HEALTH CARE EDUCATION/TRAINING PROGRAM

## 2022-02-25 RX ORDER — CHOLECALCIFEROL (VITAMIN D3) 1250 MCG
CAPSULE ORAL
COMMUNITY
Start: 2022-01-20

## 2022-02-25 NOTE — PROGRESS NOTES
Post-operative visit    Subjective   Ellie Landon is a 40 y o  female who presents to the office status post lsc BS, [address], D&C, endometrial ablation on 2/16/22 for AUB and undesired fertility  Vaginal bleeding: minimal spotting, not daily, not foul smelling  Pain: minimal at incisions and sometimes deeper, but responsive to tylenol  No fevers or chills    Eating a regular diet without difficulty  Bowel movements are normal    Urinating normally without issues    Pathology:   Final Diagnosis   A  Endometrium, curettings:     - Secretory phase endometrium         - Benign appearing endocervical polyp      - No plasma cells, hyperplasia or carcinoma identified      B  Left fallopian tube:     - Segment of one (1) unremarkable fallopian tube; complete cross sections identified      C  Right fallopian tube:     - Segment of one (1) unremarkable fallopian tube; complete cross sections identified               The following portions of the patient's history were reviewed and updated as appropriate:   She  has a past medical history of Arthritis, Asthma, Breast lump, Depression, Disease of thyroid gland, Hair loss, Hemorrhage of conjunctiva, left, Knee joint pain, Lyme disease, and Thyroid disease    She   Patient Active Problem List    Diagnosis Date Noted    Encounter for postoperative care 02/23/2022    Annual physical exam 01/20/2022    B12 deficiency 01/20/2022    Screening for diabetes mellitus 01/20/2022    Encounter for lipid screening for cardiovascular disease 01/20/2022    Right knee pain 01/18/2022    Generalized anxiety disorder 07/13/2021    Routine gynecological examination 05/20/2021    Gynecologic exam normal 03/11/2020    Mild intermittent asthma without complication 98/83/5590    Cervical polyp 03/01/2018    Iron deficiency 04/28/2017    Atypical glandular cell changes cervix of undetermined significance favor dysplasia 03/20/2017    Deviated nasal septum 01/09/2017    Obesity 04/14/2016    Primary osteoarthritis of left knee 07/29/2015    Herpes labialis 12/18/2014    Vitamin B12 deficiency 07/28/2014    Vitamin D deficiency 07/28/2014    Major depressive disorder with single episode, in full remission ([facility] [address] ) 02/05/2013    Allergic rhinitis 07/09/2012    Fibromyalgia 07/09/2012    Hypothyroidism 07/09/2012    Migraine 07/09/2012     She  has a past surgical history that includes Lumbar fusion (01/01/2004); [place] tooth extraction; pr hysteroscopy,w/endo bx (N/A, 2/16/2022); pr hysteroscopy,w/endometrial ablation (N/A, 2/16/2022); and pr lap,rmv  adnexal structure (Bilateral, 2/16/2022)  She  reports that she has never smoked  She has never used smokeless tobacco  She reports current alcohol use  She reports that she does not use drugs    Current Outpatient Medications   Medication Sig Dispense Refill    acetaminophen (TYLENOL) 500 mg tablet Take 2 tablets (1,000 mg total) by mouth every 6 (six) hours as needed for mild pain or moderate pain 30 tablet 0    albuterol (PROAIR HFA) 90 mcg/act inhaler Inhale 2 puffs every 6 (six) hours as needed for wheezing 8 5 g 0    ALPRAZolam (XANAX) 0 5 mg tablet Take 1 tablet (0 5 mg total) by mouth daily as needed for anxiety 30 tablet 0    Cholecalciferol (Vitamin D3) 1 25 MG (91985 UT) CAPS       Cholecalciferol (Vitamin D3) 1 25 MG (37525 UT) TABS Take 1 Tablet once a week 8 tablet 3    ergocalciferol (VITAMIN D2) 50,000 units  (Patient not taking: Reported on 2/2/2022 )      ibuprofen (MOTRIN) 600 mg tablet Take 1 tablet (600 mg total) by mouth every 6 (six) hours as needed for mild pain or moderate pain 30 tablet 0    levothyroxine 125 mcg tablet Take 1 tablet (125 mcg total) by mouth daily 90 tablet 0    oxyCODONE (Roxicodone) 5 immediate release tablet Take 1 tablet (5 mg total) by mouth every 6 (six) hours as needed for severe pain for up to 10 days Max Daily Amount: 20 mg 10 tablet 0    valACYclovir (VALTREX) 1,000 mg tablet Take 1 tablet (1,000 mg total) by mouth 2 (two) times a day for 5 days 10 tablet 0    ZOLMitriptan (ZOMIG) 5 MG tablet Take 1 tablet (5 mg total) by mouth once as needed for migraine for up to 1 dose 6 tablet 0     Current Facility-Administered Medications   Medication Dose Route Frequency Provider Last Rate Last Admin    cyanocobalamin injection 1,000 mcg  1,000 mcg Intramuscular Q30 Days Gretta PastorDO   1,000 mcg at 02/24/22 1426     Current Outpatient Medications on File Prior to Visit   Medication Sig    acetaminophen (TYLENOL) 500 mg tablet Take 2 tablets (1,000 mg total) by mouth every 6 (six) hours as needed for mild pain or moderate pain    albuterol (PROAIR HFA) 90 mcg/act inhaler Inhale 2 puffs every 6 (six) hours as needed for wheezing    ALPRAZolam (XANAX) 0 5 mg tablet Take 1 tablet (0 5 mg total) by mouth daily as needed for anxiety    Cholecalciferol (Vitamin D3) 1 25 MG (27650 UT) CAPS     Cholecalciferol (Vitamin D3) 1 25 MG (62847 UT) TABS Take 1 Tablet once a week    ergocalciferol (VITAMIN D2) 50,000 units  (Patient not taking: Reported on 2/2/2022 )    ibuprofen (MOTRIN) 600 mg tablet Take 1 tablet (600 mg total) by mouth every 6 (six) hours as needed for mild pain or moderate pain    levothyroxine 125 mcg tablet Take 1 tablet (125 mcg total) by mouth daily    oxyCODONE (Roxicodone) 5 immediate release tablet Take 1 tablet (5 mg total) by mouth every 6 (six) hours as needed for severe pain for up to 10 days Max Daily Amount: 20 mg    valACYclovir (VALTREX) 1,000 mg tablet Take 1 tablet (1,000 mg total) by mouth 2 (two) times a day for 5 days    ZOLMitriptan (ZOMIG) 5 MG tablet Take 1 tablet (5 mg total) by mouth once as needed for migraine for up to 1 dose     Current Facility-Administered Medications on File Prior to Visit   Medication    cyanocobalamin injection 1,000 mcg     She is allergic to pollen extract       Pathology:    Review of Systems  Pertinent items are noted in HPI       Objective     /76 (BP Location: Right arm, Patient Position: Sitting, Cuff Size: Standard)   Ht 5' 8" (1 727 m)   Wt 90 3 kg (199 lb)   LMP 02/08/2022   BMI 30 26 kg/m²   General:alert and oriented, in no acute distress  Pulm: normal respiratory effort, clear to auscultation  CV: regular rate and rhythm  Abdomen: soft, non-tender  Incision: lsc incisionsx3 healing well, no drainage, no erythema, no hernia, no seroma, no swelling, no dehiscence, incision well approximated  Extremities: full range of motion       Assessment    Doing well postoperatively  Operative findings again reviewed  Pathology report discussed  Plan   1  Continue any current medications  2  Wound care discussed  3  Activity restrictions: none  4  Anticipated return to work: work letter provided for next Wednesday, can return Monday if feeling great    5  Follow up: 3 months for annual     Ravindra Jacobson MD   02/25/22   11:31 AM

## 2022-02-25 NOTE — LETTER
February 25, 2022     Patient: Jaciel Mercado   YOB: 1975   Date of Visit: 2/25/2022       To Whom it May Concern:    Jaciel Mercado is under my professional care  She underwent surgery on Wednesday, February 16th and she was seen in my office on 2/25/2022  She may return to work on Wednesday, March 2nd  If you have any questions or concerns, please don't hesitate to call           Sincerely,          Haze Buerger, MD        CC: No Recipients

## 2022-03-01 ENCOUNTER — OFFICE VISIT (OUTPATIENT)
Dept: PHYSICAL THERAPY | Facility: CLINIC | Age: 47
End: 2022-03-01
Payer: COMMERCIAL

## 2022-03-01 DIAGNOSIS — M76.31 ILIOTIBIAL BAND SYNDROME OF RIGHT SIDE: Primary | ICD-10-CM

## 2022-03-01 PROCEDURE — 97140 MANUAL THERAPY 1/> REGIONS: CPT | Performed by: PHYSICAL THERAPIST

## 2022-03-01 PROCEDURE — 97112 NEUROMUSCULAR REEDUCATION: CPT | Performed by: PHYSICAL THERAPIST

## 2022-03-01 PROCEDURE — 97110 THERAPEUTIC EXERCISES: CPT | Performed by: PHYSICAL THERAPIST

## 2022-03-01 NOTE — PROGRESS NOTES
PT Re-Evaluation    Today's date: 3/1/2022   Patient name: Antwon Garcia  : 1975  MRN: [id number]  Referring provider: Hui Benítez Chi  Dx:   Encounter Diagnosis     ICD-10-CM    1  Iliotibial band syndrome of right side  M76 31            Subjective Evaluation     History of Present Illness    She had an injection and had 2 days of pain relief then her pain came back  She states she wants to lose the 15lbs  She has been wearing brace s much relief  She does not feel like she will fall  She was hoping to go to the gym this week  She has also gotten relief from taping  Hx of injury:  Patient presents with c/o R ITB pain after moving  She likes to walk/jog and go to the gym  She had R knee pain and now it is giving out on her  She has had Spinal fusion of Lumbar spine in   She notices her kneecap will pop to the L and crunches  She is not going to the gym   Neuro signs: none  Bowel and bladder sxs: none  Red flags: none  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      Pain  At best pain ratin  At worst pain ratin  Location: Posterior lateral knee and lateral patella region  Quality: throbs, shooting  Relieving factors: ibuprofen, tiger balm, injection  Aggravating factors: leaving leg straight for a while, kneeling, down stairs, elliptical, taking off shoes    Social Support  Lives at home c her mom      Patient Goals  Patient goals for therapy: Wants to get back to the gym and walk pain free    STGs  1  Decrease pain by 20% in 2-4 weeks to improve standing tolerance  - met  2  Improve knee ext ROM to full in 2-4 weeks to improve sit to stand  - met  3  Improve hip strength by 1/3 grade in 2-4 weeks to improve stairs performance s pain  - not met      LTGs  1  Decrease pain by 60% in 6-8 weeks  2  Improve walking tolerance to >30 minutes in 6-8 weeks to perform community ambulation  3  Perform job/dressing/showering activities independently without pain in 6-8 weeks      4  Improve stairs tolerance to 1 flight in step through pattern in 8 weeks  Objective Measurements:    Observation:  Heel/toe walk:  Balance: unable to fully WB on RLE s pain  Gait: L toe adduction and R rearfoot pronation and eversion  Squat: Pain upon rising and clicking in knees lowered depth  Reflexes:nT   Sensation: NT   Myotomes:WFL      Slump: -R knee painful SLR: painful    Hip distraction:nt  Elys: R 95 p   DEL: tibial IR hypomobility c knee at 90 and 30 flexion  - improved   Palpation:patellar tendon ttp lateral facet and medial facet ttp     Lumbar ROM L R Strength knee L R   Flex  [facility] WFL Flex 5 4+p   Ext  R knee pain inconsistent 5 reps Ext 5 4p   Rot WFL WFL      SB        Hip A/PROM [facility] WFL except      Flex  /  / Flex 4+ 4+   ER at 90   ER     IR at 90  Pain in Medial knee at 25 IR     Abd   Abd 4+ 4-   Ext   Ext 5 5           Knee A/PROM   Ankle     Flex 130 95p DF 5 5   Ext -6 2p/p PF         Assessment:  Ellie Cross has demonstrated overall improvement in ROM, strength, function, and a reduction in pain  Currently, she has made steady progress towards her goals, but continues to remain limited with knee flexion ROM, decreased R hip strength, Postural deficits, and decreased endurance  At this time, skilled physical therapy is warranted to address the remaining impairments and aide in return to squatting, elliptical at the gym, and performing recreational walking  Limiting factors to therapy high irritability but she may benefit from patellar distraction to limit pain and she  demonstrates good motivation  Plan  Patient would benefit from:Skilled physical therapy  Planned therapy interventions: manual therapy, neuromuscular re-education, stretching, strengthening, therapeutic activities, therapeutic exercise, patient education, home exercise program, and activity modification      Frequency: 2x week  Duration in weeks: 8  Treatment plan discussed with: patient               Goals: Patient's goal is to return to gym program    Precautions: none  Dx: R knee ext/tibial IR hypomobility- ITB pain    Daily Treatment Diary     Manuals 1/27/2022 2/1 2/7 2/15 3/1    abdi taping lat to med 3' 3' 3'  3'    Patellar distraction     nv    Tibial IR mob gr 3-4 5' 5' 5' 5' 8'    Sensation/reflexds hip distraction check         Ther Ex         treadmill  Bike - treadmill nv 6 min nv     Tibial ir seated on slide board 20x   20x 20x    S/l clamshells  2x20 2x20 2x20 2x20    Standing hip abd R  2x10 2x10      Prone quad st when neptali  nv 3x :20 3x :20 3x :20    R hip abd iso  Painful in HS 10x :05      SL LP #35   20x                        Neuro Re-ed         Quad set c patellar glide  5x pain 10x       SLB   10x :05 pain  10x :05                               Ther Activity         TG L 16  5 reps pain                         Gait Training                           Modalities

## 2022-03-08 ENCOUNTER — APPOINTMENT (OUTPATIENT)
Dept: PHYSICAL THERAPY | Facility: CLINIC | Age: 47
End: 2022-03-08
Payer: COMMERCIAL

## 2022-03-15 ENCOUNTER — OFFICE VISIT (OUTPATIENT)
Dept: PHYSICAL THERAPY | Facility: CLINIC | Age: 47
End: 2022-03-15
Payer: COMMERCIAL

## 2022-03-15 DIAGNOSIS — M76.31 ILIOTIBIAL BAND SYNDROME OF RIGHT SIDE: Primary | ICD-10-CM

## 2022-03-15 PROCEDURE — 97140 MANUAL THERAPY 1/> REGIONS: CPT | Performed by: PHYSICAL THERAPIST

## 2022-03-15 PROCEDURE — 97112 NEUROMUSCULAR REEDUCATION: CPT | Performed by: PHYSICAL THERAPIST

## 2022-03-15 PROCEDURE — 97110 THERAPEUTIC EXERCISES: CPT | Performed by: PHYSICAL THERAPIST

## 2022-03-15 NOTE — PROGRESS NOTES
Daily Note     Today's date: 3/15/2022  Patient name: Kelsie Vogel  : 1975  MRN: [id number]  Referring provider: Kathy Tinajero  Dx:   Encounter Diagnosis     ICD-10-CM    1  Iliotibial band syndrome of right side  M76 31                   Subjective: She states she had pain c kneeling at [employer]  She would like to get out and walking  She feels like the Am is better for her pain but as the day wears on it is worse  Objective: See treatment diary below  Sensation: WFL  Reflexes: WFL    Assessment: Tolerated treatment well  LAD did seem to help a bit as she was able to WB better on her R knee and demonstrated less limping  Patient would benefit from continued PT      Plan: Continue per plan of care        Goals: Patient's goal is to return to gym program and walking longer distances    Precautions: none  Dx: R knee ext/tibial IR hypomobility- ITB pain    Daily Treatment Diary     Manuals 2022 2/1 2/7 2/15 3/1 3/15   abdi taping lat to med 3' 3' 3'  3'    Patellar distraction     nv nv-declined due to outfit   Tibial IR mob gr 3-4 5' 5' 5' 5' 8'    Sensation/reflexds hip distraction check      done   Ther Ex         treadmill  Bike - treadmill nv 6 min nv  6'   Tibial ir seated on slide board 20x   20x 20x    S/l clamshells  2x20 2x20 2x20 2x20 2x20   Standing hip abd R  2x10 2x10      Prone quad st when neptali  nv 3x :20 3x :20 3x :20    R hip abd iso  Painful in HS 10x :05      SL LP #35   20x                        Neuro Re-ed         Quad set c patellar glide  5x pain 10x    15x :05 no glide   SLB   10x :05 pain  10x :05 5x :10                              Ther Activity         TG L 16  5 reps pain                         Gait Training                           Modalities

## 2022-03-17 ENCOUNTER — HOSPITAL ENCOUNTER (OUTPATIENT)
Dept: RADIOLOGY | Age: 47
Discharge: HOME | End: 2022-03-17
Payer: COMMERCIAL

## 2022-03-17 VITALS — BODY MASS INDEX: 30.31 KG/M2 | HEIGHT: 68 IN | WEIGHT: 200 LBS

## 2022-03-17 DIAGNOSIS — Z12.31 ENCOUNTER FOR SCREENING MAMMOGRAM FOR MALIGNANT NEOPLASM OF BREAST: ICD-10-CM

## 2022-03-17 PROCEDURE — 77063 BREAST TOMOSYNTHESIS BI: CPT

## 2022-03-17 PROCEDURE — 77067 SCR MAMMO BI INCL CAD: CPT

## 2022-03-22 ENCOUNTER — OFFICE VISIT (OUTPATIENT)
Dept: PHYSICAL THERAPY | Facility: CLINIC | Age: 47
End: 2022-03-22
Payer: COMMERCIAL

## 2022-03-22 DIAGNOSIS — M76.31 ILIOTIBIAL BAND SYNDROME OF RIGHT SIDE: Primary | ICD-10-CM

## 2022-03-22 PROCEDURE — 97110 THERAPEUTIC EXERCISES: CPT | Performed by: PHYSICAL THERAPIST

## 2022-03-22 PROCEDURE — 97140 MANUAL THERAPY 1/> REGIONS: CPT | Performed by: PHYSICAL THERAPIST

## 2022-03-22 NOTE — PROGRESS NOTES
Daily Note     Today's date: 3/22/2022  Patient name: Tony Wall  : 1975  MRN: [id number]  Referring provider: Paris Oneill  Dx:   Encounter Diagnosis     ICD-10-CM    1  Iliotibial band syndrome of right side  M76 31                   Subjective: She states she will be going to [facility] in May and would like to improve her walking and bending by then  She did feel sore for 1-2 hrs after last session  She arrived 7 mins late and was accommodated  Objective: See treatment diary below  Sensation: WFL  Reflexes: WFL     Assessment: Tolerated treatment well  LAD trialed again s signficant improvement  Trialed patellar distraction today c good tolerance and she did seem hypomobile with lateral mobility  If improved could trial medial to lateral taping  Patient would benefit from continued PT  Lumbar derangement was not fully ruled out yet but it does appear to be more mechanical in origin at the knee and will continue to help the knee mechanics with hip strengthening and patellar mobility  Plan: Continue per plan of care        Goals: Patient's goal is to return to gym program and walking longer distances    Precautions: lumbar fusion   Dx: R knee ext/tibial IR hypomobility- ITB pain    Daily Treatment Diary     Manuals 3/22/2022 2/1 2/7 2/15 3/1 3/15   abdi taping lat to med  3' 3'  3'    Patellar distraction and lateral glide 10'    nv nv-declined due to outfit   Tibial IR mob gr 3-4 5' 5' 5' 5' 8'             Ther Ex         treadmill 6' Bike - treadmill nv 6 min nv  6'   Tibial ir seated on slide board 20x   20x 20x    S/l clamshells RTB 20x2 2x20 2x20 2x20 2x20 2x20   Standing hip abd R  2x10 2x10      Prone quad st when neptali  nv 3x :20 3x :20 3x :20    R hip abd iso  Painful in HS 10x :05      SL LP #35 nv  20x                        Neuro Re-ed         Quad set c patellar glide  5x pain 10x    15x :05 no glide   SLB   10x :05 pain  10x :05 5x :10 Ther Activity         TG L 16  5 reps pain                         Gait Training                           Modalities

## 2022-03-25 ENCOUNTER — TELEPHONE (OUTPATIENT)
Dept: FAMILY MEDICINE CLINIC | Facility: CLINIC | Age: 47
End: 2022-03-25

## 2022-03-29 ENCOUNTER — APPOINTMENT (OUTPATIENT)
Dept: PHYSICAL THERAPY | Facility: CLINIC | Age: 47
End: 2022-03-29
Payer: COMMERCIAL

## 2022-03-30 ENCOUNTER — HOSPITAL ENCOUNTER (OUTPATIENT)
Dept: RADIOLOGY | Age: 47
Discharge: HOME | End: 2022-03-30
Payer: COMMERCIAL

## 2022-03-30 DIAGNOSIS — M76.31 ILIOTIBIAL BAND SYNDROME OF RIGHT SIDE: ICD-10-CM

## 2022-03-30 DIAGNOSIS — M25.561 RIGHT KNEE PAIN, UNSPECIFIED CHRONICITY: ICD-10-CM

## 2022-03-30 DIAGNOSIS — M17.11 PATELLOFEMORAL ARTHRITIS OF RIGHT KNEE: ICD-10-CM

## 2022-03-30 PROCEDURE — 73721 MRI JNT OF LWR EXTRE W/O DYE: CPT

## 2022-03-30 PROCEDURE — G1004 CDSM NDSC: HCPCS

## 2022-04-01 ENCOUNTER — OFFICE VISIT (OUTPATIENT)
Dept: PHYSICAL THERAPY | Facility: CLINIC | Age: 47
End: 2022-04-01
Payer: COMMERCIAL

## 2022-04-01 DIAGNOSIS — M76.31 ILIOTIBIAL BAND SYNDROME OF RIGHT SIDE: Primary | ICD-10-CM

## 2022-04-01 PROCEDURE — 97140 MANUAL THERAPY 1/> REGIONS: CPT | Performed by: PHYSICAL THERAPIST

## 2022-04-01 PROCEDURE — 97110 THERAPEUTIC EXERCISES: CPT | Performed by: PHYSICAL THERAPIST

## 2022-04-01 NOTE — PROGRESS NOTES
Daily Note     Today's date: 2022  Patient name: Khloe Alicea  : 1975  MRN: [id number]  Referring provider: Dieter Bennett [place]  Dx:   Encounter Diagnosis     ICD-10-CM    1  Iliotibial band syndrome of right side  M76 31                   Subjective: She states she had less clicking and not as painful nor intense after last session and lasted for the weekend  She thinks it was due to performing patellar distraction  Objective: See treatment diary below  Sensation: WFL  Reflexes: WFL     Assessment: Tolerated treatment well  Continued c taping and lateral distraction which did help her pain c knee flexion and distraction alone helped c quad set  Patient would benefit from continued PT  Lumbar derangement was not fully ruled out yet  Plan: Continue per plan of care        Goals: Patient's goal is to return to gym program and walking longer distances    Precautions: lumbar fusion   Dx: R knee ext/tibial IR hypomobility- ITB pain    Daily Treatment Diary     Manuals 3/22/2022 4/1 2/7 2/15 3/1 3/15   abdi taping  Med to lat   3' 3'  3'    Patellar distraction and lateral glide 10' 10'   nv nv-declined due to outfit   Tibial IR mob gr 3-4 5'  5' 5' 8'             Ther Ex         treadmill 6'  6 min nv  6'   Tibial ir seated on slide board 20x   20x 20x    S/l clamshells RTB 20x2 2x20 2x20 2x20 2x20 2x20   Standing hip abd R   2x10      Prone quad st when neptali   3x :20 3x :20 3x :20    R hip abd iso   10x :05      SL LP #35 nv  20x                        Neuro Re-ed         Quad set c patellar distraction  10x  10x    15x :05 no glide   SLB   10x :05 pain  10x :05 5x :10   Heel slide c patellar distraction and lateral glide  5x                         Ther Activity         TG L 16                           Gait Training                           Modalities

## 2022-04-06 ENCOUNTER — OFFICE VISIT (OUTPATIENT)
Dept: OBGYN CLINIC | Facility: OTHER | Age: 47
End: 2022-04-06
Payer: COMMERCIAL

## 2022-04-06 VITALS
SYSTOLIC BLOOD PRESSURE: 107 MMHG | HEART RATE: 76 BPM | BODY MASS INDEX: 29.77 KG/M2 | DIASTOLIC BLOOD PRESSURE: 78 MMHG | HEIGHT: 68 IN | WEIGHT: 196.4 LBS

## 2022-04-06 DIAGNOSIS — M17.11 PATELLOFEMORAL ARTHRITIS OF RIGHT KNEE: Primary | ICD-10-CM

## 2022-04-06 DIAGNOSIS — M17.11 PRIMARY OSTEOARTHRITIS OF RIGHT KNEE: ICD-10-CM

## 2022-04-06 PROCEDURE — 99213 OFFICE O/P EST LOW 20 MIN: CPT | Performed by: ORTHOPAEDIC SURGERY

## 2022-04-06 NOTE — PROGRESS NOTES
Assessment:       1  Patellofemoral arthritis of right knee    2  Primary osteoarthritis of right knee          Plan:        I had a detailed discussion with Ellie with regards to her persistent right knee pain and also reviewed the results of her right knee MRI in this regard  We discussed short-term and long-term management of knee osteoarthritis  Unfortunately, she has not had much improvement from right knee intra-articular cortisone injection  I provided her with the options of ultrasound-guided intra-articular viscosupplementation injection or platelet rich plasma injection  We also discussed that the long-term definitive management of advanced knee osteoarthritis would be a total knee replacement arthroplasty  However, given her relatively younger age she would like to continue with conservative management  She has opted for a trial of ultrasound-guided viscosupplementation injection for which an insurance approval will be sought  We will see her for this procedure following the insurance approval             Subjective:     Patient ID: Reece Garcia is a 40 y o  female  Chief Complaint:  Right knee pain    HPI  Ellie is a 67-year-old lady who presents today for a follow-up of her right knee patellofemoral pain  She was last seen in this regard on 2/22/2022 and reported nontraumatic onset right knee pain  She has been doing physical therapy in this regard  Plain radiograph of the right knee had revealed some mild patellofemoral arthritis  She did receive a right knee corticosteroid injection at the last office  Today, the patient reports that her right knee pain only improved for a couple of days following the cortisone injection    She did subsequently have an MRI of the right knee performed on 3/30/2022 which has revealed multifocal high-grade chondral fissuring at the patellar apex, grade 3 chondrosis in the central weight-bearing part of the medial femoral condyle as well as subchondral edema in the weight-bearing lateral femoral condyle and adjacent lateral tibial plateau  Patient reports continued discomfort with knee flexion but denies any true locking episodes  Social History     Occupational History    Not on file   Tobacco Use    Smoking status: Never Smoker    Smokeless tobacco: Never Used   Vaping Use    Vaping Use: Never used   Substance and Sexual Activity    Alcohol use: Yes     Comment: occ    Drug use: No    Sexual activity: Yes     Partners: Male      Review of Systems        Objective:     Right Knee Exam     Tenderness   Right knee tenderness location: Patellofemoral crepitus and tenderness  Range of Motion   Extension: normal   Flexion: 100     Tests   Nicolle:  Medial - negative Lateral - negative  Varus: negative Valgus: negative  Lachman:  Anterior - negative      Patellar apprehension: negative    Other   Sensation: normal  Pulse: present  Swelling: none  Effusion: no effusion present          Observations     Right Knee   Negative for effusion  Physical Exam  Vitals and nursing note reviewed  Constitutional:       Appearance: She is well-developed  HENT:      Head: Normocephalic and atraumatic  Eyes:      Conjunctiva/sclera: Conjunctivae normal       Pupils: Pupils are equal, round, and reactive to light  Cardiovascular:      Rate and Rhythm: Normal rate and regular rhythm  Pulmonary:      Effort: Pulmonary effort is normal  No respiratory distress  Musculoskeletal:      Right knee: No effusion  Instability Tests: Medial Nicolle test negative and lateral Nicolle test negative  Skin:     General: Skin is warm and dry  Findings: No erythema  Neurological:      Mental Status: She is alert and oriented to person, place, and time  Cranial Nerves: No cranial nerve deficit  Psychiatric:         Behavior: Behavior normal          Thought Content:  Thought content normal          Judgment: Judgment normal            I have personally reviewed pertinent films in PACS

## 2022-04-07 ENCOUNTER — APPOINTMENT (OUTPATIENT)
Dept: PHYSICAL THERAPY | Facility: CLINIC | Age: 47
End: 2022-04-07
Payer: COMMERCIAL

## 2022-05-11 NOTE — PROGRESS NOTES
Discharge Note  Rissa Arroyo has made good functional progress with physical therapy  she was educated and updated in her home exercise program  Patricia Luck will be discharged from formal therapy due to no further visits but will follow up as needed

## 2022-05-13 DIAGNOSIS — E03.9 HYPOTHYROIDISM, UNSPECIFIED TYPE: ICD-10-CM

## 2022-05-13 DIAGNOSIS — B00.1 COLD SORE: ICD-10-CM

## 2022-05-15 ENCOUNTER — HOSPITAL ENCOUNTER (EMERGENCY)
Facility: HOSPITAL | Age: 47
Discharge: HOME | End: 2022-05-15
Attending: EMERGENCY MEDICINE | Admitting: EMERGENCY MEDICINE
Payer: COMMERCIAL

## 2022-05-15 ENCOUNTER — APPOINTMENT (EMERGENCY)
Dept: CT IMAGING | Facility: HOSPITAL | Age: 47
End: 2022-05-15
Payer: COMMERCIAL

## 2022-05-15 ENCOUNTER — APPOINTMENT (EMERGENCY)
Dept: RADIOLOGY | Facility: HOSPITAL | Age: 47
End: 2022-05-15
Payer: COMMERCIAL

## 2022-05-15 VITALS
HEART RATE: 83 BPM | HEIGHT: 68 IN | OXYGEN SATURATION: 97 % | TEMPERATURE: 98.5 F | DIASTOLIC BLOOD PRESSURE: 92 MMHG | WEIGHT: 200 LBS | SYSTOLIC BLOOD PRESSURE: 127 MMHG | BODY MASS INDEX: 30.31 KG/M2 | RESPIRATION RATE: 22 BRPM

## 2022-05-15 DIAGNOSIS — J45.20 MILD INTERMITTENT ASTHMA WITHOUT COMPLICATION: ICD-10-CM

## 2022-05-15 DIAGNOSIS — R06.00 DYSPNEA: ICD-10-CM

## 2022-05-15 DIAGNOSIS — U07.1 COVID-19: Primary | ICD-10-CM

## 2022-05-15 LAB
ALBUMIN SERPL BCP-MCNC: 3.9 G/DL (ref 3.5–5)
ALP SERPL-CCNC: 89 U/L (ref 34–104)
ALT SERPL W P-5'-P-CCNC: 12 U/L (ref 7–52)
ANION GAP SERPL CALCULATED.3IONS-SCNC: 11 MMOL/L (ref 4–13)
AST SERPL W P-5'-P-CCNC: 23 U/L (ref 13–39)
ATRIAL RATE: 108 BPM
BASOPHILS # BLD AUTO: 0.04 THOUSANDS/ΜL (ref 0–0.1)
BASOPHILS NFR BLD AUTO: 1 % (ref 0–1)
BILIRUB SERPL-MCNC: 0.58 MG/DL (ref 0.2–1)
BILIRUB UR QL STRIP: NEGATIVE
BNP SERPL-MCNC: 9 PG/ML (ref 0–100)
BUN SERPL-MCNC: 8 MG/DL (ref 5–25)
CALCIUM SERPL-MCNC: 9.1 MG/DL (ref 8.4–10.2)
CARDIAC TROPONIN I PNL SERPL HS: <2 NG/L
CHLORIDE SERPL-SCNC: 103 MMOL/L (ref 96–108)
CLARITY UR: NORMAL
CO2 SERPL-SCNC: 24 MMOL/L (ref 21–32)
COLOR UR: YELLOW
CREAT SERPL-MCNC: 0.68 MG/DL (ref 0.6–1.3)
D DIMER PPP FEU-MCNC: 1.17 UG/ML FEU
EOSINOPHIL # BLD AUTO: 0.14 THOUSAND/ΜL (ref 0–0.61)
EOSINOPHIL NFR BLD AUTO: 2 % (ref 0–6)
ERYTHROCYTE [DISTWIDTH] IN BLOOD BY AUTOMATED COUNT: 13.2 % (ref 11.6–15.1)
EXT PREG TEST URINE: NEGATIVE
EXT. CONTROL ED NAV: NORMAL
FLUAV RNA RESP QL NAA+PROBE: NEGATIVE
FLUBV RNA RESP QL NAA+PROBE: NEGATIVE
GFR SERPL CREATININE-BSD FRML MDRD: 105 ML/MIN/1.73SQ M
GLUCOSE SERPL-MCNC: 93 MG/DL (ref 65–140)
GLUCOSE UR STRIP-MCNC: NEGATIVE MG/DL
HCT VFR BLD AUTO: 45.4 % (ref 34.8–46.1)
HGB BLD-MCNC: 14.8 G/DL (ref 11.5–15.4)
HGB UR QL STRIP.AUTO: NEGATIVE
IMM GRANULOCYTES # BLD AUTO: 0.02 THOUSAND/UL (ref 0–0.2)
IMM GRANULOCYTES NFR BLD AUTO: 0 % (ref 0–2)
KETONES UR STRIP-MCNC: NEGATIVE MG/DL
LACTATE SERPL-SCNC: 1.1 MMOL/L (ref 0.5–2)
LACTATE SERPL-SCNC: 2.3 MMOL/L (ref 0.5–2)
LEUKOCYTE ESTERASE UR QL STRIP: NEGATIVE
LYMPHOCYTES # BLD AUTO: 2.97 THOUSANDS/ΜL (ref 0.6–4.47)
LYMPHOCYTES NFR BLD AUTO: 46 % (ref 14–44)
MAGNESIUM SERPL-MCNC: 1.8 MG/DL (ref 1.9–2.7)
MCH RBC QN AUTO: 30.3 PG (ref 26.8–34.3)
MCHC RBC AUTO-ENTMCNC: 32.6 G/DL (ref 31.4–37.4)
MCV RBC AUTO: 93 FL (ref 82–98)
MONOCYTES # BLD AUTO: 0.7 THOUSAND/ΜL (ref 0.17–1.22)
MONOCYTES NFR BLD AUTO: 11 % (ref 4–12)
NEUTROPHILS # BLD AUTO: 2.56 THOUSANDS/ΜL (ref 1.85–7.62)
NEUTS SEG NFR BLD AUTO: 40 % (ref 43–75)
NITRITE UR QL STRIP: NEGATIVE
NRBC BLD AUTO-RTO: 0 /100 WBCS
P AXIS: 42 DEGREES
PH UR STRIP.AUTO: 8 [PH]
PLATELET # BLD AUTO: 258 THOUSANDS/UL (ref 149–390)
PMV BLD AUTO: 10.9 FL (ref 8.9–12.7)
POTASSIUM SERPL-SCNC: 4.5 MMOL/L (ref 3.5–5.3)
PR INTERVAL: 124 MS
PROT SERPL-MCNC: 7.6 G/DL (ref 6.4–8.4)
PROT UR STRIP-MCNC: NEGATIVE MG/DL
QRS AXIS: 28 DEGREES
QRSD INTERVAL: 88 MS
QT INTERVAL: 324 MS
QTC INTERVAL: 427 MS
RBC # BLD AUTO: 4.89 MILLION/UL (ref 3.81–5.12)
RSV RNA RESP QL NAA+PROBE: NEGATIVE
SARS-COV-2 RNA RESP QL NAA+PROBE: POSITIVE
SODIUM SERPL-SCNC: 138 MMOL/L (ref 135–147)
SP GR UR STRIP.AUTO: 1.01 (ref 1–1.03)
T WAVE AXIS: 0 DEGREES
UROBILINOGEN UR QL STRIP.AUTO: 1 E.U./DL
VENTRICULAR RATE: 104 BPM
WBC # BLD AUTO: 6.43 THOUSAND/UL (ref 4.31–10.16)

## 2022-05-15 PROCEDURE — 99285 EMERGENCY DEPT VISIT HI MDM: CPT | Performed by: EMERGENCY MEDICINE

## 2022-05-15 PROCEDURE — 93010 ELECTROCARDIOGRAM REPORT: CPT | Performed by: INTERNAL MEDICINE

## 2022-05-15 PROCEDURE — 81003 URINALYSIS AUTO W/O SCOPE: CPT | Performed by: EMERGENCY MEDICINE

## 2022-05-15 PROCEDURE — 96374 THER/PROPH/DIAG INJ IV PUSH: CPT

## 2022-05-15 PROCEDURE — 93005 ELECTROCARDIOGRAM TRACING: CPT

## 2022-05-15 PROCEDURE — 99285 EMERGENCY DEPT VISIT HI MDM: CPT

## 2022-05-15 PROCEDURE — 83735 ASSAY OF MAGNESIUM: CPT | Performed by: EMERGENCY MEDICINE

## 2022-05-15 PROCEDURE — 83605 ASSAY OF LACTIC ACID: CPT | Performed by: EMERGENCY MEDICINE

## 2022-05-15 PROCEDURE — 71045 X-RAY EXAM CHEST 1 VIEW: CPT

## 2022-05-15 PROCEDURE — 96361 HYDRATE IV INFUSION ADD-ON: CPT

## 2022-05-15 PROCEDURE — 0241U HB NFCT DS VIR RESP RNA 4 TRGT: CPT | Performed by: EMERGENCY MEDICINE

## 2022-05-15 PROCEDURE — 71275 CT ANGIOGRAPHY CHEST: CPT

## 2022-05-15 PROCEDURE — 36415 COLL VENOUS BLD VENIPUNCTURE: CPT | Performed by: EMERGENCY MEDICINE

## 2022-05-15 PROCEDURE — 83880 ASSAY OF NATRIURETIC PEPTIDE: CPT | Performed by: EMERGENCY MEDICINE

## 2022-05-15 PROCEDURE — 85379 FIBRIN DEGRADATION QUANT: CPT | Performed by: EMERGENCY MEDICINE

## 2022-05-15 PROCEDURE — 85025 COMPLETE CBC W/AUTO DIFF WBC: CPT | Performed by: EMERGENCY MEDICINE

## 2022-05-15 PROCEDURE — 81025 URINE PREGNANCY TEST: CPT | Performed by: EMERGENCY MEDICINE

## 2022-05-15 PROCEDURE — 80053 COMPREHEN METABOLIC PANEL: CPT | Performed by: EMERGENCY MEDICINE

## 2022-05-15 PROCEDURE — 84484 ASSAY OF TROPONIN QUANT: CPT | Performed by: EMERGENCY MEDICINE

## 2022-05-15 RX ORDER — KETOROLAC TROMETHAMINE 30 MG/ML
15 INJECTION, SOLUTION INTRAMUSCULAR; INTRAVENOUS ONCE
Status: COMPLETED | OUTPATIENT
Start: 2022-05-15 | End: 2022-05-15

## 2022-05-15 RX ORDER — ACETAMINOPHEN 325 MG/1
975 TABLET ORAL ONCE
Status: COMPLETED | OUTPATIENT
Start: 2022-05-15 | End: 2022-05-15

## 2022-05-15 RX ORDER — ALBUTEROL SULFATE 90 UG/1
2 AEROSOL, METERED RESPIRATORY (INHALATION) EVERY 6 HOURS PRN
Qty: 8.5 G | Refills: 0 | Status: SHIPPED | OUTPATIENT
Start: 2022-05-15 | End: 2022-05-16 | Stop reason: SDUPTHER

## 2022-05-15 RX ADMIN — KETOROLAC TROMETHAMINE 15 MG: 30 INJECTION, SOLUTION INTRAMUSCULAR at 18:12

## 2022-05-15 RX ADMIN — SODIUM CHLORIDE 1000 ML: 0.9 INJECTION, SOLUTION INTRAVENOUS at 18:12

## 2022-05-15 RX ADMIN — ACETAMINOPHEN 975 MG: 325 TABLET ORAL at 18:13

## 2022-05-15 RX ADMIN — IOHEXOL 65 ML: 350 INJECTION, SOLUTION INTRAVENOUS at 21:17

## 2022-05-15 NOTE — Clinical Note
Nhan Hernández was seen and treated in our emergency department on 5/15/2022  Diagnosis: covid19    Ellie  may return to work on return date  She may return on this date: 05/19/2022         If you have any questions or concerns, please don't hesitate to call        Panfilo Jernigan, DO    ______________________________           _______________          _______________  Hospital Representative                              Date                                Time

## 2022-05-15 NOTE — ED PROVIDER NOTES
History  Chief Complaint   Patient presents with    Shortness of Breath     Pt c/o SOB, sore throat, body aches, and HA since Thursday  Covid + Saturday  70-year-old female coming into the ED for evaluation shortness of breath, sore throat, myalgias, headache, cough for the past 4 days since Thursday  She initially tested negative for COVID-19 but yesterday tested positive at home by rapid test   She states she is starting to feel more short of breath today  Denies any leg pain or swelling  She is vaccinated x3 for COVID-19  She has never had COVID-19 to her knowledge  History provided by:  Patient   used: No    Shortness of Breath  Associated symptoms: cough and fever        Prior to Admission Medications   Prescriptions Last Dose Informant Patient Reported? Taking?    ALPRAZolam (XANAX) 0 5 mg tablet   No No   Sig: Take 1 tablet (0 5 mg total) by mouth daily as needed for anxiety   Cholecalciferol (Vitamin D3) 1 25 MG (45103 UT) CAPS   Yes No   Cholecalciferol (Vitamin D3) 1 25 MG (29387 UT) TABS   No No   Sig: Take 1 Tablet once a week   ZOLMitriptan (ZOMIG) 5 MG tablet   No No   Sig: Take 1 tablet (5 mg total) by mouth once as needed for migraine for up to 1 dose   acetaminophen (TYLENOL) 500 mg tablet   No No   Sig: Take 2 tablets (1,000 mg total) by mouth every 6 (six) hours as needed for mild pain or moderate pain   albuterol (PROAIR HFA) 90 mcg/act inhaler   No No   Sig: Inhale 2 puffs every 6 (six) hours as needed for wheezing   albuterol (ProAir HFA) 90 mcg/act inhaler   No Yes   Sig: Inhale 2 puffs every 6 (six) hours as needed for wheezing   ergocalciferol (VITAMIN D2) 50,000 units   Yes No   Patient not taking: Reported on 2/2/2022    ibuprofen (MOTRIN) 600 mg tablet   No No   Sig: Take 1 tablet (600 mg total) by mouth every 6 (six) hours as needed for mild pain or moderate pain   levothyroxine 125 mcg tablet   No No   Sig: Take 1 tablet (125 mcg total) by mouth daily valACYclovir (VALTREX) 1,000 mg tablet   No No   Sig: Take 1 tablet (1,000 mg total) by mouth 2 (two) times a day for 5 days      Facility-Administered Medications Last Administration Doses Remaining   cyanocobalamin injection 1,000 mcg 2/24/2022  2:26 PM           Past Medical History:   Diagnosis Date    Arthritis     Asthma     Breast lump     resolved [id number]    Depression     Disease of thyroid gland     Hair loss     Hemorrhage of conjunctiva, left     last assessed [id number]    Knee joint pain     right    Lyme disease     lastt assessed [id number]    Thyroid disease        Past Surgical History:   Procedure Laterality Date    LUMBAR FUSION  01/01/2004    Lumbar Vertebral Fusion;  2004    AZ HYSTEROSCOPY,W/ENDO BX N/A 2/16/2022    Procedure: DILATATION AND CURETTAGE (D&C) WITH HYSTEROSCOPY;  Surgeon: Inocencia Vidal MD;  Location: [facility];  Service: Gynecology    AZ HYSTEROSCOPY,W/ENDOMETRIAL ABLATION N/A 2/16/2022    Procedure: ABLATION ENDOMETRIAL FREDDY;  Surgeon: Inocencia Vidal MD;  Location: [facility];  Service: Gynecology    AZ LAP,RMV  ADNEXAL STRUCTURE Bilateral 2/16/2022    Procedure: SALPINGECTOMY, LAPAROSCOPIC;  Surgeon: Inocencia Vidal MD;  Location: [facility];  Service: Gynecology    WISDOM TOOTH EXTRACTION         Family History   Problem Relation Age of Onset    Atrial fibrillation Mother     Hyperlipidemia Mother     Hypertension Mother     Supraventricular tachycardia Mother     Heart disease Mother     Diabetes Father     Hyperlipidemia Father     Hypertension Father     Arthritis Maternal Grandmother     Leukemia Maternal Grandmother     Cancer Maternal Grandmother         blood ca    Stroke Maternal Grandfather     Pancreatic cancer Maternal Grandfather 72    No Known Problems Sister     No Known Problems Paternal Grandmother     No Known Problems Paternal Grandfather     No Known Problems Sister     No Known Problems Paternal Aunt     No Known Problems Paternal Aunt     No Known Problems Paternal Aunt      I have reviewed and agree with the history as documented  E-Cigarette/Vaping    E-Cigarette Use Never User      E-Cigarette/Vaping Substances    Nicotine No     THC No     CBD No     Flavoring No     Other No     Unknown No      Social History     Tobacco Use    Smoking status: Never Smoker    Smokeless tobacco: Never Used   Vaping Use    Vaping Use: Never used   Substance Use Topics    Alcohol use: Yes     Comment: occ    Drug use: No       Review of Systems   Constitutional: Positive for chills, fatigue and fever  Respiratory: Positive for cough and shortness of breath  All other systems reviewed and are negative  Physical Exam  Physical Exam  Vitals and nursing note reviewed  Constitutional:       General: She is not in acute distress  Appearance: Normal appearance  She is well-developed and normal weight  She is ill-appearing  She is not toxic-appearing or diaphoretic  HENT:      Head: Normocephalic and atraumatic  Right Ear: External ear normal       Left Ear: External ear normal       Nose: Nose normal       Mouth/Throat:      Mouth: Mucous membranes are moist       Pharynx: Oropharynx is clear  Eyes:      Conjunctiva/sclera: Conjunctivae normal    Cardiovascular:      Rate and Rhythm: Regular rhythm  Tachycardia present  Pulses: Normal pulses  Heart sounds: Normal heart sounds  Pulmonary:      Effort: Pulmonary effort is normal  Tachypnea present  Breath sounds: Normal breath sounds  Abdominal:      General: Abdomen is flat  Bowel sounds are normal  There is no distension or abdominal bruit  There are no signs of injury  Palpations: Abdomen is soft  There is no shifting dullness  Tenderness: There is no abdominal tenderness  Genitourinary:     Adnexa: Right adnexa normal and left adnexa normal    Musculoskeletal:         General: Normal range of motion  Cervical back: Normal range of motion and neck supple  Right lower leg: No tenderness  No edema  Left lower leg: No tenderness  No edema  Skin:     General: Skin is warm and dry  Capillary Refill: Capillary refill takes less than 2 seconds  Neurological:      General: No focal deficit present  Mental Status: She is alert and oriented to person, place, and time  Mental status is at baseline     Psychiatric:         Mood and Affect: Mood normal          Behavior: Behavior normal          Vital Signs  ED Triage Vitals [05/15/22 1731]   Temperature Pulse Respirations Blood Pressure SpO2   98 5 °F (36 9 °C) (!) 125 22 153/96 98 %      Temp Source Heart Rate Source Patient Position - Orthostatic VS BP Location FiO2 (%)   Oral Monitor Sitting Left arm --      Pain Score       --           Vitals:    05/15/22 1731 05/15/22 1800 05/15/22 2015 05/15/22 2225   BP: 153/96 135/85 129/87 127/92   Pulse: (!) 125 (!) 110 86 83   Patient Position - Orthostatic VS: Sitting Sitting Sitting Sitting         Visual Acuity      ED Medications  Medications   sodium chloride 0 9 % bolus 1,000 mL (0 mL Intravenous Stopped 5/15/22 1912)   acetaminophen (TYLENOL) tablet 975 mg (975 mg Oral Given 5/15/22 1813)   ketorolac (TORADOL) injection 15 mg (15 mg Intravenous Given 5/15/22 1812)   iohexol (OMNIPAQUE) 350 MG/ML injection (SINGLE-DOSE) 65 mL (65 mL Intravenous Given 5/15/22 2117)       Diagnostic Studies  Results Reviewed     Procedure Component Value Units Date/Time    HS Troponin I 4hr [[id number]] Collected: 05/15/22 2225    Lab Status: Final result Specimen: Blood from Arm, Left Updated: 05/15/22 2301     hs TnI 4hr <2 ng/L      Delta 4hr hsTnI --    HS Troponin I 2hr [[id number]] Collected: 05/15/22 2021    Lab Status: Final result Specimen: Blood from Arm, Left Updated: 05/15/22 2106     hs TnI 2hr <2 ng/L      Delta 2hr hsTnI --    Lactic acid 2 Hours [[id number]]  (Normal) Collected: 05/15/22 2021    Lab Status: Final result Specimen: Blood from Arm, Left Updated: 05/15/22 2059     LACTIC ACID 1 1 mmol/L     Narrative:      Result may be elevated if tourniquet was used during collection  B-Type Natriuretic Peptide(BNP) AN, CA, EA Campuses Only [[id number]]  (Normal) Collected: 05/15/22 1751    Lab Status: Final result Specimen: Blood from Arm, Left Updated: 05/15/22 1930     BNP 9 pg/mL     Lactic acid [[id number]]  (Abnormal) Collected: 05/15/22 1751    Lab Status: Final result Specimen: Blood from Arm, Left Updated: 05/15/22 1927     LACTIC ACID 2 3 mmol/L     Narrative:      Result may be elevated if tourniquet was used during collection      POCT pregnancy, urine [[id number]]  (Normal) Resulted: 05/15/22 1800    Lab Status: Final result Updated: 05/15/22 1927     EXT PREG TEST UR (Ref: Negative) Negative     Control Valid    D-Dimer [[id number]]  (Abnormal) Collected: 05/15/22 1751    Lab Status: Final result Specimen: Blood from Arm, Left Updated: 05/15/22 1925     D-Dimer, Quant 1 17 ug/ml FEU     Comprehensive metabolic panel [[id number]] Collected: 05/15/22 1751    Lab Status: Final result Specimen: Blood from Arm, Left Updated: 05/15/22 1917     Sodium 138 mmol/L      Potassium 4 5 mmol/L      Chloride 103 mmol/L      CO2 24 mmol/L      ANION GAP 11 mmol/L      BUN 8 mg/dL      Creatinine 0 68 mg/dL      Glucose 93 mg/dL      Calcium 9 1 mg/dL      AST 23 U/L      ALT 12 U/L      Alkaline Phosphatase 89 U/L      Total Protein 7 6 g/dL      Albumin 3 9 g/dL      Total Bilirubin 0 58 mg/dL      eGFR 105 ml/min/1 73sq m     Narrative:      Rich guidelines for Chronic Kidney Disease (CKD):     Stage 1 with normal or high GFR (GFR > 90 mL/min/1 73 square meters)    Stage 2 Mild CKD (GFR = 60-89 mL/min/1 73 square meters)    Stage 3A Moderate CKD (GFR = 45-59 mL/min/1 73 square meters)    Stage 3B Moderate CKD (GFR = 30-44 mL/min/1 73 square meters)    Stage 4 Severe CKD (GFR = 15-29 mL/min/1 73 square meters)    Stage 5 End Stage CKD (GFR <15 mL/min/1 73 square meters)  Note: GFR calculation is accurate only with a steady state creatinine    Magnesium [[id number]]  (Abnormal) Collected: 05/15/22 1751    Lab Status: Final result Specimen: Blood from Arm, Left Updated: 05/15/22 1917     Magnesium 1 8 mg/dL     HS Troponin 0hr (reflex protocol) [[id number]]  (Normal) Collected: 05/15/22 1751    Lab Status: Final result Specimen: Blood from Arm, Left Updated: 05/15/22 1914     hs TnI 0hr <2 ng/L     COVID/FLU/RSV - 2 hour TAT [[id number]]  (Abnormal) Collected: 05/15/22 1751    Lab Status: Final result Specimen: Nares from Nose Updated: 05/15/22 1910     SARS-CoV-2 Positive     INFLUENZA A PCR Negative     INFLUENZA B PCR Negative     RSV PCR Negative    Narrative:      FOR PEDIATRIC PATIENTS - copy/paste COVID Guidelines URL to browser: [email or web address]  [email or web address]    SARS-CoV-2 assay is a Nucleic Acid Amplification assay intended for the  qualitative detection of nucleic acid from SARS-CoV-2 in nasopharyngeal  swabs  Results are for the presumptive identification of SARS-CoV-2 RNA  Positive results are indicative of infection with SARS-CoV-2, the virus  causing COVID-19, but do not rule out bacterial infection or co-infection  with other viruses  Laboratories within the [place] and its  territories are required to report all positive results to the appropriate  public health authorities  Negative results do not preclude SARS-CoV-2  infection and should not be used as the sole basis for treatment or other  patient management decisions  Negative results must be combined with  clinical observations, patient history, and epidemiological information  This test has not been FDA cleared or approved  This test has been authorized by FDA under an Emergency Use Authorization  (EUA)   This test is only authorized for the duration of time the  declaration that circumstances exist justifying the authorization of the  emergency use of an in vitro diagnostic tests for detection of SARS-CoV-2  virus and/or diagnosis of COVID-19 infection under section 564(b)(1) of  the Act, 21 U  [place] C  676ADZ-0(X)(2), unless the authorization is terminated  or revoked sooner  The test has been validated but independent review by FDA  and CLIA is pending  Test performed using [employer] GeneXpert: This RT-PCR assay targets N2,  a region unique to SARS-CoV-2   A conserved region in the E-gene was chosen  for pan-Sarbecovirus detection which includes SARS-CoV-2     UA (URINE) with reflex to Scope [[id number]] Collected: 05/15/22 1831    Lab Status: Final result Specimen: Urine, Clean Catch Updated: 05/15/22 1842     Color, UA Yellow     Clarity, UA Slightly Cloudy     Specific Gravity, UA 1 015     pH, UA 8 0     Leukocytes, UA Negative     Nitrite, UA Negative     Protein, UA Negative mg/dl      Glucose, UA Negative mg/dl      Ketones, UA Negative mg/dl      Urobilinogen, UA 1 0 E U /dl      Bilirubin, UA Negative     Blood, UA Negative    CBC and differential [[id number]]  (Abnormal) Collected: 05/15/22 1751    Lab Status: Final result Specimen: Blood from Arm, Left Updated: 05/15/22 1828     WBC 6 43 Thousand/uL      RBC 4 89 Million/uL      Hemoglobin 14 8 g/dL      Hematocrit 45 4 %      MCV 93 fL      MCH 30 3 pg      MCHC 32 6 g/dL      RDW 13 2 %      MPV 10 9 fL      Platelets 138 Thousands/uL      nRBC 0 /100 WBCs      Neutrophils Relative 40 %      Immat GRANS % 0 %      Lymphocytes Relative 46 %      Monocytes Relative 11 %      Eosinophils Relative 2 %      Basophils Relative 1 %      Neutrophils Absolute 2 56 Thousands/µL      Immature Grans Absolute 0 02 Thousand/uL      Lymphocytes Absolute 2 97 Thousands/µL      Monocytes Absolute 0 70 Thousand/µL      Eosinophils Absolute 0 14 Thousand/µL      Basophils Absolute 0 04 Thousands/µL                  CTA ED chest PE study   Final Result by Leopoldo Graham, DO (05/15 2218)      No evidence of pulmonary artery embolus                  Workstation performed: [id number]         XR chest 1 view portable    (Results Pending)              Procedures  ECG 12 Lead Documentation Only    Date/Time: 5/15/2022 6:05 PM  Performed by: Freddie Ovalle DO  Authorized by: Freddie Ovalle DO     Indications / Diagnosis:  Sob  ECG reviewed by me, the ED Provider: yes    Patient location:  ED  Previous ECG:     Previous ECG:  Compared to current    Similarity:  No change    Comparison to cardiac monitor: Yes    Interpretation:     Interpretation: non-specific    Rate:     ECG rate:  104    ECG rate assessment: tachycardic    Rhythm:     Rhythm: sinus tachycardia    Ectopy:     Ectopy: none    QRS:     QRS axis:  Normal    QRS intervals:  Normal  Conduction:     Conduction: normal    ST segments:     ST segments:  Normal  T waves:     T waves: normal               ED Course  ED Course as of 05/16/22 0113   Sun May 15, 2022   2033 D-dimer elevated at 1 17 cta PE study ordered                               SBIRT 22yo+    Flowsheet Row Most Recent Value   SBIRT (25 yo +)    In order to provide better care to our patients, we are screening all of our patients for alcohol and drug use  Would it be okay to ask you these screening questions? Unable to answer at this time Filed at: 05/15/2022 1750                    MDM  Number of Diagnoses or Management Options  COVID-19: new and requires workup  Dyspnea: new and requires workup  Diagnosis management comments: 71-year-old female with COVID-19  CT a chest negative for PE, negative for pneumonia  Vitals all stable  She does appear mildly dehydrated, IV fluids and feels better  Stable for discharge home, started on antiviral for COVID-19, paxlovid  RTER precautions given         Amount and/or Complexity of Data Reviewed  Clinical lab tests: ordered and reviewed  Tests in the radiology section of CPT®: ordered and reviewed    Risk of Complications, Morbidity, and/or Mortality  Presenting problems: moderate  Diagnostic procedures: moderate  Management options: moderate    Patient Progress  Patient progress: stable      Disposition  Final diagnoses:   COVID-19   Dyspnea     Time reflects when diagnosis was documented in both MDM as applicable and the Disposition within this note     Time User Action Codes Description Comment    5/15/2022 10:25 PM Deri Lek Add [U07 1] COVID-19     5/15/2022 10:25 PM Deri Lek Add [R06 00] Dyspnea     5/15/2022 10:25 PM Deri Lek Add [J45 20] Mild intermittent asthma without complication     0/49/6995 10:27 PM Lara Latus [J45 20] Mild intermittent asthma without complication     4/59/8525 10:28 PM Lara Latus [J45 20] Mild intermittent asthma without complication       ED Disposition     ED Disposition   Discharge    Condition   Stable    Date/Time   Sun May 15, 2022 10:25 PM    Comment   Ellie Cross discharge to home/self care  Follow-up Information     Follow up With Specialties Details Why [address], DO Family Medicine In 3 days  [address] [place] [address]   [phone]            Discharge Medication List as of 5/15/2022 10:28 PM      START taking these medications    Details   nirmatrelvir & ritonavir (Paxlovid) tablet therapy pack Take 3 tablets by mouth in the morning and 3 tablets in the evening  Do all this for 5 days   Take 2 nirmatrelvir tablets + 1 ritonavir tablet together per dose , Starting Sun 5/15/2022, Until Fri 5/20/2022, Normal         CONTINUE these medications which have CHANGED    Details   albuterol (ProAir HFA) 90 mcg/act inhaler Inhale 2 puffs every 6 (six) hours as needed for wheezing, Starting Sun 5/15/2022, Normal         CONTINUE these medications which have NOT CHANGED    Details   acetaminophen (TYLENOL) 500 mg tablet Take 2 tablets (1,000 mg total) by mouth every 6 (six) hours as needed for mild pain or moderate pain, Starting Wed 2/16/2022, Normal      ALPRAZolam (XANAX) 0 5 mg tablet Take 1 tablet (0 5 mg total) by mouth daily as needed for anxiety, Starting Tue 7/20/2021, Normal      Cholecalciferol (Vitamin D3) 1 25 MG (56960 UT) CAPS Starting Thu 1/20/2022, Historical Med      Cholecalciferol (Vitamin D3) 1 25 MG (79788 UT) TABS Take 1 Tablet once a week, Normal      ergocalciferol (VITAMIN D2) 50,000 units Starting Tue 7/20/2021, Historical Med      ibuprofen (MOTRIN) 600 mg tablet Take 1 tablet (600 mg total) by mouth every 6 (six) hours as needed for mild pain or moderate pain, Starting Wed 2/16/2022, Normal      levothyroxine 125 mcg tablet Take 1 tablet (125 mcg total) by mouth daily, Starting Tue 11/9/2021, Normal      valACYclovir (VALTREX) 1,000 mg tablet Take 1 tablet (1,000 mg total) by mouth 2 (two) times a day for 5 days, Starting Tue 8/17/2021, Until Thu 1/20/2022, Normal      ZOLMitriptan (ZOMIG) 5 MG tablet Take 1 tablet (5 mg total) by mouth once as needed for migraine for up to 1 dose, Starting Tue 7/20/2021, Normal             No discharge procedures on file      PDMP Review       Value Time User    PDMP Reviewed  Yes 2/16/2022  9:57 AM Jose Fernando MD          ED Provider  Electronically Signed by           Stanley Aguilar DO  05/16/22 9330

## 2022-05-16 RX ORDER — ALBUTEROL SULFATE 90 UG/1
2 AEROSOL, METERED RESPIRATORY (INHALATION) EVERY 6 HOURS PRN
Qty: 8.5 G | Refills: 0 | Status: SHIPPED | OUTPATIENT
Start: 2022-05-16

## 2022-05-16 RX ORDER — LEVOTHYROXINE SODIUM 0.12 MG/1
125 TABLET ORAL DAILY
Qty: 90 TABLET | Refills: 0 | Status: SHIPPED | OUTPATIENT
Start: 2022-05-16 | End: 2022-07-26 | Stop reason: SDUPTHER

## 2022-05-16 RX ORDER — VALACYCLOVIR HYDROCHLORIDE 1 G/1
1000 TABLET, FILM COATED ORAL 2 TIMES DAILY
Qty: 10 TABLET | Refills: 0 | Status: SHIPPED | OUTPATIENT
Start: 2022-05-16 | End: 2022-07-26

## 2022-05-25 ENCOUNTER — PROCEDURE VISIT (OUTPATIENT)
Dept: OBGYN CLINIC | Facility: OTHER | Age: 47
End: 2022-05-25
Payer: COMMERCIAL

## 2022-05-25 VITALS
DIASTOLIC BLOOD PRESSURE: 81 MMHG | WEIGHT: 192.3 LBS | HEART RATE: 90 BPM | SYSTOLIC BLOOD PRESSURE: 115 MMHG | HEIGHT: 68 IN | BODY MASS INDEX: 29.15 KG/M2

## 2022-05-25 DIAGNOSIS — M17.11 PRIMARY OSTEOARTHRITIS OF RIGHT KNEE: ICD-10-CM

## 2022-05-25 DIAGNOSIS — M25.561 RIGHT KNEE PAIN, UNSPECIFIED CHRONICITY: ICD-10-CM

## 2022-05-25 DIAGNOSIS — M17.11 PATELLOFEMORAL ARTHRITIS OF RIGHT KNEE: Primary | ICD-10-CM

## 2022-05-25 PROCEDURE — 20611 DRAIN/INJ JOINT/BURSA W/US: CPT | Performed by: STUDENT IN AN ORGANIZED HEALTH CARE EDUCATION/TRAINING PROGRAM

## 2022-05-25 RX ORDER — LIDOCAINE HYDROCHLORIDE 10 MG/ML
5 INJECTION, SOLUTION INFILTRATION; PERINEURAL
Status: COMPLETED | OUTPATIENT
Start: 2022-05-25 | End: 2022-05-25

## 2022-05-25 RX ADMIN — LIDOCAINE HYDROCHLORIDE 5 ML: 10 INJECTION, SOLUTION INFILTRATION; PERINEURAL at 08:42

## 2022-05-25 NOTE — PROGRESS NOTES
Large joint arthrocentesis: R knee  [place] Protocol:  Procedure performed by: (Dr Monserrat Christie)  Consent: Verbal consent obtained  Risks and benefits: risks, benefits and alternatives were discussed  Consent given by: patient  Patient understanding: patient states understanding of the procedure being performed  Site marked: the operative site was marked  Radiology Images displayed and confirmed   If images not available, report reviewed: imaging studies available  Patient identity confirmed: verbally with patient    Supporting Documentation  Indications: pain   Procedure Details  Location: knee - R knee  Preparation: Patient was prepped and draped in the usual sterile fashion  Needle size: 18 G  Ultrasound guidance: yes  Approach: lateral  Medications administered: 5 mL lidocaine 1 %; 3 mL sodium hyaluronate 60 MG/3ML (5 ml of lidocaine using 25 gauge 3 5 inch needle, intra-articular visco injection with 18 gauge 3 5 inch needle )    Patient tolerance: patient tolerated the procedure well with no immediate complications  Dressing:  Sterile dressing applied

## 2022-05-25 NOTE — PATIENT INSTRUCTIONS

## 2022-05-26 ENCOUNTER — CLINICAL SUPPORT (OUTPATIENT)
Dept: FAMILY MEDICINE CLINIC | Facility: CLINIC | Age: 47
End: 2022-05-26
Payer: COMMERCIAL

## 2022-05-26 ENCOUNTER — ANNUAL EXAM (OUTPATIENT)
Dept: OBGYN CLINIC | Facility: CLINIC | Age: 47
End: 2022-05-26
Payer: COMMERCIAL

## 2022-05-26 VITALS
BODY MASS INDEX: 29.1 KG/M2 | HEIGHT: 68 IN | DIASTOLIC BLOOD PRESSURE: 70 MMHG | SYSTOLIC BLOOD PRESSURE: 102 MMHG | WEIGHT: 192 LBS

## 2022-05-26 DIAGNOSIS — Z01.419 WOMEN'S ANNUAL ROUTINE GYNECOLOGICAL EXAMINATION: Primary | ICD-10-CM

## 2022-05-26 DIAGNOSIS — L65.9 HAIR LOSS: ICD-10-CM

## 2022-05-26 DIAGNOSIS — R23.2 HOT FLASHES: ICD-10-CM

## 2022-05-26 DIAGNOSIS — E53.8 B12 DEFICIENCY: Primary | ICD-10-CM

## 2022-05-26 DIAGNOSIS — G47.00 INSOMNIA, UNSPECIFIED TYPE: ICD-10-CM

## 2022-05-26 PROCEDURE — S0612 ANNUAL GYNECOLOGICAL EXAMINA: HCPCS | Performed by: STUDENT IN AN ORGANIZED HEALTH CARE EDUCATION/TRAINING PROGRAM

## 2022-05-26 PROCEDURE — 96372 THER/PROPH/DIAG INJ SC/IM: CPT

## 2022-05-26 PROCEDURE — G0145 SCR C/V CYTO,THINLAYER,RESCR: HCPCS | Performed by: STUDENT IN AN ORGANIZED HEALTH CARE EDUCATION/TRAINING PROGRAM

## 2022-05-26 PROCEDURE — G0476 HPV COMBO ASSAY CA SCREEN: HCPCS | Performed by: STUDENT IN AN ORGANIZED HEALTH CARE EDUCATION/TRAINING PROGRAM

## 2022-05-26 RX ADMIN — CYANOCOBALAMIN 1000 MCG: 1000 INJECTION INTRAMUSCULAR; SUBCUTANEOUS at 14:08

## 2022-05-26 NOTE — PROGRESS NOTES
[facility]   Annual Well Woman Exam  [place]    ASSESSMENT & PLAN: Delmon Spurling is a 40 y o  Kacey Cruz with normal gynecologic exam     1   Routine well woman exam done today  2   Pap and HPV: Pap with HPV was done today  (hx of AGC, benign colpo and endobx, subsequently normal papx4, ASCCP Guidelines reviewed, desires one more yearly pap today with plan to space out q3 years if normal after this one)  3  Mammogram ordered  Recommend yearly mammography per ACOG guidelines  4   The patient declined STD testing--monogamous, stable relationship  5  The patient is sexually active  She uses [facility] for contraception  6  The following were reviewed in today's visit: breast self exam, exercise, healthy diet, colon cancer screening (has GI referral in, will call to schedule), hormonal changes perhaps c/w menopause (THS, FSH, Testosterone ordered today--T per Ellie's request), reviewed return of monthly periods lighter than prior to ablation--continue to monitor  7  Patient to return to office in 12 months for annual, sooner PRN  All questions answered to the best of my ability  Subjective:    CC:  Annual Gynecologic Examination    HPI: Delmon Spurling is a 40 y o  Katerineee Anthony who presents for annual gynecologic examination  She has the following concerns:      GYN  Complaints: periods have returned since ablation, but not as prior to ablation  No weird discharge  No dyspareunia  No pain at incision    Denies dyspareunia, genital discharge, pelvic pain and vulvar/vaginal symptoms  S/p endometrial ablation and [facility]   Menstrual cycles are regular, occurring every 28-30 days and lasting 6-7 days  Some pain in right lower quadrant pain with period  She does report hot flashes, night sweats, insomnia, hair loss      Sexually active: same partner  Birth control: s/p [facility]   Hx STI: denies, declines testing today  Last Pap:   5/2021: NILM, HPV neg   03/2020: NILM< HPV neg   03/2019: NILM   03/2018: NILM, HPV neg   2017: AGC, HPV neg--> colpo benign, endobx benign     OB       G/U  Complaints: denies  Denies hematuria, urinary incontinence and dysuria    Breast  Complaints: denies  Denies: breast lump, breast tenderness, nipple discharge and skin color change  Family hx: denies fhx of breast, uterine, ovarian, or colon cancers  Patient does do regular self-exams    Health Maintenance:    She does follow with a PCP  She exercises: difficult with knee pain, likes jogging, but limited--walking BID, working on [employer]    She wears her seatbelt routinely  She does practice breast self awareness  She does follow a well balanced diet--> appetite changed with recent covid infection--plenty of fruits and veggies,   She does not use tobacco     Past Medical History:   Diagnosis Date    Arthritis     Asthma     Breast lump     resolved [id number]    Depression     Disease of thyroid gland     Hair loss     Hemorrhage of conjunctiva, left     last assessed [id number]    Knee joint pain     right    Lyme disease     lastt assessed [id number]    Migraine     Thyroid disease        Past Surgical History:   Procedure Laterality Date    LUMBAR FUSION  2004    Lumbar Vertebral Fusion;      MN HYSTEROSCOPY,W/ENDO BX N/A 2022    Procedure: DILATATION AND CURETTAGE (D&C) WITH HYSTEROSCOPY;  Surgeon: Inocencia Vidal MD;  Location: [facility];  Service: Gynecology    MN HYSTEROSCOPY,W/ENDOMETRIAL ABLATION N/A 2022    Procedure: ABLATION ENDOMETRIAL FREDDY;  Surgeon: Inocencia Vidal MD;  Location: [facility];  Service: Gynecology    MN LAP,RMV  ADNEXAL STRUCTURE Bilateral 2022    Procedure: SALPINGECTOMY, LAPAROSCOPIC;  Surgeon: Inocencia Vidal MD;  Location: [facility];  Service: Gynecology    WISDOM TOOTH EXTRACTION         Past OB/Gyn History:     Patient's last menstrual period was 2022 (approximate)      Family History:  Family History   Problem Relation Age of Onset    Atrial fibrillation Mother     Hyperlipidemia Mother     Hypertension Mother     Supraventricular tachycardia Mother     Heart disease Mother     Diabetes Father             Hyperlipidemia Father     Hypertension Father             Arthritis Maternal Grandmother     Leukemia Maternal Grandmother     Cancer Maternal Grandmother         blood ca    Stroke Maternal Grandfather             Pancreatic cancer Maternal Grandfather 72    No Known Problems Sister     No Known Problems Paternal Grandmother     No Known Problems Paternal Grandfather     No Known Problems Sister     No Known Problems Paternal Aunt     No Known Problems Paternal Aunt     No Known Problems Paternal Aunt        Social History:  Social History     Socioeconomic History    Marital status:      Spouse name: Not on file    Number of children: Not on file    Years of education: Not on file    Highest education level: Not on file   Occupational History    Not on file   Tobacco Use    Smoking status: Never Smoker    Smokeless tobacco: Never Used   Vaping Use    Vaping Use: Never used   Substance and Sexual Activity    Alcohol use: Not Currently     Comment: occ    Drug use: No    Sexual activity: Yes     Partners: Male   Other Topics Concern    Not on file   Social History Narrative    Caffeine use     Social Determinants of Health     Financial Resource Strain: Not on file   Food Insecurity: Not on file   Transportation Needs: Not on file   Physical Activity: Not on file   Stress: Not on file   Social Connections: Not on file   Intimate Partner Violence: Not on file   Housing Stability: Not on file       Allergies   Allergen Reactions    Pollen Extract        Current Outpatient Medications:     acetaminophen (TYLENOL) 500 mg tablet, Take 2 tablets (1,000 mg total) by mouth every 6 (six) hours as needed for mild pain or moderate pain, Disp: 30 tablet, Rfl: 0   albuterol (ProAir HFA) 90 mcg/act inhaler, Inhale 2 puffs every 6 (six) hours as needed for wheezing, Disp: 8 5 g, Rfl: 0    ALPRAZolam (XANAX) 0 5 mg tablet, Take 1 tablet (0 5 mg total) by mouth daily as needed for anxiety, Disp: 30 tablet, Rfl: 0    Cholecalciferol (Vitamin D3) 1 25 MG (97663 UT) CAPS, , Disp: , Rfl:     ibuprofen (MOTRIN) 600 mg tablet, Take 1 tablet (600 mg total) by mouth every 6 (six) hours as needed for mild pain or moderate pain, Disp: 30 tablet, Rfl: 0    levothyroxine 125 mcg tablet, Take 1 tablet (125 mcg total) by mouth in the morning , Disp: 90 tablet, Rfl: 0    ZOLMitriptan (ZOMIG) 5 MG tablet, Take 1 tablet (5 mg total) by mouth once as needed for migraine for up to 1 dose, Disp: 6 tablet, Rfl: 0    Cholecalciferol (Vitamin D3) 1 25 MG (22377 UT) TABS, Take 1 Tablet once a week, Disp: 8 tablet, Rfl: 3    ergocalciferol (VITAMIN D2) 50,000 units, , Disp: , Rfl:     valACYclovir (VALTREX) 1,000 mg tablet, Take 1 tablet (1,000 mg total) by mouth in the morning and 1 tablet (1,000 mg total) in the evening  Do all this for 5 days  , Disp: 10 tablet, Rfl: 0    Current Facility-Administered Medications:     cyanocobalamin injection 1,000 mcg, 1,000 mcg, Intramuscular, Q30 Days, Mona Coley DO, 1,000 mcg at 02/24/22 1426    Review of Systems:  Denies fevers, chills, unintentional weight loss, excessive fatigue, chest pain, shortness of breath, abdominal pain, nausea, vomiting, urinary incontinence, urinary frequency, vaginal bleeding, vaginal discharge  All other systems negative unless otherwise stated  Physical Exam:  /70 (BP Location: Right arm, Patient Position: Sitting, Cuff Size: Extra-Large)   Ht 5' 8" (1 727 m)   Wt 87 1 kg (192 lb)   LMP 05/12/2022 (Approximate)   BMI 29 19 kg/m²  Body mass index is 29 19 kg/m²  GEN: The patient was alert and oriented x3, pleasant well-appearing female in no acute distress     HEENT:  Unremarkable, no anterior or posterior lymphadenopathy, no thyromegaly  CV:  Regular rate and rhythm, normal S1 and S2, no murmurs  RESP:  Clear to auscultation bilaterally, no wheezes, rales or rhonchi  BREAST:  Symmetric breasts with no palpable breast masses or obvious breast lesions  She has no retractions or nipple discharge  She has no axillary abnormalities or palpable masses  GI:  Soft, nontender, non-distended  MSK: bilateral lower extremities are nontender, no edema  : Normal appearing external female genitalia, normal appearing urethral meatus  On sterile speculum exam, normal appearing vaginal epithelium, no vaginal discharge, no bleeding, grossly normal appearing cervix  On bimanual exam, no cervical motion tenderness; uterus is smooth, mobile, non-tender, normal size  No tenderness or fullness in the bilateral adnexa       NADER: no masses, normal tone, neg FOBT

## 2022-05-26 NOTE — PATIENT INSTRUCTIONS
Menopause    Menopause requires no medical treatment  Instead, treatments focus on relieving your signs and symptoms and preventing or managing chronic conditions that may occur with aging  Treatments may include:  Hormone therapy  Estrogen therapy is the most effective treatment option for relieving menopausal hot flashes  Depending on your personal and family medical history, your doctor may recommend estrogen in the lowest dose and the shortest time frame needed to provide symptom relief for you  If you still have your uterus, you'll need progestin in addition to estrogen  Estrogen also helps prevent bone loss  Long-term use of hormone therapy may have some cardiovascular and breast cancer risks, but starting hormones around the time of menopause has shown benefits for some women  Talk to your doctor about the benefits and risks of hormone therapy and whether it's a safe choice for you  Vaginal estrogen  To relieve vaginal dryness, estrogen can be administered directly to the vagina using a vaginal cream, tablet or ring  This treatment releases just a small amount of estrogen, which is absorbed by the vaginal tissues  It can help relieve vaginal dryness, discomfort with intercourse and some urinary symptoms  Low-dose antidepressants  Certain antidepressants related to the class of drugs called selective serotonin reuptake inhibitors (SSRIs) may decrease menopausal hot flashes  A low-dose antidepressant for management of hot flashes may be useful for women who can't take estrogen for health reasons or for women who need an antidepressant for a mood disorder  Gabapentin (Gralise, Horizant, Neurontin)  Gabapentin is approved to treat seizures, but it has also been shown to help reduce hot flashes  This drug is useful in women who can't use estrogen therapy and in those who also have nighttime hot flashes  Clonidine (Catapres, Kapvay)   Clonidine, a pill or patch typically used to treat high blood pressure, might provide some relief from hot flashes  Medications to prevent or treat osteoporosis  Depending on individual needs, doctors may recommend medication to prevent or treat osteoporosis  Several medications are available that help reduce bone loss and risk of fractures  Your doctor might prescribe vitamin D supplements to help strengthen bones  Before deciding on any form of treatment, talk with your doctor about your options and the risks and benefits involved with each  Review your options yearly, as your needs and treatment options may change  Lifestyle and home remedies  Fortunately, many of the signs and symptoms associated with menopause are temporary  Take these steps to help reduce or prevent their effects:  Cool hot flashes  Dress in layers, have a cold glass of water or go somewhere cooler  Try to pinpoint what triggers your hot flashes  For many women, triggers may include hot beverages, caffeine, spicy foods, alcohol, stress, hot weather and even a warm room  Decrease vaginal discomfort  Try an over-the-counter, water-based vaginal lubricant (Astroglide, K-Y jelly, Sliquid, others) or a silicone-based lubricant or moisturizer (Replens, K-Y Liquibeads, Sliquid, others)  You might consider choosing a product that doesn't contain glycerin, which can cause burning or irritation if you're sensitive to that chemical  Staying sexually active also helps with vaginal discomfort by increasing blood flow to the vagina  Get enough sleep  Avoid caffeine, which can make it hard to get to sleep, and avoid drinking too much alcohol, which can interrupt sleep  Exercise during the day, although not right before bedtime  If hot flashes disturb your sleep, you may need to find a way to manage them before you can get adequate rest    Practice relaxation techniques  Techniques such as deep breathing, paced breathing, guided imagery, massage and progressive muscle relaxation may help with menopausal symptoms   You can find a number of books and online offerings that show different relaxation exercises  Strengthen your pelvic floor  Pelvic floor muscle exercises, called Kegel exercises, can improve some forms of urinary incontinence  Eat a balanced diet  Include a variety of fruits, vegetables and whole grains  Limit saturated fats, oils and sugars  Ask your provider if you need calcium or vitamin D supplements to help meet daily requirements  Don't smoke  Smoking increases your risk of heart disease, stroke, osteoporosis, cancer and a range of other health problems  It may also increase hot flashes and bring on earlier menopause  Exercise regularly  Get regular physical activity or exercise on most days to help protect against heart disease, diabetes, osteoporosis and other conditions associated with aging  Alternative medicine  Many approaches have been promoted as aids in managing the symptoms of menopause, but few of them have scientific evidence to back up the claims  Some complementary and alternative treatments that have been or are being studied include:  Plant estrogens (phytoestrogens)  These estrogens occur naturally in certain foods  There are two main types of phytoestrogens -- isoflavones and lignans  Isoflavones are found in soybeans, lentils, chickpeas and other legumes  Lignans occur in flaxseed, whole grains, and some fruits and vegetables  Whether the estrogens in these foods can relieve hot flashes and other menopausal symptoms remains to be proved, but most studies have found them ineffective  Isoflavones have some weak estrogen-like effects, so if you've had breast cancer, talk to your doctor before supplementing your diet with isoflavone pills  The herb gray is thought to contain compounds with estrogen-like effects, and there's good evidence that it can effectively manage menopause symptoms   The herb and its oils should be avoided in people who have an allergy to gray, and in pregnant or breast-feeding women  Use carefully in people with high blood pressure or epilepsy  Bioidentical hormones  These hormones come from plant sources  The term "bioidentical" implies the hormones in the product are chemically identical to those your body produces  There are some commercially available bioidentical hormones approved by the Food and Drug Administration (FDA)  But many preparations are compounded -- mixed in a pharmacy according to a doctor's prescription -- and aren't regulated by the FDA, so quality and risks could vary  There's no scientific evidence that bioidentical hormones work any better than traditional hormone therapy in easing menopause symptoms  There's also no evidence that they're any less risky than traditional hormone therapy  Black cohosh  Black cohosh has been popular among many women with menopausal symptoms  But there's little evidence that black cohosh is effective, and the supplement can be harmful to the liver and may be unsafe for women with a history of breast cancer  Yoga  There's no evidence to support the practice of yoga in reducing menopausal symptoms  But balance exercises such as yoga or sarah chi can improve strength and coordination and may help prevent falls that could lead to broken bones  Check with your doctor before starting balance exercises  Consider taking a class to learn how to perform postures and proper breathing techniques  Acupuncture  Acupuncture may have some temporary benefit in helping to reduce hot flashes, but research hasn't shown significant or consistent improvements  More research is needed  Hypnosis  Hypnotherapy may decrease the incidence of hot flashes for some menopausal women, according to research from the [facility] and [address] [place] [address] at the [address]  Hypnotherapy also helped improve sleep and decreased interference in daily life, according to the study      You may have heard of or tried other dietary supplements, such as red clover, kava, dong quai, DHEA, evening primrose oil and wild yam (natural progesterone cream)  Scientific evidence on effectiveness is lacking, and some of these products may be harmful  Talk with your doctor before taking any herbal or dietary supplements for menopausal symptoms  The FDA does not regulate herbal products, and some can be dangerous or interact with other medications you take, putting your health at risk

## 2022-05-26 NOTE — PROGRESS NOTES
Assessment/Plan:    No problem-specific Assessment & Plan notes found for this encounter  Diagnoses and all orders for this visit:    B12 deficiency          Subjective:      Patient ID: Toi Grijalva is a 40 y o  female      HPI        Review of Systems      Objective:      LMP 05/12/2022 (Approximate)          Physical Exam

## 2022-05-27 ENCOUNTER — APPOINTMENT (OUTPATIENT)
Dept: LAB | Facility: AMBULARY SURGERY CENTER | Age: 47
End: 2022-05-27
Payer: COMMERCIAL

## 2022-05-27 DIAGNOSIS — G47.00 INSOMNIA, UNSPECIFIED TYPE: ICD-10-CM

## 2022-05-27 DIAGNOSIS — E03.9 ACQUIRED HYPOTHYROIDISM: ICD-10-CM

## 2022-05-27 DIAGNOSIS — Z13.220 ENCOUNTER FOR LIPID SCREENING FOR CARDIOVASCULAR DISEASE: ICD-10-CM

## 2022-05-27 DIAGNOSIS — R23.2 HOT FLASHES: ICD-10-CM

## 2022-05-27 DIAGNOSIS — Z13.1 SCREENING FOR DIABETES MELLITUS: ICD-10-CM

## 2022-05-27 DIAGNOSIS — L65.9 HAIR LOSS: ICD-10-CM

## 2022-05-27 DIAGNOSIS — G43.909 MIGRAINE WITHOUT STATUS MIGRAINOSUS, NOT INTRACTABLE, UNSPECIFIED MIGRAINE TYPE: ICD-10-CM

## 2022-05-27 DIAGNOSIS — Z13.6 ENCOUNTER FOR LIPID SCREENING FOR CARDIOVASCULAR DISEASE: ICD-10-CM

## 2022-05-27 DIAGNOSIS — E55.9 VITAMIN D DEFICIENCY: ICD-10-CM

## 2022-05-27 LAB
25(OH)D3 SERPL-MCNC: 36.7 NG/ML (ref 30–100)
ALBUMIN SERPL BCP-MCNC: 3.5 G/DL (ref 3.5–5)
ALP SERPL-CCNC: 97 U/L (ref 46–116)
ALT SERPL W P-5'-P-CCNC: 19 U/L (ref 12–78)
ANION GAP SERPL CALCULATED.3IONS-SCNC: 6 MMOL/L (ref 4–13)
AST SERPL W P-5'-P-CCNC: 13 U/L (ref 5–45)
BILIRUB SERPL-MCNC: 0.66 MG/DL (ref 0.2–1)
BUN SERPL-MCNC: 6 MG/DL (ref 5–25)
CALCIUM SERPL-MCNC: 9.2 MG/DL (ref 8.3–10.1)
CHLORIDE SERPL-SCNC: 107 MMOL/L (ref 100–108)
CHOLEST SERPL-MCNC: 212 MG/DL
CO2 SERPL-SCNC: 26 MMOL/L (ref 21–32)
CREAT SERPL-MCNC: 0.73 MG/DL (ref 0.6–1.3)
ERYTHROCYTE [DISTWIDTH] IN BLOOD BY AUTOMATED COUNT: 12.9 % (ref 11.6–15.1)
EST. AVERAGE GLUCOSE BLD GHB EST-MCNC: 111 MG/DL
FSH SERPL-ACNC: 2.9 MIU/ML
GFR SERPL CREATININE-BSD FRML MDRD: 99 ML/MIN/1.73SQ M
GLUCOSE P FAST SERPL-MCNC: 103 MG/DL (ref 65–99)
HBA1C MFR BLD: 5.5 %
HCT VFR BLD AUTO: 39.8 % (ref 34.8–46.1)
HDLC SERPL-MCNC: 48 MG/DL
HGB BLD-MCNC: 12.8 G/DL (ref 11.5–15.4)
LDLC SERPL CALC-MCNC: 145 MG/DL (ref 0–100)
MCH RBC QN AUTO: 29.9 PG (ref 26.8–34.3)
MCHC RBC AUTO-ENTMCNC: 32.2 G/DL (ref 31.4–37.4)
MCV RBC AUTO: 93 FL (ref 82–98)
PLATELET # BLD AUTO: 372 THOUSANDS/UL (ref 149–390)
PMV BLD AUTO: 10.8 FL (ref 8.9–12.7)
POTASSIUM SERPL-SCNC: 3.9 MMOL/L (ref 3.5–5.3)
PROT SERPL-MCNC: 7.5 G/DL (ref 6.4–8.2)
RBC # BLD AUTO: 4.28 MILLION/UL (ref 3.81–5.12)
SODIUM SERPL-SCNC: 139 MMOL/L (ref 136–145)
TRIGL SERPL-MCNC: 93 MG/DL
TSH SERPL DL<=0.05 MIU/L-ACNC: 1.35 UIU/ML (ref 0.45–4.5)
WBC # BLD AUTO: 6.25 THOUSAND/UL (ref 4.31–10.16)

## 2022-05-27 PROCEDURE — 83001 ASSAY OF GONADOTROPIN (FSH): CPT

## 2022-05-27 PROCEDURE — 80053 COMPREHEN METABOLIC PANEL: CPT

## 2022-05-27 PROCEDURE — 83036 HEMOGLOBIN GLYCOSYLATED A1C: CPT

## 2022-05-27 PROCEDURE — 36415 COLL VENOUS BLD VENIPUNCTURE: CPT

## 2022-05-27 PROCEDURE — 85027 COMPLETE CBC AUTOMATED: CPT

## 2022-05-27 PROCEDURE — 84403 ASSAY OF TOTAL TESTOSTERONE: CPT

## 2022-05-27 PROCEDURE — 82306 VITAMIN D 25 HYDROXY: CPT

## 2022-05-27 PROCEDURE — 80061 LIPID PANEL: CPT

## 2022-05-27 PROCEDURE — 84402 ASSAY OF FREE TESTOSTERONE: CPT

## 2022-05-27 PROCEDURE — 84443 ASSAY THYROID STIM HORMONE: CPT

## 2022-05-28 LAB
TESTOST FREE SERPL-MCNC: 0.9 PG/ML (ref 0–4.2)
TESTOST SERPL-MCNC: 32 NG/DL (ref 4–50)

## 2022-06-06 LAB
LAB AP GYN PRIMARY INTERPRETATION: NORMAL
LAB AP LMP: NORMAL
Lab: NORMAL

## 2022-07-06 ENCOUNTER — CLINICAL SUPPORT (OUTPATIENT)
Dept: FAMILY MEDICINE CLINIC | Facility: CLINIC | Age: 47
End: 2022-07-06
Payer: COMMERCIAL

## 2022-07-06 DIAGNOSIS — E53.8 B12 DEFICIENCY: Primary | ICD-10-CM

## 2022-07-06 PROCEDURE — 96372 THER/PROPH/DIAG INJ SC/IM: CPT

## 2022-07-06 RX ADMIN — CYANOCOBALAMIN 1000 MCG: 1000 INJECTION, SOLUTION INTRAMUSCULAR; SUBCUTANEOUS at 09:40

## 2022-07-06 NOTE — PROGRESS NOTES
Assessment/Plan:    No problem-specific Assessment & Plan notes found for this encounter  There are no diagnoses linked to this encounter  Subjective:      Patient ID: Kimberly Talavera is a 40 y o  female  HPI        Review of Systems      Objective: There were no vitals taken for this visit           Physical Exam

## 2022-07-26 ENCOUNTER — APPOINTMENT (OUTPATIENT)
Dept: LAB | Facility: AMBULARY SURGERY CENTER | Age: 47
End: 2022-07-26
Payer: COMMERCIAL

## 2022-07-26 ENCOUNTER — OFFICE VISIT (OUTPATIENT)
Dept: FAMILY MEDICINE CLINIC | Facility: CLINIC | Age: 47
End: 2022-07-26
Payer: COMMERCIAL

## 2022-07-26 VITALS
WEIGHT: 186.2 LBS | SYSTOLIC BLOOD PRESSURE: 100 MMHG | OXYGEN SATURATION: 99 % | HEIGHT: 68 IN | BODY MASS INDEX: 28.22 KG/M2 | RESPIRATION RATE: 14 BRPM | TEMPERATURE: 97.8 F | DIASTOLIC BLOOD PRESSURE: 80 MMHG | HEART RATE: 78 BPM

## 2022-07-26 DIAGNOSIS — Z11.4 SCREENING FOR HIV (HUMAN IMMUNODEFICIENCY VIRUS): ICD-10-CM

## 2022-07-26 DIAGNOSIS — E53.8 B12 DEFICIENCY: ICD-10-CM

## 2022-07-26 DIAGNOSIS — Z11.59 ENCOUNTER FOR HEPATITIS C SCREENING TEST FOR LOW RISK PATIENT: ICD-10-CM

## 2022-07-26 DIAGNOSIS — Z12.11 SCREENING FOR COLON CANCER: ICD-10-CM

## 2022-07-26 DIAGNOSIS — E03.9 HYPOTHYROIDISM, UNSPECIFIED TYPE: ICD-10-CM

## 2022-07-26 DIAGNOSIS — E78.5 MILD HYPERLIPIDEMIA: ICD-10-CM

## 2022-07-26 DIAGNOSIS — Z86.16 HISTORY OF COVID-19: ICD-10-CM

## 2022-07-26 DIAGNOSIS — E55.9 VITAMIN D DEFICIENCY: ICD-10-CM

## 2022-07-26 DIAGNOSIS — E55.9 VITAMIN D DEFICIENCY: Primary | ICD-10-CM

## 2022-07-26 DIAGNOSIS — E03.9 ACQUIRED HYPOTHYROIDISM: Primary | ICD-10-CM

## 2022-07-26 LAB — HCV AB SER QL: NORMAL

## 2022-07-26 PROCEDURE — 87389 HIV-1 AG W/HIV-1&-2 AB AG IA: CPT

## 2022-07-26 PROCEDURE — 99214 OFFICE O/P EST MOD 30 MIN: CPT | Performed by: FAMILY MEDICINE

## 2022-07-26 PROCEDURE — 36415 COLL VENOUS BLD VENIPUNCTURE: CPT

## 2022-07-26 PROCEDURE — 86803 HEPATITIS C AB TEST: CPT

## 2022-07-26 RX ORDER — ERGOCALCIFEROL 1.25 MG/1
50000 CAPSULE ORAL WEEKLY
Qty: 12 CAPSULE | Refills: 3 | Status: SHIPPED | OUTPATIENT
Start: 2022-07-26

## 2022-07-26 RX ORDER — LEVOTHYROXINE SODIUM 0.12 MG/1
125 TABLET ORAL DAILY
Qty: 90 TABLET | Refills: 3 | Status: SHIPPED | OUTPATIENT
Start: 2022-07-26

## 2022-07-26 NOTE — PROGRESS NOTES
Assessment/Plan:    1  Acquired hypothyroidism  Assessment & Plan:  Stable on levo    Orders:  -     TSH, 3rd generation with Free T4 reflex; Future; Expected date: 11/01/2022    2  Vitamin D deficiency  Assessment & Plan:  Stable on vitamin d RX      3  Mild hyperlipidemia  Assessment & Plan:  Mild bump in LDL    Orders:  -     Comprehensive metabolic panel; Future; Expected date: 11/01/2022  -     Lipid Panel with Direct LDL reflex; Future; Expected date: 11/01/2022    4  B12 deficiency  Assessment & Plan:  Getting b12 injections    Orders:  -     Vitamin B12; Future; Expected date: 11/01/2022    5  Screening for HIV (human immunodeficiency virus)  -     HIV 1/2 Antigen/Antibody (4th Generation) w Reflex [facility]; Future    6  Encounter for hepatitis C screening test for low risk patient  -     Hepatitis C antibody; Future    7  Screening for colon cancer  -     Ambulatory Referral to Colorectal Surgery; Future    8  History of COVID-19  Assessment & Plan: May 5/2022              There are no Patient Instructions on file for this visit  Return in about 6 months (around 1/26/2023) for Annual physical     Subjective:      Patient ID: Brian Talbot is a 40 y o  female  Chief Complaint   Patient presents with    Follow-up     Patient here for follow up        Here for follow up  Has continued to lose weight  Labs reviewed  Had covid- May 2022  Son going to college  Knee issues- walking  Seeing ortho- getting injections  Doing intermittent fasting      The following portions of the patient's history were reviewed and updated as appropriate: allergies, current medications, past family history, past medical history, past social history, past surgical history and problem list     Review of Systems   Constitutional: Negative  HENT: Negative  Eyes: Negative  Respiratory: Negative  Cardiovascular: Negative  Musculoskeletal: Positive for arthralgias (knee pain)     Skin:        Hair loss Allergic/Immunologic: Negative  Neurological: Negative  Hematological: Negative  Psychiatric/Behavioral: Negative  Current Outpatient Medications   Medication Sig Dispense Refill    acetaminophen (TYLENOL) 500 mg tablet Take 2 tablets (1,000 mg total) by mouth every 6 (six) hours as needed for mild pain or moderate pain 30 tablet 0    albuterol (ProAir HFA) 90 mcg/act inhaler Inhale 2 puffs every 6 (six) hours as needed for wheezing 8 5 g 0    ALPRAZolam (XANAX) 0 5 mg tablet Take 1 tablet (0 5 mg total) by mouth daily as needed for anxiety 30 tablet 0    Cholecalciferol (Vitamin D3) 1 25 MG (79284 UT) CAPS       ibuprofen (MOTRIN) 600 mg tablet Take 1 tablet (600 mg total) by mouth every 6 (six) hours as needed for mild pain or moderate pain 30 tablet 0    valACYclovir (VALTREX) 1,000 mg tablet Take 1 tablet (1,000 mg total) by mouth in the morning and 1 tablet (1,000 mg total) in the evening  Do all this for 5 days  10 tablet 0    ZOLMitriptan (ZOMIG) 5 MG tablet Take 1 tablet (5 mg total) by mouth once as needed for migraine for up to 1 dose 6 tablet 0    ergocalciferol (VITAMIN D2) 50,000 units Take 1 capsule (50,000 Units total) by mouth once a week 12 capsule 3    levothyroxine 125 mcg tablet Take 1 tablet (125 mcg total) by mouth daily 90 tablet 3     Current Facility-Administered Medications   Medication Dose Route Frequency Provider Last Rate Last Admin    cyanocobalamin injection 1,000 mcg  1,000 mcg Intramuscular Q30 Days Heather Alonzo DO   1,000 mcg at 07/06/22 0940       Objective:    /80 (BP Location: Left arm, Patient Position: Sitting, Cuff Size: Standard)   Pulse 78   Temp 97 8 °F (36 6 °C) (Tympanic)   Resp 14   Ht 5' 8" (1 727 m)   Wt 84 5 kg (186 lb 3 2 oz)   SpO2 99%   BMI 28 31 kg/m²        Physical Exam  Vitals and nursing note reviewed  Constitutional:       Appearance: Normal appearance  HENT:      Head: Normocephalic and atraumatic     Eyes: Extraocular Movements: Extraocular movements intact  Pupils: Pupils are equal, round, and reactive to light  Cardiovascular:      Rate and Rhythm: Normal rate and regular rhythm  Pulses: Normal pulses  Heart sounds: Normal heart sounds  Pulmonary:      Effort: Pulmonary effort is normal       Breath sounds: Normal breath sounds  Musculoskeletal:      Cervical back: Normal range of motion and neck supple  Skin:     Capillary Refill: Capillary refill takes less than 2 seconds  Neurological:      General: No focal deficit present  Mental Status: She is alert and oriented to person, place, and time  Psychiatric:         Mood and Affect: Mood normal          Behavior: Behavior normal          Thought Content:  Thought content normal          Judgment: Judgment normal                 Michael Mote, DO

## 2022-07-27 LAB — HIV 1+2 AB+HIV1 P24 AG SERPL QL IA: NORMAL

## 2022-08-29 DIAGNOSIS — H02.403 PTOSIS OF BOTH EYELIDS: Primary | ICD-10-CM

## 2022-09-16 ENCOUNTER — HOSPITAL ENCOUNTER (OUTPATIENT)
Dept: GASTROENTEROLOGY | Facility: AMBULARY SURGERY CENTER | Age: 47
Setting detail: OUTPATIENT SURGERY
End: 2022-09-16
Attending: COLON & RECTAL SURGERY
Payer: COMMERCIAL

## 2022-09-16 ENCOUNTER — ANESTHESIA EVENT (OUTPATIENT)
Dept: GASTROENTEROLOGY | Facility: AMBULARY SURGERY CENTER | Age: 47
End: 2022-09-16

## 2022-09-16 ENCOUNTER — ANESTHESIA (OUTPATIENT)
Dept: GASTROENTEROLOGY | Facility: AMBULARY SURGERY CENTER | Age: 47
End: 2022-09-16

## 2022-09-16 VITALS
SYSTOLIC BLOOD PRESSURE: 104 MMHG | RESPIRATION RATE: 15 BRPM | TEMPERATURE: 96.8 F | BODY MASS INDEX: 27.74 KG/M2 | WEIGHT: 183 LBS | HEIGHT: 68 IN | HEART RATE: 70 BPM | OXYGEN SATURATION: 99 % | DIASTOLIC BLOOD PRESSURE: 63 MMHG

## 2022-09-16 DIAGNOSIS — Z12.11 ENCOUNTER FOR SCREENING COLONOSCOPY: ICD-10-CM

## 2022-09-16 PROCEDURE — 88305 TISSUE EXAM BY PATHOLOGIST: CPT | Performed by: STUDENT IN AN ORGANIZED HEALTH CARE EDUCATION/TRAINING PROGRAM

## 2022-09-16 PROCEDURE — 45385 COLONOSCOPY W/LESION REMOVAL: CPT | Performed by: COLON & RECTAL SURGERY

## 2022-09-16 PROCEDURE — 99243 OFF/OP CNSLTJ NEW/EST LOW 30: CPT | Performed by: COLON & RECTAL SURGERY

## 2022-09-16 RX ORDER — PROPOFOL 10 MG/ML
INJECTION, EMULSION INTRAVENOUS AS NEEDED
Status: DISCONTINUED | OUTPATIENT
Start: 2022-09-16 | End: 2022-09-16

## 2022-09-16 RX ORDER — LIDOCAINE HYDROCHLORIDE 10 MG/ML
INJECTION, SOLUTION EPIDURAL; INFILTRATION; INTRACAUDAL; PERINEURAL AS NEEDED
Status: DISCONTINUED | OUTPATIENT
Start: 2022-09-16 | End: 2022-09-16

## 2022-09-16 RX ORDER — SODIUM CHLORIDE, SODIUM LACTATE, POTASSIUM CHLORIDE, CALCIUM CHLORIDE 600; 310; 30; 20 MG/100ML; MG/100ML; MG/100ML; MG/100ML
INJECTION, SOLUTION INTRAVENOUS CONTINUOUS PRN
Status: DISCONTINUED | OUTPATIENT
Start: 2022-09-16 | End: 2022-09-16

## 2022-09-16 RX ADMIN — PROPOFOL 50 MG: 10 INJECTION, EMULSION INTRAVENOUS at 09:40

## 2022-09-16 RX ADMIN — PROPOFOL 50 MG: 10 INJECTION, EMULSION INTRAVENOUS at 09:36

## 2022-09-16 RX ADMIN — PROPOFOL 50 MG: 10 INJECTION, EMULSION INTRAVENOUS at 09:27

## 2022-09-16 RX ADMIN — SODIUM CHLORIDE, SODIUM LACTATE, POTASSIUM CHLORIDE, AND CALCIUM CHLORIDE: .6; .31; .03; .02 INJECTION, SOLUTION INTRAVENOUS at 09:15

## 2022-09-16 RX ADMIN — PROPOFOL 50 MG: 10 INJECTION, EMULSION INTRAVENOUS at 09:43

## 2022-09-16 RX ADMIN — PROPOFOL 50 MG: 10 INJECTION, EMULSION INTRAVENOUS at 09:31

## 2022-09-16 RX ADMIN — PROPOFOL 100 MG: 10 INJECTION, EMULSION INTRAVENOUS at 09:21

## 2022-09-16 RX ADMIN — LIDOCAINE HYDROCHLORIDE 40 MG: 10 INJECTION, SOLUTION EPIDURAL; INFILTRATION; INTRACAUDAL at 09:21

## 2022-09-16 RX ADMIN — PROPOFOL 50 MG: 10 INJECTION, EMULSION INTRAVENOUS at 09:24

## 2022-09-16 NOTE — H&P
History and Physical   Colon and Rectal Surgery   Ellie Cross 40 y o  female MRN: [id number]  Unit/Bed#:  Encounter: [id number]  09/16/22   @NOW    No chief complaint on file          History of Present Illness   HPI:  Paulino Olivera is a 40 y o  female who presents for screening colonoscopy      Historical Information   Past Medical History:   Diagnosis Date    Arthritis     Asthma     Breast lump     resolved [id number]    Depression     Disease of thyroid gland     Hair loss     Hemorrhage of conjunctiva, left     last assessed [id number]    Knee joint pain     right    Lyme disease     lastt assessed [id number]    Migraine     Thyroid disease      Past Surgical History:   Procedure Laterality Date    LUMBAR FUSION  01/01/2004    Lumbar Vertebral Fusion;  2004    [place] HYSTEROSCOPY,W/ENDO BX N/A 2/16/2022    Procedure: DILATATION AND CURETTAGE (D&C) WITH HYSTEROSCOPY;  Surgeon: Debbi Louis MD;  Location: [address] [place] [address];  Service: Gynecology    [place] HYSTEROSCOPY,W/ENDOMETRIAL ABLATION N/A 2/16/2022    Procedure: ABLATION ENDOMETRIAL FREDDY;  Surgeon: Debbi Louis MD;  Location: [facility];  Service: Gynecology    [place] LAP,RMV  ADNEXAL STRUCTURE Bilateral 2/16/2022    Procedure: SALPINGECTOMY, LAPAROSCOPIC;  Surgeon: Debbi Louis MD;  Location: [facility];  Service: Gynecology    WISDOM TOOTH EXTRACTION         Meds/Allergies     (Not in a hospital admission)        Current Outpatient Medications:     acetaminophen (TYLENOL) 500 mg tablet, Take 2 tablets (1,000 mg total) by mouth every 6 (six) hours as needed for mild pain or moderate pain, Disp: 30 tablet, Rfl: 0    albuterol (ProAir HFA) 90 mcg/act inhaler, Inhale 2 puffs every 6 (six) hours as needed for wheezing, Disp: 8 5 g, Rfl: 0    ALPRAZolam (XANAX) 0 5 mg tablet, Take 1 tablet (0 5 mg total) by mouth daily as needed for anxiety, Disp: 30 tablet, Rfl: 0    Cholecalciferol (Vitamin D3) 1 25 MG (47920 UT) CAPS, , Disp: , Rfl:     ergocalciferol (VITAMIN D2) 50,000 units, Take 1 capsule (50,000 Units total) by mouth once a week, Disp: 12 capsule, Rfl: 3    ibuprofen (MOTRIN) 600 mg tablet, Take 1 tablet (600 mg total) by mouth every 6 (six) hours as needed for mild pain or moderate pain, Disp: 30 tablet, Rfl: 0    levothyroxine 125 mcg tablet, Take 1 tablet (125 mcg total) by mouth daily, Disp: 90 tablet, Rfl: 3    valACYclovir (VALTREX) 1,000 mg tablet, Take 1 tablet (1,000 mg total) by mouth in the morning and 1 tablet (1,000 mg total) in the evening  Do all this for 5 days  , Disp: 10 tablet, Rfl: 0    ZOLMitriptan (ZOMIG) 5 MG tablet, Take 1 tablet (5 mg total) by mouth once as needed for migraine for up to 1 dose, Disp: 6 tablet, Rfl: 0    Current Facility-Administered Medications:     cyanocobalamin injection 1,000 mcg, 1,000 mcg, Intramuscular, Q30 Days, Mona Coley DO, 1,000 mcg at 22 0940    Allergies   Allergen Reactions    Pollen Extract          Social History   Social History     Substance and Sexual Activity   Alcohol Use Not Currently    Comment: occ     Social History     Substance and Sexual Activity   Drug Use No     Social History     Tobacco Use   Smoking Status Never Smoker   Smokeless Tobacco Never Used         Family History:   Family History   Problem Relation Age of Onset    Atrial fibrillation Mother     Hyperlipidemia Mother     Hypertension Mother     Supraventricular tachycardia Mother     Heart disease Mother     Diabetes Father             Hyperlipidemia Father     Hypertension Father             Arthritis Maternal Grandmother     Leukemia Maternal Grandmother     Cancer Maternal Grandmother         blood ca    Stroke Maternal Grandfather             Pancreatic cancer Maternal Grandfather 72    No Known Problems Sister     No Known Problems Paternal Grandmother     No Known Problems Paternal Grandfather     No Known Problems Sister     No Known Problems Paternal Aunt     No Known Problems Paternal Aunt     No Known Problems Paternal Aunt          Objective     Current Vitals:      No intake or output data in the 24 hours ending 09/16/22 0842    Physical Exam:  General:  Resting comfortably in bed   Eyes:Sclera anicteric  ENT: Trachea midline  Pulm:  Symmetric chest raise  No respiratory Distress  CV:  Regular on monitor  Abdomen:  Soft NT ND  Extremities:  No clubbing/ cyanosis/ edema    Lab Results: I have personally reviewed pertinent lab results  Imaging: I have personally reviewed pertinent reports  ASSESSMENT:  Paulino Olivera is a 40 y o  female who presents for outpatient colonoscopy  PLAN:  For colonoscopy    Risks/ Benefits reviewed to include but not limited to anesthesia, bleeding, missed lesions, and colonoscopic perforation requiring surgery

## 2022-09-16 NOTE — ANESTHESIA PREPROCEDURE EVALUATION
Procedure:  COLONOSCOPY    Relevant Problems   CARDIO   (+) Migraine   (+) Mild hyperlipidemia      ENDO   (+) Hypothyroidism      MUSCULOSKELETAL   (+) Fibromyalgia   (+) Primary osteoarthritis of left knee   (+) Primary osteoarthritis of right knee      NEURO/PSYCH   (+) Fibromyalgia   (+) Generalized anxiety disorder   (+) History of COVID-19   (+) Major depressive disorder with single episode, in full remission (HCC)   (+) Migraine      PULMONARY   (+) Mild intermittent asthma without complication        Physical Exam    Airway    Mallampati score: II  TM Distance: >3 FB  Neck ROM: full     Dental   No notable dental hx     Cardiovascular  Rhythm: regular, Rate: normal,     Pulmonary  Breath sounds clear to auscultation,     Other Findings  Intercisor Distance > 3cm          Anesthesia Plan  ASA Score- 2     Anesthesia Type- IV sedation with anesthesia with ASA Monitors  Additional Monitors:   Airway Plan:     Comment: NPO appropriate  Discussed benefits/risks of monitored anesthetic care which involves providing a dynamic level of mild to deep sedation  Complications include awareness and/or airway obstruction/aspiration which may necessitate conversion to general anesthesia  All questions answered  Patient understands and wishes to proceed          Plan Factors-Exercise tolerance (METS): >4 METS  Chart reviewed  EKG reviewed  Existing labs reviewed  Induction-     Postoperative Plan- Plan for postoperative opioid use  Informed Consent- Anesthetic plan and risks discussed with patient  I personally reviewed this patient with the CRNA  Discussed and agreed on the Anesthesia Plan with the CRNA  Henna Boykin

## 2022-09-16 NOTE — ANESTHESIA POSTPROCEDURE EVALUATION
Post-Op Assessment Note    CV Status:  Stable  Pain Score: 0    Pain management: adequate     Mental Status:  Awake   Hydration Status:  Stable   PONV Controlled:  None   Airway Patency:  Patent      Post Op Vitals Reviewed: Yes      Staff: CRNA         No complications documented      /67 (09/16/22 0949)    Temp (!) 96 8 °F (36 °C) (09/16/22 0949)    Pulse 80 (09/16/22 0949)   Resp 16 (09/16/22 0949)    SpO2 98 % (09/16/22 0949)    Post procedure VS noted above, SV non obstructed

## 2022-09-21 ENCOUNTER — HOSPITAL ENCOUNTER (OUTPATIENT)
Dept: RADIOLOGY | Facility: HOSPITAL | Age: 47
Discharge: HOME | End: 2022-09-21
Payer: COMMERCIAL

## 2022-09-21 ENCOUNTER — OFFICE VISIT (OUTPATIENT)
Dept: OBGYN CLINIC | Facility: CLINIC | Age: 47
End: 2022-09-21
Payer: COMMERCIAL

## 2022-09-21 VITALS
HEIGHT: 68 IN | HEART RATE: 84 BPM | DIASTOLIC BLOOD PRESSURE: 80 MMHG | SYSTOLIC BLOOD PRESSURE: 116 MMHG | WEIGHT: 185 LBS | BODY MASS INDEX: 28.04 KG/M2

## 2022-09-21 DIAGNOSIS — M25.522 PAIN IN LEFT ELBOW: ICD-10-CM

## 2022-09-21 DIAGNOSIS — M77.8 TENDINITIS OF LEFT ELBOW: Primary | ICD-10-CM

## 2022-09-21 PROCEDURE — 99214 OFFICE O/P EST MOD 30 MIN: CPT | Performed by: ORTHOPAEDIC SURGERY

## 2022-09-21 PROCEDURE — 88305 TISSUE EXAM BY PATHOLOGIST: CPT | Performed by: STUDENT IN AN ORGANIZED HEALTH CARE EDUCATION/TRAINING PROGRAM

## 2022-09-21 PROCEDURE — 73080 X-RAY EXAM OF ELBOW: CPT

## 2022-09-21 NOTE — PROGRESS NOTES
[address] [facility] [address]  [facility]  [id number]  1975    ORTHOPAEDIC SURGERY OUTPATIENT NOTE  9/21/2022      HISTORY:  40 y o  female  presents to clinic with left elbow pain for last several months intermittently  She is right-hand dominant  She denies fall or trauma the elbow  She has pain when she goes for walks sore is lifting things  She does feel like she has some weakness in the arm with holding [address] couple coffee  She denies numbness or tingling in the left upper extremity  No treatment for this yet  She does not smoke  She is not diabetic  Does not take blood thinners      Past Medical History:   Diagnosis Date    Arthritis     Asthma     Breast lump     resolved [id number]    Depression     Disease of thyroid gland     Hair loss     Hemorrhage of conjunctiva, left     last assessed [id number]    Knee joint pain     right    Lyme disease     lastt assessed [id number]    Migraine     Thyroid disease        Past Surgical History:   Procedure Laterality Date    LUMBAR FUSION  01/01/2004    Lumbar Vertebral Fusion;  2004    [place] HYSTEROSCOPY,W/ENDO BX N/A 2/16/2022    Procedure: DILATATION AND CURETTAGE (D&C) WITH HYSTEROSCOPY;  Surgeon: Melissa Khan MD;  Location: [address] [place] [address];  Service: Gynecology    [place] HYSTEROSCOPY,W/ENDOMETRIAL ABLATION N/A 2/16/2022    Procedure: Cynthia Hickman;  Surgeon: Melissa Khan MD;  Location: [address] [place] [address];  Service: Gynecology    [place] LAP,RMV  ADNEXAL STRUCTURE Bilateral 2/16/2022    Procedure: SALPINGECTOMY, LAPAROSCOPIC;  Surgeon: Melissa Khan MD;  Location: [facility];  Service: Gynecology    WISDOM TOOTH EXTRACTION         Social History     Socioeconomic History    Marital status:      Spouse name: Not on file    Number of children: Not on file    Years of education: Not on file  Highest education level: Not on file   Occupational History    Not on file   Tobacco Use    Smoking status: Never Smoker    Smokeless tobacco: Never Used   Vaping Use    Vaping Use: Never used   Substance and Sexual Activity    Alcohol use: Not Currently     Comment: occ    Drug use: No    Sexual activity: Yes     Partners: Male   Other Topics Concern    Not on file   Social History Narrative    Caffeine use     Social Determinants of Health     Financial Resource Strain: Not on file   Food Insecurity: Not on file   Transportation Needs: Not on file   Physical Activity: Not on file   Stress: Not on file   Social Connections: Not on file   Intimate Partner Violence: Not on file   Housing Stability: Not on file       Family History   Problem Relation Age of Onset    Atrial fibrillation Mother     Hyperlipidemia Mother     Hypertension Mother     Supraventricular tachycardia Mother     Heart disease Mother     Diabetes Father             Hyperlipidemia Father     Hypertension Father             Arthritis Maternal Grandmother     Leukemia Maternal Grandmother     Cancer Maternal Grandmother         blood ca    Stroke Maternal Grandfather             Pancreatic cancer Maternal Grandfather 72    No Known Problems Sister     No Known Problems Paternal Grandmother     No Known Problems Paternal Grandfather     No Known Problems Sister     No Known Problems Paternal Aunt     No Known Problems Paternal Aunt     No Known Problems Paternal Aunt         Patient's Medications   New Prescriptions    No medications on file   Previous Medications    ACETAMINOPHEN (TYLENOL) 500 MG TABLET    Take 2 tablets (1,000 mg total) by mouth every 6 (six) hours as needed for mild pain or moderate pain    ALBUTEROL (PROAIR HFA) 90 MCG/ACT INHALER    Inhale 2 puffs every 6 (six) hours as needed for wheezing    ALPRAZOLAM (XANAX) 0 5 MG TABLET    Take 1 tablet (0 5 mg total) by mouth daily as needed for anxiety    CHOLECALCIFEROL (VITAMIN D3) 1 25 MG (42355 UT) CAPS        ERGOCALCIFEROL (VITAMIN D2) 50,000 UNITS    Take 1 capsule (50,000 Units total) by mouth once a week    IBUPROFEN (MOTRIN) 600 MG TABLET    Take 1 tablet (600 mg total) by mouth every 6 (six) hours as needed for mild pain or moderate pain    LEVOTHYROXINE 125 MCG TABLET    Take 1 tablet (125 mcg total) by mouth daily    VALACYCLOVIR (VALTREX) 1,000 MG TABLET    Take 1 tablet (1,000 mg total) by mouth in the morning and 1 tablet (1,000 mg total) in the evening  Do all this for 5 days  ZOLMITRIPTAN (ZOMIG) 5 MG TABLET    Take 1 tablet (5 mg total) by mouth once as needed for migraine for up to 1 dose   Modified Medications    No medications on file   Discontinued Medications    No medications on file       Allergies   Allergen Reactions    Pollen Extract         /80   Pulse 84   Ht 5' 8" (1 727 m)   Wt 83 9 kg (185 lb)   BMI 28 13 kg/m²      REVIEW OF SYSTEMS:  Constitutional: Negative  HEENT: Negative  Respiratory: Negative  Skin: Negative  Neurological: Negative  Psychiatric/Behavioral: Negative  Musculoskeletal: Negative except for that mentioned in the HPI      PHYSICAL EXAM:      /80   Pulse 84   Ht 5' 8" (1 727 m)   Wt 83 9 kg (185 lb)   BMI 28 13 kg/m²   Gen: No acute distress, resting comfortably in bed  HEENT: Eyes clear, moist mucus membranes, hearing intact  Respiratory: No audible wheezing or stridor  Cardiovascular: Well Perfused peripherally, 2+ distal pulse  Abdomen: nondistended, no peritoneal signs    R elbow:  Flexion: 140 degrees  Extension: 0 degrees  Pronation: 80 degrees  Supination: 80 degrees      Strength:  Flexion: 5/5  Extension: 5/5  Pronation: 5/5  Supination: 5/5    Radial/median/ulnar nerve intact    <2 sec cap refill      L elbow:  Flexion: 140 degrees  Extension: 0 degrees  Pronation: 80 degrees  Supination: 80 degrees    TTP Lateral Epicondyle: negative  TTP Medial Epicondyle: negative  TTP Olecranon: negative  TTP Radial Head: negative  TTP Biceps Tendon: negative    Strength:  Flexion: 5/5  Extension: 4+/5  Pronation: 5/5  Supination: 5/5    Pain with resisted wrist extension: positivve  Pain with resisted 3rd finger extension: positive  Pain with resisted wrist flexion:  Negative    Varus laxity: negative  Valgus laxity: negative  Milking maneuver: negative  Moving valgus stress test: negative    Cubital tunnel Tinel's: negative    Radial/median/ulnar nerve intact    <2 sec cap refill    Neck:   Spurling's Maneuver: negative  FROM flexion, extension, rotation, sidebending    Reflexes:   Triceps: symmetric bilaterally  Biceps: symmetric bilaterally  Brachioradialis: symmetric bilaterally        IMAGING:  Left elbow x-rays taken today in the office  They demonstrate no acute finding or degenerative changes       ASSESSMENT AND PLAN:  40 y o  female  with left elbow tendinitis  She does have some weakness with using her arm away from her body  We will prescribe her with Voltaren gel to use on the elbow as well as starting with occupational therapy for strengthening of the left upper extremity  She will ice the elbow as needed  Activity as tolerated  She can use the Voltaren or over-the-counter NSAIDs as needed  Discussed this may take several weeks of therapy to improve  We will see her back as needed      Scribe Attestation      I,:  Erick Garrison PA-C am acting as a scribe while in the presence of the attending physician :       I,:  Rohit Banerjee personally performed the services described in this documentation    as scribed in my presence :

## 2022-10-11 PROBLEM — Z12.11 SCREENING FOR COLON CANCER: Status: RESOLVED | Noted: 2022-07-26 | Resolved: 2022-10-11

## 2022-10-12 PROBLEM — Z13.1 SCREENING FOR DIABETES MELLITUS: Status: RESOLVED | Noted: 2022-01-20 | Resolved: 2022-10-12

## 2022-10-12 PROBLEM — Z01.419 ROUTINE GYNECOLOGICAL EXAMINATION: Status: RESOLVED | Noted: 2021-05-20 | Resolved: 2022-10-12

## 2022-10-12 PROBLEM — Z13.220 ENCOUNTER FOR LIPID SCREENING FOR CARDIOVASCULAR DISEASE: Status: RESOLVED | Noted: 2022-01-20 | Resolved: 2022-10-12

## 2022-10-12 PROBLEM — Z13.6 ENCOUNTER FOR LIPID SCREENING FOR CARDIOVASCULAR DISEASE: Status: RESOLVED | Noted: 2022-01-20 | Resolved: 2022-10-12

## 2022-12-21 ENCOUNTER — TELEPHONE (OUTPATIENT)
Dept: FAMILY MEDICINE CLINIC | Facility: CLINIC | Age: 47
End: 2022-12-21

## 2022-12-21 DIAGNOSIS — T75.3XXA MOTION SICKNESS, INITIAL ENCOUNTER: Primary | ICD-10-CM

## 2022-12-21 DIAGNOSIS — E55.9 VITAMIN D DEFICIENCY: ICD-10-CM

## 2022-12-21 DIAGNOSIS — B00.1 COLD SORE: ICD-10-CM

## 2022-12-21 DIAGNOSIS — E03.9 HYPOTHYROIDISM, UNSPECIFIED TYPE: ICD-10-CM

## 2022-12-21 RX ORDER — VALACYCLOVIR HYDROCHLORIDE 1 G/1
1000 TABLET, FILM COATED ORAL 2 TIMES DAILY
Qty: 10 TABLET | Refills: 0 | Status: SHIPPED | OUTPATIENT
Start: 2022-12-21 | End: 2022-12-26

## 2022-12-21 RX ORDER — ERGOCALCIFEROL 1.25 MG/1
50000 CAPSULE ORAL WEEKLY
Qty: 12 CAPSULE | Refills: 0 | Status: SHIPPED | OUTPATIENT
Start: 2022-12-21

## 2022-12-21 RX ORDER — LEVOTHYROXINE SODIUM 0.12 MG/1
125 TABLET ORAL DAILY
Qty: 90 TABLET | Refills: 0 | Status: SHIPPED | OUTPATIENT
Start: 2022-12-21

## 2022-12-21 RX ORDER — SCOLOPAMINE TRANSDERMAL SYSTEM 1 MG/1
1 PATCH, EXTENDED RELEASE TRANSDERMAL
Qty: 4 PATCH | Refills: 0 | Status: SHIPPED | OUTPATIENT
Start: 2022-12-21

## 2022-12-21 NOTE — TELEPHONE ENCOUNTER
Patient stated that she is leaving tomorrow for a vacation and is asking for the medication to be sent over today  Please advise         Medication:scopolamine (TRANSDERM-SCOP) 1 5 mg/3 days TD 72 hr patch [[id number]]  DISCONTINUED  Dosage:  How Often:Place on the skin  Quantity:  na  Last Office Visit: 7/26/22  Next Office Visit: none   Last refilled: na  How many pills left:0  Pharmacy:   [address] [place] [address]) - [place] [address]  [address] [place] [address]  Phone: [phone] Fax: [phone]

## 2023-03-08 ENCOUNTER — APPOINTMENT (OUTPATIENT)
Dept: LAB | Facility: AMBULARY SURGERY CENTER | Age: 48
End: 2023-03-08

## 2023-03-08 ENCOUNTER — OFFICE VISIT (OUTPATIENT)
Dept: FAMILY MEDICINE CLINIC | Facility: CLINIC | Age: 48
End: 2023-03-08

## 2023-03-08 VITALS
SYSTOLIC BLOOD PRESSURE: 124 MMHG | TEMPERATURE: 98.1 F | HEIGHT: 68 IN | RESPIRATION RATE: 16 BRPM | WEIGHT: 190.6 LBS | OXYGEN SATURATION: 98 % | BODY MASS INDEX: 28.89 KG/M2 | DIASTOLIC BLOOD PRESSURE: 88 MMHG | HEART RATE: 82 BPM

## 2023-03-08 DIAGNOSIS — E78.5 MILD HYPERLIPIDEMIA: ICD-10-CM

## 2023-03-08 DIAGNOSIS — Z13.1 SCREENING FOR DIABETES MELLITUS: ICD-10-CM

## 2023-03-08 DIAGNOSIS — B00.1 COLD SORE: ICD-10-CM

## 2023-03-08 DIAGNOSIS — E61.1 IRON DEFICIENCY: ICD-10-CM

## 2023-03-08 DIAGNOSIS — E53.8 B12 DEFICIENCY: ICD-10-CM

## 2023-03-08 DIAGNOSIS — N92.6 IRREGULAR PERIODS: ICD-10-CM

## 2023-03-08 DIAGNOSIS — Z00.00 ANNUAL PHYSICAL EXAM: Primary | ICD-10-CM

## 2023-03-08 DIAGNOSIS — E03.9 ACQUIRED HYPOTHYROIDISM: ICD-10-CM

## 2023-03-08 LAB
ALBUMIN SERPL BCP-MCNC: 3.7 G/DL (ref 3.5–5)
ALP SERPL-CCNC: 93 U/L (ref 46–116)
ALT SERPL W P-5'-P-CCNC: 16 U/L (ref 12–78)
ANION GAP SERPL CALCULATED.3IONS-SCNC: 4 MMOL/L (ref 4–13)
AST SERPL W P-5'-P-CCNC: 12 U/L (ref 5–45)
BILIRUB SERPL-MCNC: 0.77 MG/DL (ref 0.2–1)
BUN SERPL-MCNC: 9 MG/DL (ref 5–25)
CALCIUM SERPL-MCNC: 10.1 MG/DL (ref 8.3–10.1)
CHLORIDE SERPL-SCNC: 108 MMOL/L (ref 96–108)
CHOLEST SERPL-MCNC: 239 MG/DL
CO2 SERPL-SCNC: 24 MMOL/L (ref 21–32)
CREAT SERPL-MCNC: 0.65 MG/DL (ref 0.6–1.3)
EST. AVERAGE GLUCOSE BLD GHB EST-MCNC: 105 MG/DL
FERRITIN SERPL-MCNC: 34 NG/ML (ref 8–388)
GFR SERPL CREATININE-BSD FRML MDRD: 106 ML/MIN/1.73SQ M
GLUCOSE P FAST SERPL-MCNC: 94 MG/DL (ref 65–99)
HBA1C MFR BLD: 5.3 %
HDLC SERPL-MCNC: 56 MG/DL
LDLC SERPL CALC-MCNC: 164 MG/DL (ref 0–100)
POTASSIUM SERPL-SCNC: 4.2 MMOL/L (ref 3.5–5.3)
PROT SERPL-MCNC: 7.5 G/DL (ref 6.4–8.4)
SODIUM SERPL-SCNC: 136 MMOL/L (ref 135–147)
TRIGL SERPL-MCNC: 96 MG/DL
TSH SERPL DL<=0.05 MIU/L-ACNC: 0.99 UIU/ML (ref 0.45–4.5)
VIT B12 SERPL-MCNC: >6000 PG/ML (ref 100–900)

## 2023-03-08 RX ORDER — CYANOCOBALAMIN 1000 UG/ML
1000 INJECTION, SOLUTION INTRAMUSCULAR; SUBCUTANEOUS
Status: SHIPPED | OUTPATIENT
Start: 2023-03-16

## 2023-03-08 RX ORDER — VALACYCLOVIR HYDROCHLORIDE 1 G/1
1000 TABLET, FILM COATED ORAL 2 TIMES DAILY
Qty: 10 TABLET | Refills: 5 | Status: SHIPPED | OUTPATIENT
Start: 2023-03-08 | End: 2023-03-13

## 2023-03-08 RX ADMIN — CYANOCOBALAMIN 1000 MCG: 1000 INJECTION, SOLUTION INTRAMUSCULAR; SUBCUTANEOUS at 10:41

## 2023-03-08 NOTE — PROGRESS NOTES
[address]    NAME: Ellie Cross  AGE: 56 y o  SEX: female  : 1975     DATE: 3/8/2023     Assessment and Plan:     Problem List Items Addressed This Visit        Other    Iron deficiency    Relevant Orders    Ferritin    Annual physical exam - Primary     Mammogram up to date  Flu shot fall  Labs ordered         B12 deficiency     b12 today         Relevant Medications    cyanocobalamin injection 1,000 mcg (Start on 3/16/2023  9:00 AM)   Other Visit Diagnoses     Cold sore        Relevant Medications    valACYclovir (VALTREX) 1,000 mg tablet    Screening for diabetes mellitus        Relevant Orders    Hemoglobin A1C    Irregular periods        Relevant Orders    Ambulatory Referral to Obstetrics / Gynecology          Immunizations and preventive care screenings were discussed with patient today  Appropriate education was printed on patient's after visit summary  Counseling:  Dental Health: discussed importance of regular tooth brushing, flossing, and dental visits  BMI Counseling: Body mass index is 28 69 kg/m²  The BMI is above normal  Nutrition recommendations include encouraging healthy choices of fruits and vegetables  Exercise recommendations include exercising 3-5 times per week  No pharmacotherapy was ordered  Rationale for BMI follow-up plan is due to patient being overweight or obese  Return in 6 months (on 2023) for Recheck  Chief Complaint:     Chief Complaint   Patient presents with   • Physical Exam     Patient is being seen for an annual physical        History of Present Illness:     Adult Annual Physical   Patient here for a comprehensive physical exam  The patient reports problems - gained weight, intermittent fasting  Diet and Physical Activity  Diet/Nutrition: well balanced diet  Exercise: walking and 5-7 times a week on average        Depression Screening  PHQ-2/9 Depression Screening    Little interest or pleasure in doing things: 0 - not at all  Feeling down, depressed, or hopeless: 0 - not at all  Trouble falling or staying asleep, or sleeping too much: 3 - nearly every day  Feeling tired or having little energy: 0 - not at all  Poor appetite or overeatin - not at all  Feeling bad about yourself - or that you are a failure or have let yourself or your family down: 0 - not at all  Trouble concentrating on things, such as reading the newspaper or watching television: 0 - not at all  Moving or speaking so slowly that other people could have noticed  Or the opposite - being so fidgety or restless that you have been moving around a lot more than usual: 0 - not at all  Thoughts that you would be better off dead, or of hurting yourself in some way: 0 - not at all  PHQ-9 Score: 3   PHQ-9 Interpretation: No or Minimal depression        General Health  Sleep: sleeps poorly  Hearing: normal - bilateral   Vision: no vision problems  Dental: regular dental visits  /GYN Health  Patient is: premenopausal  Last menstrual period: irregular  Contraceptive method: n/a  Review of Systems:     Review of Systems   Constitutional: Positive for fatigue and unexpected weight change (weight gain)  HENT: Negative  Eyes: Negative  Respiratory: Negative  Cardiovascular: Negative  Gastrointestinal: Negative  Endocrine: Negative  Genitourinary: Negative  Musculoskeletal: Negative  Skin: Negative  Hair loss   Allergic/Immunologic: Negative  Neurological: Negative  Hematological: Negative  Psychiatric/Behavioral: Negative         Past Medical History:     Past Medical History:   Diagnosis Date   • Arthritis    • Asthma    • Breast lump     resolved [id number]   • Depression    • Disease of thyroid gland    • Hair loss    • Hemorrhage of conjunctiva, left     last assessed    • Knee joint pain     right   • Lyme disease     lastt assessed  • Migraine    • Thyroid disease       Past Surgical History:     Past Surgical History:   Procedure Laterality Date   • LUMBAR FUSION  2004    Lumbar Vertebral Fusion;     • ME HYSTEROSCOPY BX ENDOMETRIUM&/POLYPC W/WO D&C N/A 2022    Procedure: DILATATION AND CURETTAGE (D&C) WITH HYSTEROSCOPY;  Surgeon: Igor Thompson MD;  Location: [facility];  Service: Gynecology   • ME HYSTEROSCOPY ENDOMETRIAL ABLATION N/A 2022    Procedure: Christiano Benjamin;  Surgeon: Igor Thompson MD;  Location: [facility];  Service: Gynecology   • ME LAPAROSCOPY W/RMVL ADNEXAL STRUCTURES Bilateral 2022    Procedure: SALPINGECTOMY, LAPAROSCOPIC;  Surgeon: Igor Thompson MD;  Location: [facility];  Service: Gynecology   • WISDOM TOOTH EXTRACTION        Social History:     Social History     Socioeconomic History   • Marital status:      Spouse name: None   • Number of children: None   • Years of education: None   • Highest education level: None   Occupational History   • None   Tobacco Use   • Smoking status: Never   • Smokeless tobacco: Never   Vaping Use   • Vaping Use: Never used   Substance and Sexual Activity   • Alcohol use: Not Currently     Comment: occ   • Drug use: No   • Sexual activity: Yes     Partners: Male   Other Topics Concern   • None   Social History Narrative    Caffeine use     Social Determinants of Health     Financial Resource Strain: Not on file   Food Insecurity: Not on file   Transportation Needs: Not on file   Physical Activity: Not on file   Stress: Not on file   Social Connections: Not on file   Intimate Partner Violence: Not on file   Housing Stability: Not on file      Family History:     Family History   Problem Relation Age of Onset   • Atrial fibrillation Mother    • Hyperlipidemia Mother    • Hypertension Mother    • Supraventricular tachycardia Mother    • Heart disease Mother    • Diabetes Father            • Hyperlipidemia Father    • Hypertension Father            • Arthritis Maternal Grandmother    • Leukemia Maternal Grandmother    • Cancer Maternal Grandmother         blood ca   • Stroke Maternal Grandfather            • Pancreatic cancer Maternal Grandfather 72   • No Known Problems Sister    • No Known Problems Paternal Grandmother    • No Known Problems Paternal Grandfather    • No Known Problems Sister    • No Known Problems Paternal Aunt    • No Known Problems Paternal Aunt    • No Known Problems Paternal Aunt       Current Medications:     Current Outpatient Medications   Medication Sig Dispense Refill   • acetaminophen (TYLENOL) 500 mg tablet Take 2 tablets (1,000 mg total) by mouth every 6 (six) hours as needed for mild pain or moderate pain 30 tablet 0   • albuterol (ProAir HFA) 90 mcg/act inhaler Inhale 2 puffs every 6 (six) hours as needed for wheezing 8 5 g 0   • ALPRAZolam (XANAX) 0 5 mg tablet Take 1 tablet (0 5 mg total) by mouth daily as needed for anxiety 30 tablet 0   • Cholecalciferol (Vitamin D3) 1 25 MG (34543 UT) CAPS      • Diclofenac Sodium (VOLTAREN) 1 % Apply 2 g topically 4 (four) times a day (Patient taking differently: Apply 2 g topically 4 (four) times a day as needed) 100 g 1   • ergocalciferol (VITAMIN D2) 50,000 units Take 1 capsule (50,000 Units total) by mouth once a week 12 capsule 0   • ibuprofen (MOTRIN) 600 mg tablet Take 1 tablet (600 mg total) by mouth every 6 (six) hours as needed for mild pain or moderate pain 30 tablet 0   • levothyroxine 125 mcg tablet Take 1 tablet (125 mcg total) by mouth daily 90 tablet 0   • valACYclovir (VALTREX) 1,000 mg tablet Take 1 tablet (1,000 mg total) by mouth 2 (two) times a day for 5 days 10 tablet 5   • scopolamine (TRANSDERM-SCOP) 1 mg/3 days TD 72 hr patch Place 1 patch on the skin every third day (Patient not taking: Reported on 3/8/2023) 4 patch 0   • ZOLMitriptan (ZOMIG) 5 MG tablet Take 1 tablet (5 mg total) by mouth once as needed for migraine for up to 1 dose (Patient not taking: Reported on 3/8/2023) 6 tablet 0     Current Facility-Administered Medications   Medication Dose Route Frequency Provider Last Rate Last Admin   • [START ON 3/16/2023] cyanocobalamin injection 1,000 mcg  1,000 mcg Intramuscular Q30 Days Hayley Cartagena DO   1,000 mcg at 03/08/23 1041      Allergies: Allergies   Allergen Reactions   • Pollen Extract       Physical Exam:     /88 (BP Location: Left arm, Patient Position: Sitting, Cuff Size: Adult)   Pulse 82   Temp 98 1 °F (36 7 °C) (Tympanic)   Resp 16   Ht 5' 8 35" (1 736 m)   Wt 86 5 kg (190 lb 9 6 oz)   SpO2 98%   BMI 28 69 kg/m²     Physical Exam  Vitals and nursing note reviewed  Constitutional:       Appearance: Normal appearance  She is well-developed  HENT:      Head: Normocephalic and atraumatic  Right Ear: External ear normal       Left Ear: External ear normal       Nose: Nose normal    Eyes:      Extraocular Movements: Extraocular movements intact  Conjunctiva/sclera: Conjunctivae normal       Pupils: Pupils are equal, round, and reactive to light  Cardiovascular:      Rate and Rhythm: Normal rate and regular rhythm  Heart sounds: Normal heart sounds  Pulmonary:      Effort: Pulmonary effort is normal       Breath sounds: Normal breath sounds  Abdominal:      General: Abdomen is flat  Bowel sounds are normal       Palpations: Abdomen is soft  Musculoskeletal:         General: Normal range of motion  Cervical back: Normal range of motion and neck supple  Skin:     General: Skin is warm and dry  Capillary Refill: Capillary refill takes less than 2 seconds  Neurological:      General: No focal deficit present  Mental Status: She is alert and oriented to person, place, and time  Psychiatric:         Mood and Affect: Mood normal          Behavior: Behavior normal          Thought Content:  Thought content normal          Judgment: Judgment normal  Cornelio Pascual DO  [address] [facility]

## 2023-03-23 ENCOUNTER — HOSPITAL ENCOUNTER (OUTPATIENT)
Dept: RADIOLOGY | Age: 48
Discharge: HOME | End: 2023-03-23

## 2023-03-23 VITALS — HEIGHT: 68 IN | WEIGHT: 190 LBS | BODY MASS INDEX: 28.79 KG/M2

## 2023-03-23 DIAGNOSIS — Z12.31 ENCOUNTER FOR SCREENING MAMMOGRAM FOR MALIGNANT NEOPLASM OF BREAST: ICD-10-CM

## 2023-03-23 DIAGNOSIS — Z01.419 WOMEN'S ANNUAL ROUTINE GYNECOLOGICAL EXAMINATION: ICD-10-CM

## 2023-03-30 ENCOUNTER — OFFICE VISIT (OUTPATIENT)
Dept: OBGYN CLINIC | Facility: CLINIC | Age: 48
End: 2023-03-30

## 2023-03-30 VITALS — WEIGHT: 195 LBS | SYSTOLIC BLOOD PRESSURE: 110 MMHG | BODY MASS INDEX: 29.65 KG/M2 | DIASTOLIC BLOOD PRESSURE: 74 MMHG

## 2023-03-30 DIAGNOSIS — N92.6 IRREGULAR PERIODS: ICD-10-CM

## 2023-03-30 DIAGNOSIS — R63.5 WEIGHT GAIN: Primary | ICD-10-CM

## 2023-03-30 DIAGNOSIS — G47.9 SLEEP DISTURBANCE: ICD-10-CM

## 2023-03-30 DIAGNOSIS — L65.9 HAIR LOSS: ICD-10-CM

## 2023-03-30 PROBLEM — Z00.00 ANNUAL PHYSICAL EXAM: Status: RESOLVED | Noted: 2022-01-20 | Resolved: 2023-03-30

## 2023-03-30 PROBLEM — Z01.419 GYNECOLOGIC EXAM NORMAL: Status: RESOLVED | Noted: 2020-03-11 | Resolved: 2023-03-30

## 2023-03-30 PROBLEM — Z48.89 ENCOUNTER FOR POSTOPERATIVE CARE: Status: RESOLVED | Noted: 2022-02-23 | Resolved: 2023-03-30
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
## 2023-04-04 ENCOUNTER — APPOINTMENT (OUTPATIENT)
Dept: LAB | Facility: AMBULARY SURGERY CENTER | Age: 48
End: 2023-04-04

## 2023-04-04 DIAGNOSIS — L65.9 HAIR LOSS: ICD-10-CM

## 2023-04-04 DIAGNOSIS — R63.5 WEIGHT GAIN: ICD-10-CM

## 2023-04-04 LAB
CRP SERPL HS-MCNC: 1.32 MG/L
T3FREE SERPL-MCNC: 2.57 PG/ML (ref 2.3–4.2)
TESTOST SERPL-MCNC: 20 NG/DL

## 2023-04-05 LAB
DHEA-S SERPL-MCNC: 81.6 UG/DL (ref 41.2–243.7)
THYROPEROXIDASE AB SERPL-ACNC: 10 IU/ML (ref 0–34)

## 2023-04-06 ENCOUNTER — CLINICAL SUPPORT (OUTPATIENT)
Dept: FAMILY MEDICINE CLINIC | Facility: CLINIC | Age: 48
End: 2023-04-06

## 2023-04-06 DIAGNOSIS — E53.8 B12 DEFICIENCY: Primary | ICD-10-CM

## 2023-04-06 LAB — TSI SER-ACNC: <0.1 IU/L (ref 0–0.55)

## 2023-04-06 RX ADMIN — CYANOCOBALAMIN 1000 MCG: 1000 INJECTION, SOLUTION INTRAMUSCULAR; SUBCUTANEOUS at 09:21

## 2023-04-06 NOTE — PROGRESS NOTES
Name: Nessa Gibson      : 1975      MRN: [id number]  Encounter Provider: Nurse Venita RAMON  Encounter Date: 2023   Encounter department: [place] [address]   B12 deficiency           Subjective      HPI  Review of Systems    Current Outpatient Medications on File Prior to Visit   Medication Sig   • acetaminophen (TYLENOL) 500 mg tablet Take 2 tablets (1,000 mg total) by mouth every 6 (six) hours as needed for mild pain or moderate pain   • albuterol (ProAir HFA) 90 mcg/act inhaler Inhale 2 puffs every 6 (six) hours as needed for wheezing   • ALPRAZolam (XANAX) 0 5 mg tablet Take 1 tablet (0 5 mg total) by mouth daily as needed for anxiety   • Cholecalciferol (Vitamin D3) 1 25 MG (00776 UT) CAPS    • Diclofenac Sodium (VOLTAREN) 1 % Apply 2 g topically 4 (four) times a day (Patient taking differently: Apply 2 g topically 4 (four) times a day as needed)   • ergocalciferol (VITAMIN D2) 50,000 units Take 1 capsule (50,000 Units total) by mouth once a week   • ibuprofen (MOTRIN) 600 mg tablet Take 1 tablet (600 mg total) by mouth every 6 (six) hours as needed for mild pain or moderate pain   • levothyroxine 125 mcg tablet Take 1 tablet (125 mcg total) by mouth daily   • scopolamine (TRANSDERM-SCOP) 1 mg/3 days TD 72 hr patch Place 1 patch on the skin every third day   • valACYclovir (VALTREX) 1,000 mg tablet Take 1 tablet (1,000 mg total) by mouth 2 (two) times a day for 5 days   • ZOLMitriptan (ZOMIG) 5 MG tablet Take 1 tablet (5 mg total) by mouth once as needed for migraine for up to 1 dose       Objective     LMP 2023 (Approximate)     Nurse Venita RAMON

## 2023-05-08 DIAGNOSIS — E03.9 HYPOTHYROIDISM, UNSPECIFIED TYPE: ICD-10-CM

## 2023-05-08 DIAGNOSIS — F41.1 GENERALIZED ANXIETY DISORDER: ICD-10-CM

## 2023-05-08 RX ORDER — LEVOTHYROXINE SODIUM 0.12 MG/1
125 TABLET ORAL DAILY
Qty: 90 TABLET | Refills: 3 | Status: SHIPPED | OUTPATIENT
Start: 2023-05-08

## 2023-05-08 RX ORDER — ALPRAZOLAM 0.5 MG/1
0.5 TABLET ORAL DAILY PRN
Qty: 30 TABLET | Refills: 0 | Status: SHIPPED | OUTPATIENT
Start: 2023-05-08

## 2023-05-09 ENCOUNTER — TELEPHONE (OUTPATIENT)
Dept: FAMILY MEDICINE CLINIC | Facility: CLINIC | Age: 48
End: 2023-05-09

## 2023-05-09 ENCOUNTER — CLINICAL SUPPORT (OUTPATIENT)
Dept: FAMILY MEDICINE CLINIC | Facility: CLINIC | Age: 48
End: 2023-05-09

## 2023-05-09 DIAGNOSIS — T75.3XXS MOTION SICKNESS, SEQUELA: Primary | ICD-10-CM

## 2023-05-09 DIAGNOSIS — E53.8 B12 DEFICIENCY: Primary | ICD-10-CM

## 2023-05-09 RX ORDER — SCOLOPAMINE TRANSDERMAL SYSTEM 1 MG/1
1 PATCH, EXTENDED RELEASE TRANSDERMAL
Qty: 4 PATCH | Refills: 0 | Status: SHIPPED | OUTPATIENT
Start: 2023-05-09

## 2023-05-09 RX ADMIN — CYANOCOBALAMIN 1000 MCG: 1000 INJECTION, SOLUTION INTRAMUSCULAR; SUBCUTANEOUS at 09:11

## 2023-05-09 NOTE — TELEPHONE ENCOUNTER
Patient in today for a nurse visit  She is going on a 7 day cruise requesting a prescription for the Scopolamine patches sent to [facility]

## 2023-05-09 NOTE — PROGRESS NOTES
Name: Terrence Loaiza      : 1975      MRN: [id number]  Encounter Provider: Padilla Akhtar  Encounter Date: 2023   Encounter department: [place] [address]  B12 deficiency           Subjective      HPI  Review of Systems    Current Outpatient Medications on File Prior to Visit   Medication Sig   • acetaminophen (TYLENOL) 500 mg tablet Take 2 tablets (1,000 mg total) by mouth every 6 (six) hours as needed for mild pain or moderate pain   • albuterol (ProAir HFA) 90 mcg/act inhaler Inhale 2 puffs every 6 (six) hours as needed for wheezing   • ALPRAZolam (XANAX) 0 5 mg tablet Take 1 tablet (0 5 mg total) by mouth daily as needed for anxiety   • Cholecalciferol (Vitamin D3) 1 25 MG (53693 UT) CAPS    • Diclofenac Sodium (VOLTAREN) 1 % Apply 2 g topically 4 (four) times a day (Patient taking differently: Apply 2 g topically 4 (four) times a day as needed)   • ergocalciferol (VITAMIN D2) 50,000 units Take 1 capsule (50,000 Units total) by mouth once a week   • ibuprofen (MOTRIN) 600 mg tablet Take 1 tablet (600 mg total) by mouth every 6 (six) hours as needed for mild pain or moderate pain   • levothyroxine 125 mcg tablet Take 1 tablet (125 mcg total) by mouth daily   • scopolamine (TRANSDERM-SCOP) 1 mg/3 days TD 72 hr patch Place 1 patch on the skin every third day   • valACYclovir (VALTREX) 1,000 mg tablet Take 1 tablet (1,000 mg total) by mouth 2 (two) times a day for 5 days   • ZOLMitriptan (ZOMIG) 5 MG tablet Take 1 tablet (5 mg total) by mouth once as needed for migraine for up to 1 dose       Objective     There were no vitals taken for this visit      Physical Exam  Padilla Akhtar

## 2023-06-08 ENCOUNTER — CLINICAL SUPPORT (OUTPATIENT)
Dept: FAMILY MEDICINE CLINIC | Facility: CLINIC | Age: 48
End: 2023-06-08
Payer: COMMERCIAL

## 2023-06-08 DIAGNOSIS — E53.8 B12 DEFICIENCY: Primary | ICD-10-CM

## 2023-06-08 PROCEDURE — 96372 THER/PROPH/DIAG INJ SC/IM: CPT

## 2023-06-08 RX ADMIN — CYANOCOBALAMIN 1000 MCG: 1000 INJECTION, SOLUTION INTRAMUSCULAR; SUBCUTANEOUS at 11:45

## 2023-06-08 NOTE — PROGRESS NOTES
Name: Tawanna Senderika      : 1975      MRN: [id number]  Encounter Provider: Karla Barclay  Encounter Date: 2023   Encounter department: [place] [address]  B12 deficiency           Subjective      HPI  Review of Systems    Current Outpatient Medications on File Prior to Visit   Medication Sig   • scopolamine (TRANSDERM-SCOP) 1 mg/3 days TD 72 hr patch Place 1 patch on the skin over 72 hours every third day   • acetaminophen (TYLENOL) 500 mg tablet Take 2 tablets (1,000 mg total) by mouth every 6 (six) hours as needed for mild pain or moderate pain   • albuterol (ProAir HFA) 90 mcg/act inhaler Inhale 2 puffs every 6 (six) hours as needed for wheezing   • ALPRAZolam (XANAX) 0 5 mg tablet Take 1 tablet (0 5 mg total) by mouth daily as needed for anxiety   • Cholecalciferol (Vitamin D3) 1 25 MG (74012 UT) CAPS    • Diclofenac Sodium (VOLTAREN) 1 % Apply 2 g topically 4 (four) times a day (Patient taking differently: Apply 2 g topically 4 (four) times a day as needed)   • ergocalciferol (VITAMIN D2) 50,000 units Take 1 capsule (50,000 Units total) by mouth once a week   • ibuprofen (MOTRIN) 600 mg tablet Take 1 tablet (600 mg total) by mouth every 6 (six) hours as needed for mild pain or moderate pain   • levothyroxine 125 mcg tablet Take 1 tablet (125 mcg total) by mouth daily   • scopolamine (TRANSDERM-SCOP) 1 mg/3 days TD 72 hr patch Place 1 patch on the skin every third day   • valACYclovir (VALTREX) 1,000 mg tablet Take 1 tablet (1,000 mg total) by mouth 2 (two) times a day for 5 days   • ZOLMitriptan (ZOMIG) 5 MG tablet Take 1 tablet (5 mg total) by mouth once as needed for migraine for up to 1 dose       Objective     There were no vitals taken for this visit      Karla Barclay

## 2023-08-08 ENCOUNTER — CLINICAL SUPPORT (OUTPATIENT)
Dept: FAMILY MEDICINE CLINIC | Facility: CLINIC | Age: 48
End: 2023-08-08
Payer: COMMERCIAL

## 2023-08-08 DIAGNOSIS — E53.8 B12 DEFICIENCY: Primary | ICD-10-CM

## 2023-08-08 PROCEDURE — 96372 THER/PROPH/DIAG INJ SC/IM: CPT

## 2023-08-08 RX ADMIN — CYANOCOBALAMIN 1000 MCG: 1000 INJECTION, SOLUTION INTRAMUSCULAR; SUBCUTANEOUS at 09:03

## 2023-08-08 NOTE — PROGRESS NOTES
Name: Tay Castle      : 1975      MRN: [id number]  Encounter Provider: Inge Hernandez  Encounter Date: 2023   Encounter department: [facility]     1. B12 deficiency           Subjective      HPI  Review of Systems    Current Outpatient Medications on File Prior to Visit   Medication Sig   • scopolamine (TRANSDERM-SCOP) 1 mg/3 days TD 72 hr patch Place 1 patch on the skin over 72 hours every third day   • acetaminophen (TYLENOL) 500 mg tablet Take 2 tablets (1,000 mg total) by mouth every 6 (six) hours as needed for mild pain or moderate pain   • albuterol (ProAir HFA) 90 mcg/act inhaler Inhale 2 puffs every 6 (six) hours as needed for wheezing   • ALPRAZolam (XANAX) 0.5 mg tablet Take 1 tablet (0.5 mg total) by mouth daily as needed for anxiety   • Cholecalciferol (Vitamin D3) 1.25 MG (38773 UT) CAPS    • Diclofenac Sodium (VOLTAREN) 1 % Apply 2 g topically 4 (four) times a day (Patient taking differently: Apply 2 g topically 4 (four) times a day as needed)   • ergocalciferol (VITAMIN D2) 50,000 units Take 1 capsule (50,000 Units total) by mouth once a week   • ibuprofen (MOTRIN) 600 mg tablet Take 1 tablet (600 mg total) by mouth every 6 (six) hours as needed for mild pain or moderate pain   • levothyroxine 125 mcg tablet Take 1 tablet (125 mcg total) by mouth daily   • scopolamine (TRANSDERM-SCOP) 1 mg/3 days TD 72 hr patch Place 1 patch on the skin every third day   • valACYclovir (VALTREX) 1,000 mg tablet Take 1 tablet (1,000 mg total) by mouth 2 (two) times a day for 5 days   • ZOLMitriptan (ZOMIG) 5 MG tablet Take 1 tablet (5 mg total) by mouth once as needed for migraine for up to 1 dose       Objective     There were no vitals taken for this visit.     Inge Hernandez

## 2023-08-10 ENCOUNTER — OFFICE VISIT (OUTPATIENT)
Dept: FAMILY MEDICINE CLINIC | Facility: CLINIC | Age: 48
End: 2023-08-10
Payer: COMMERCIAL

## 2023-08-10 VITALS
OXYGEN SATURATION: 98 % | HEART RATE: 75 BPM | WEIGHT: 198 LBS | HEIGHT: 68 IN | DIASTOLIC BLOOD PRESSURE: 80 MMHG | SYSTOLIC BLOOD PRESSURE: 108 MMHG | RESPIRATION RATE: 18 BRPM | TEMPERATURE: 97.2 F | BODY MASS INDEX: 30.01 KG/M2

## 2023-08-10 DIAGNOSIS — T14.8XXA BRUISE: Primary | ICD-10-CM

## 2023-08-10 DIAGNOSIS — E03.9 HYPOTHYROIDISM, UNSPECIFIED TYPE: ICD-10-CM

## 2023-08-10 PROCEDURE — 99213 OFFICE O/P EST LOW 20 MIN: CPT | Performed by: NURSE PRACTITIONER

## 2023-08-10 RX ORDER — LEVOTHYROXINE SODIUM 0.12 MG/1
125 TABLET ORAL DAILY
Qty: 90 TABLET | Refills: 0 | Status: SHIPPED | OUTPATIENT
Start: 2023-08-10

## 2023-08-10 NOTE — ASSESSMENT & PLAN NOTE
Initial localized reaction in pt photo consistent with a bite, now with a round, flat bruise. Pain is improving, no warmth or surrounding erythema. No systemic symptoms. At present, seems like a localized response to a bite or sting. For now, continue to monitor. If symptoms change or develop she should call for follow up.

## 2023-08-10 NOTE — PROGRESS NOTES
Name: Felicia Darden      : 1975      MRN: [id number]  Encounter Provider: MIKE Carballo  Encounter Date: 8/10/2023   Encounter department: [facility]         1. Bruise  Assessment & Plan:  Initial localized reaction in pt photo consistent with a bite, now with a round, flat bruise. Pain is improving, no warmth or surrounding erythema. No systemic symptoms. At present, seems like a localized response to a bite or sting. For now, continue to monitor. If symptoms change or develop she should call for follow up. 2. Hypothyroidism, unspecified type  -     levothyroxine 125 mcg tablet; Take 1 tablet (125 mcg total) by mouth daily         Subjective      Pt is a 56 y.o. y/o female who is seen today for evaluation of a bruise. She states she was at her sister's house  and was outdoors in the yard and that evening she noticed what looked like a bite of some sort on the left lateral thigh, just above her knee. The area was raised and very painful. It was not itchy. She notes she had a headache and a pain in her low back, both of which went away. The next day when she woke up with a round bruise around the area. She still has the bruise, it is not as tender anymore. No fever, chills, fatigue. Review of Systems   Constitutional: Negative for appetite change, chills, diaphoresis, fatigue and fever. Musculoskeletal: Negative for arthralgias and back pain. Skin: Positive for color change. Negative for wound. Neurological: Negative for weakness, numbness and headaches.        Current Outpatient Medications on File Prior to Visit   Medication Sig   • acetaminophen (TYLENOL) 500 mg tablet Take 2 tablets (1,000 mg total) by mouth every 6 (six) hours as needed for mild pain or moderate pain   • albuterol (ProAir HFA) 90 mcg/act inhaler Inhale 2 puffs every 6 (six) hours as needed for wheezing   • ALPRAZolam (XANAX) 0.5 mg tablet Take 1 tablet (0.5 mg total) by mouth daily as needed for anxiety   • Diclofenac Sodium (VOLTAREN) 1 % Apply 2 g topically 4 (four) times a day (Patient taking differently: Apply 2 g topically 4 (four) times a day as needed)   • ergocalciferol (VITAMIN D2) 50,000 units Take 1 capsule (50,000 Units total) by mouth once a week   • ibuprofen (MOTRIN) 600 mg tablet Take 1 tablet (600 mg total) by mouth every 6 (six) hours as needed for mild pain or moderate pain   • scopolamine (TRANSDERM-SCOP) 1 mg/3 days TD 72 hr patch Place 1 patch on the skin over 72 hours every third day (Patient not taking: Reported on 8/10/2023)   • ZOLMitriptan (ZOMIG) 5 MG tablet Take 1 tablet (5 mg total) by mouth once as needed for migraine for up to 1 dose   • [DISCONTINUED] levothyroxine 125 mcg tablet Take 1 tablet (125 mcg total) by mouth daily   • Cholecalciferol (Vitamin D3) 1.25 MG (06095 UT) CAPS  (Patient not taking: Reported on 8/10/2023)   • scopolamine (TRANSDERM-SCOP) 1 mg/3 days TD 72 hr patch Place 1 patch on the skin every third day (Patient not taking: Reported on 8/10/2023)   • valACYclovir (VALTREX) 1,000 mg tablet Take 1 tablet (1,000 mg total) by mouth 2 (two) times a day for 5 days (Patient not taking: Reported on 8/10/2023)       Objective     /80 (BP Location: Left arm, Patient Position: Sitting, Cuff Size: Large)   Pulse 75   Temp (!) 97.2 °F (36.2 °C) (Tympanic)   Resp 18   Ht 5' 8" (1.727 m)   Wt 89.8 kg (198 lb)   SpO2 98%   BMI 30.11 kg/m²     Physical Exam  Vitals reviewed. Constitutional:       General: She is awake. She is not in acute distress. Appearance: Normal appearance. She is well-developed and well-groomed. She is not ill-appearing or diaphoretic. Eyes:      General: Lids are normal.      Conjunctiva/sclera: Conjunctivae normal.   Pulmonary:      Effort: Pulmonary effort is normal.   Skin:     General: Skin is dry.       Findings: Ecchymosis (photo above, annular bruise left lateral thigh 5. 5x4cm) present. No erythema or wound. Neurological:      Mental Status: She is alert and oriented to person, place, and time. Psychiatric:         Attention and Perception: Attention normal.         Mood and Affect: Mood normal.         Speech: Speech normal.         Behavior: Behavior normal. Behavior is cooperative. Thought Content:  Thought content normal.         Cognition and Memory: Cognition normal.         Judgment: Judgment normal.       MIKE Lomeli

## 2023-08-31 ENCOUNTER — OFFICE VISIT (OUTPATIENT)
Dept: OBGYN CLINIC | Facility: CLINIC | Age: 48
End: 2023-08-31
Payer: COMMERCIAL

## 2023-08-31 VITALS
SYSTOLIC BLOOD PRESSURE: 121 MMHG | HEIGHT: 68 IN | BODY MASS INDEX: 30.01 KG/M2 | DIASTOLIC BLOOD PRESSURE: 78 MMHG | WEIGHT: 198 LBS | HEART RATE: 79 BPM

## 2023-08-31 DIAGNOSIS — M17.11 PRIMARY OSTEOARTHRITIS OF RIGHT KNEE: Primary | ICD-10-CM

## 2023-08-31 DIAGNOSIS — M25.561 RIGHT KNEE PAIN, UNSPECIFIED CHRONICITY: ICD-10-CM

## 2023-08-31 PROCEDURE — 99213 OFFICE O/P EST LOW 20 MIN: CPT | Performed by: ORTHOPAEDIC SURGERY

## 2023-08-31 NOTE — PROGRESS NOTES
Assessment:       1. Primary osteoarthritis of right knee    2. Right knee pain, unspecified chronicity          Plan:        Explained the current clinical findings to Ellie. She now has a recurrence of the right knee pain secondary to knee osteoarthritis. In the past, she did have good relief of symptoms following the ultrasound-guided viscosupplementation injection and is interested in repeating the same. Her current right knee pain intensity is 7/10. I will follow-up with her after insurance approval for the right knee viscosupplementation injection. Subjective:     Patient ID: Tavo Rivas is a 56 y.o. female. Chief Complaint:    TRINY Greenfield is a 67-year-old lady who presents today for a follow-up of her right knee pain. Last seen in this regard on 5/25/2022 and received right knee intra-articular ultrasound-guided viscosupplementation injection for history of patellofemoral arthritis. Today, the patient reports that she had good relief of symptoms following the right knee viscosupplementation injection last year. However, recently her right knee pain has gradually been coming back over the past several weeks. Currently rates the right knee pain as 7/10 at its worse. Symptoms are made worse with ambulation. Denies any knee instability or significant mechanical symptoms at this time. No new trauma reported.     Past Medical History:   Diagnosis Date   • Arthritis    • Asthma    • Breast lump     resolved [id number]   • Depression    • Disease of thyroid gland    • Hair loss    • Hemorrhage of conjunctiva, left     last assessed [id number]   • Knee joint pain     right   • Lyme disease     lastt assessed [id number]   • Migraine    • Thyroid disease      Past Surgical History:   Procedure Laterality Date   • LUMBAR FUSION  01/01/2004    Lumbar Vertebral Fusion;  2004   • [place] HYSTEROSCOPY BX ENDOMETRIUM&/POLYPC W/WO D&C N/A 2/16/2022    Procedure: DILATATION AND CURETTAGE (D&C) WITH HYSTEROSCOPY;  Surgeon: Ijeoma Austin MD;  Location: [facility];  Service: Gynecology   • MD HYSTEROSCOPY ENDOMETRIAL ABLATION N/A 2/16/2022    Procedure: Tatianna Link;  Surgeon: Ijeoma Austin MD;  Location: [facility];  Service: Gynecology   • MD LAPAROSCOPY W/RMVL ADNEXAL STRUCTURES Bilateral 2/16/2022    Procedure: SALPINGECTOMY, LAPAROSCOPIC;  Surgeon: Ijeoma Austin MD;  Location: [facility];  Service: Gynecology   • WISDOM TOOTH EXTRACTION       Allergies   Allergen Reactions   • Pollen Extract      Current Outpatient Medications on File Prior to Visit   Medication Sig Dispense Refill   • acetaminophen (TYLENOL) 500 mg tablet Take 2 tablets (1,000 mg total) by mouth every 6 (six) hours as needed for mild pain or moderate pain 30 tablet 0   • albuterol (ProAir HFA) 90 mcg/act inhaler Inhale 2 puffs every 6 (six) hours as needed for wheezing 8.5 g 0   • ALPRAZolam (XANAX) 0.5 mg tablet Take 1 tablet (0.5 mg total) by mouth daily as needed for anxiety 30 tablet 0   • Diclofenac Sodium (VOLTAREN) 1 % Apply 2 g topically 4 (four) times a day (Patient taking differently: Apply 2 g topically 4 (four) times a day as needed) 100 g 1   • ergocalciferol (VITAMIN D2) 50,000 units Take 1 capsule (50,000 Units total) by mouth once a week 12 capsule 0   • ibuprofen (MOTRIN) 600 mg tablet Take 1 tablet (600 mg total) by mouth every 6 (six) hours as needed for mild pain or moderate pain 30 tablet 0   • levothyroxine 125 mcg tablet Take 1 tablet (125 mcg total) by mouth daily 90 tablet 0   • scopolamine (TRANSDERM-SCOP) 1 mg/3 days TD 72 hr patch Place 1 patch on the skin over 72 hours every third day (Patient not taking: Reported on 8/10/2023) 4 patch 0   • ZOLMitriptan (ZOMIG) 5 MG tablet Take 1 tablet (5 mg total) by mouth once as needed for migraine for up to 1 dose 6 tablet 0   • Cholecalciferol (Vitamin D3) 1.25 MG (56142 UT) CAPS  (Patient not taking: Reported on 8/10/2023)     • scopolamine (TRANSDERM-SCOP) 1 mg/3 days TD 72 hr patch Place 1 patch on the skin every third day (Patient not taking: Reported on 8/10/2023) 4 patch 0   • valACYclovir (VALTREX) 1,000 mg tablet Take 1 tablet (1,000 mg total) by mouth 2 (two) times a day for 5 days (Patient not taking: Reported on 8/10/2023) 10 tablet 5     Current Facility-Administered Medications on File Prior to Visit   Medication Dose Route Frequency Provider Last Rate Last Admin   • cyanocobalamin injection 1,000 mcg  1,000 mcg Intramuscular Q30 Days Mays Gains, DO   1,000 mcg at 08/08/23 0903          Social History     Occupational History   • Not on file   Tobacco Use   • Smoking status: Never     Passive exposure: Never   • Smokeless tobacco: Never   Vaping Use   • Vaping Use: Never used   Substance and Sexual Activity   • Alcohol use: Not Currently     Comment: occ   • Drug use: No   • Sexual activity: Yes     Partners: Male      Review of Systems        Objective:     Right Knee Exam     Tenderness   The patient is experiencing tenderness in the medial joint line and lateral joint line (Patellofemoral crepitus). Range of Motion   Extension: normal   Flexion: 130     Tests   Nicolle:  Medial - negative Lateral - negative  Varus: negative Valgus: negative  Lachman:  Anterior - negative      Drawer:  Anterior - negative    Posterior - negative    Other   Erythema: absent  Sensation: normal  Swelling: none  Effusion: no effusion present          Observations     Right Knee   Negative for effusion. Physical Exam  Vitals and nursing note reviewed. Constitutional:       Appearance: She is well-developed. HENT:      Head: Normocephalic and atraumatic. Eyes:      Conjunctiva/sclera: Conjunctivae normal.      Pupils: Pupils are equal, round, and reactive to light. Cardiovascular:      Rate and Rhythm: Normal rate and regular rhythm. Pulmonary:      Effort: Pulmonary effort is normal. No respiratory distress.    Musculoskeletal: Right knee: No effusion. Instability Tests: Medial Nicolle test negative and lateral Nicolle test negative. Skin:     General: Skin is warm and dry. Findings: No erythema. Neurological:      Mental Status: She is alert and oriented to person, place, and time. Cranial Nerves: No cranial nerve deficit. Psychiatric:         Behavior: Behavior normal.         Thought Content: Thought content normal.         Judgment: Judgment normal.           I have personally reviewed pertinent films in PACS.

## 2023-09-06 ENCOUNTER — OFFICE VISIT (OUTPATIENT)
Dept: FAMILY MEDICINE CLINIC | Facility: CLINIC | Age: 48
End: 2023-09-06
Payer: COMMERCIAL

## 2023-09-06 VITALS
TEMPERATURE: 98.1 F | HEIGHT: 69 IN | RESPIRATION RATE: 16 BRPM | WEIGHT: 200 LBS | OXYGEN SATURATION: 98 % | DIASTOLIC BLOOD PRESSURE: 70 MMHG | BODY MASS INDEX: 29.62 KG/M2 | SYSTOLIC BLOOD PRESSURE: 100 MMHG | HEART RATE: 95 BPM

## 2023-09-06 DIAGNOSIS — E03.9 ACQUIRED HYPOTHYROIDISM: Primary | ICD-10-CM

## 2023-09-06 DIAGNOSIS — E53.8 B12 DEFICIENCY: ICD-10-CM

## 2023-09-06 DIAGNOSIS — E78.5 MILD HYPERLIPIDEMIA: ICD-10-CM

## 2023-09-06 PROBLEM — T14.8XXA BRUISE: Status: RESOLVED | Noted: 2023-08-10 | Resolved: 2023-09-06

## 2023-09-06 PROCEDURE — 99214 OFFICE O/P EST MOD 30 MIN: CPT | Performed by: FAMILY MEDICINE

## 2023-09-06 PROCEDURE — 96372 THER/PROPH/DIAG INJ SC/IM: CPT

## 2023-09-06 RX ORDER — CYANOCOBALAMIN 1000 UG/ML
1000 INJECTION, SOLUTION INTRAMUSCULAR; SUBCUTANEOUS
Status: SHIPPED | OUTPATIENT
Start: 2023-09-06

## 2023-09-06 RX ADMIN — CYANOCOBALAMIN 1000 MCG: 1000 INJECTION, SOLUTION INTRAMUSCULAR; SUBCUTANEOUS at 08:00

## 2023-09-06 RX ADMIN — CYANOCOBALAMIN 1000 MCG: 1000 INJECTION, SOLUTION INTRAMUSCULAR; SUBCUTANEOUS at 08:34

## 2023-09-06 NOTE — PROGRESS NOTES
Assessment/Plan:    1. Acquired hypothyroidism  Assessment & Plan:  Check tsh today    Orders:  -     TSH, 3rd generation with Free T4 reflex; Future    2. Mild hyperlipidemia  Assessment & Plan:  Check lipids    Orders:  -     Comprehensive metabolic panel; Future  -     Lipid Panel with Direct LDL reflex; Future    3. B12 deficiency  Assessment & Plan:  b12 today    Orders:  -     cyanocobalamin injection 1,000 mcg            There are no Patient Instructions on file for this visit. Return in about 6 months (around 3/6/2024) for Annual physical.    Subjective:      Patient ID: Shayy Gil is a 56 y.o. female. Chief Complaint   Patient presents with   • Follow-up     Patient here for 6 month follow up on Hypothyroidism        Here for follow up  Seeing Dr. Kristina Russell  Needs to do coritol testing  Hasn't had follow up yet  Labs reviewed  b12 was elevated since she went right after injection        The following portions of the patient's history were reviewed and updated as appropriate: allergies, current medications, past family history, past medical history, past social history, past surgical history and problem list.    Review of Systems   Constitutional: Negative. HENT: Negative. Eyes: Negative. Respiratory: Negative. Cardiovascular: Negative. Gastrointestinal: Negative. Endocrine: Negative. Genitourinary: Negative. Musculoskeletal: Negative. Allergic/Immunologic: Negative. Neurological: Negative. Hematological: Negative. Psychiatric/Behavioral: Negative.           Current Outpatient Medications   Medication Sig Dispense Refill   • acetaminophen (TYLENOL) 500 mg tablet Take 2 tablets (1,000 mg total) by mouth every 6 (six) hours as needed for mild pain or moderate pain 30 tablet 0   • albuterol (ProAir HFA) 90 mcg/act inhaler Inhale 2 puffs every 6 (six) hours as needed for wheezing 8.5 g 0   • ALPRAZolam (XANAX) 0.5 mg tablet Take 1 tablet (0.5 mg total) by mouth daily as needed for anxiety 30 tablet 0   • Diclofenac Sodium (VOLTAREN) 1 % Apply 2 g topically 4 (four) times a day (Patient taking differently: Apply 2 g topically 4 (four) times a day as needed) 100 g 1   • ergocalciferol (VITAMIN D2) 50,000 units Take 1 capsule (50,000 Units total) by mouth once a week 12 capsule 0   • ibuprofen (MOTRIN) 600 mg tablet Take 1 tablet (600 mg total) by mouth every 6 (six) hours as needed for mild pain or moderate pain 30 tablet 0   • levothyroxine 125 mcg tablet Take 1 tablet (125 mcg total) by mouth daily 90 tablet 0   • scopolamine (TRANSDERM-SCOP) 1 mg/3 days TD 72 hr patch Place 1 patch on the skin over 72 hours every third day (Patient not taking: Reported on 8/10/2023) 4 patch 0   • ZOLMitriptan (ZOMIG) 5 MG tablet Take 1 tablet (5 mg total) by mouth once as needed for migraine for up to 1 dose 6 tablet 0   • Cholecalciferol (Vitamin D3) 1.25 MG (03758 UT) CAPS  (Patient not taking: Reported on 8/10/2023)     • scopolamine (TRANSDERM-SCOP) 1 mg/3 days TD 72 hr patch Place 1 patch on the skin every third day (Patient not taking: Reported on 8/10/2023) 4 patch 0   • valACYclovir (VALTREX) 1,000 mg tablet Take 1 tablet (1,000 mg total) by mouth 2 (two) times a day for 5 days (Patient not taking: Reported on 8/10/2023) 10 tablet 5     Current Facility-Administered Medications   Medication Dose Route Frequency Provider Last Rate Last Admin   • cyanocobalamin injection 1,000 mcg  1,000 mcg Intramuscular Q30 Days Adeflo Lockwood DO   1,000 mcg at 09/06/23 0834       Objective:    /70 (BP Location: Left arm, Patient Position: Sitting, Cuff Size: Large)   Pulse 95   Temp 98.1 °F (36.7 °C) (Tympanic)   Resp 16   Ht 5' 8.74" (1.746 m)   Wt 90.7 kg (200 lb)   SpO2 98%   BMI 29.76 kg/m²        Physical Exam  Vitals and nursing note reviewed. Constitutional:       Appearance: She is well-developed. HENT:      Head: Normocephalic and atraumatic.       Nose: Nose normal. Eyes:      General: Lids are normal.      Conjunctiva/sclera: Conjunctivae normal.      Pupils: Pupils are equal, round, and reactive to light. Cardiovascular:      Rate and Rhythm: Normal rate and regular rhythm. Heart sounds: Normal heart sounds, S1 normal and S2 normal.   Pulmonary:      Effort: Pulmonary effort is normal.      Breath sounds: Normal breath sounds. Abdominal:      General: Bowel sounds are normal.      Palpations: Abdomen is soft. Musculoskeletal:         General: Normal range of motion. Cervical back: Normal range of motion and neck supple. Skin:     General: Skin is warm and dry. Neurological:      Mental Status: She is alert and oriented to person, place, and time. Deep Tendon Reflexes: Reflexes are normal and symmetric. Psychiatric:         Speech: Speech normal.         Behavior: Behavior normal.         Thought Content:  Thought content normal.         Judgment: Judgment normal.                Yandy Dee, DO

## 2023-09-14 ENCOUNTER — OFFICE VISIT (OUTPATIENT)
Dept: VASCULAR SURGERY | Facility: CLINIC | Age: 48
End: 2023-09-14
Payer: COMMERCIAL

## 2023-09-14 VITALS
DIASTOLIC BLOOD PRESSURE: 84 MMHG | HEIGHT: 68 IN | OXYGEN SATURATION: 98 % | RESPIRATION RATE: 18 BRPM | HEART RATE: 74 BPM | SYSTOLIC BLOOD PRESSURE: 122 MMHG | WEIGHT: 201 LBS | BODY MASS INDEX: 30.46 KG/M2

## 2023-09-14 DIAGNOSIS — M79.89 LEG SWELLING: Primary | ICD-10-CM

## 2023-09-14 DIAGNOSIS — I89.0 LYMPHEDEMA: ICD-10-CM

## 2023-09-14 PROCEDURE — 99204 OFFICE O/P NEW MOD 45 MIN: CPT | Performed by: NURSE PRACTITIONER

## 2023-09-14 NOTE — ASSESSMENT & PLAN NOTE
78-year-old female new self-referral to the office for vascular evaluation.    -Pt reports she has b/l upper thigh swelling that has a dimpled skin appears that only occurs to the lateral thighs.    -Assessment demonstrates palpable 2+ b/l DP and PT pulses. Pt denies claudication, rest pain, new wounds/ tissue loss.  -No visualized bulging varicose veins with sparse spider veins to the medial knee area. -Pt was educated on venous insuffiencey and indications for vascular intervention. No vascular intervention indicated at this time. Discussed and educated that b/l upper thigh swelling and appears of dimpling is not vascular in etiology. Recommending b/l compression, leg elevation for any leg heaviness or tiredness she is experiencing. Pt verbalized understanding and is in agreement with this plan. Recommendations  -Return to the office as needed.  -Start wearing compression. RX given today with education on how to use. -Leg elevation. Goal of 3-4 times a day 15 minutes at a time.  -Increase exercise and ambulation. Goal of 3-4 times a week for 30 min.  -Call the office with any new or worsening symptoms.

## 2023-09-14 NOTE — PROGRESS NOTES
Assessment/Plan:   Leg swelling  79-year-old female new self-referral to the office for vascular evaluation.    -Pt reports she has b/l upper thigh swelling that has a dimpled skin appears that only occurs to the lateral thighs.    -Assessment demonstrates palpable 2+ b/l DP and PT pulses. Pt denies claudication, rest pain, new wounds/ tissue loss.  -No visualized bulging varicose veins with sparse spider veins to the medial knee area. -Pt was educated on venous insuffiencey and indications for vascular intervention. No vascular intervention indicated at this time. Discussed and educated that b/l upper thigh swelling and appears of dimpling is not vascular in etiology. Recommending b/l compression, leg elevation for any leg heaviness or tiredness she is experiencing. Pt verbalized understanding and is in agreement with this plan. Recommendations  -Return to the office as needed.  -Start wearing compression. RX given today with education on how to use. -Leg elevation. Goal of 3-4 times a day 15 minutes at a time.  -Increase exercise and ambulation. Goal of 3-4 times a week for 30 min.  -Call the office with any new or worsening symptoms. Diagnoses and all orders for this visit:    Leg swelling  -     Compression Stocking    Lymphedema  -     Ambulatory Referral to Vascular Surgery          Subjective:      Patient ID: Miguelito Santa is a 56 y.o. female     Patient is new to our practice and was self referred. Pt c/o BLE edema and spider veins for the past 30 years. Pt c/o tiredness and heaviness in BLE. Pt does walk 40 minutes 2x/ day. Pt does not wear compression stockings or elevate her legs. Chief Complaint: Pt states she has b/l leg areas of waves on upper thighs accompanied with occasional tiredness.        HPI    Miguelito Santa is seen for evaluation of: []Varicose veins/legs  [x]Spider veins/legs  []Spider veins/face  []Venous stasis ulcer  []Superficial thrombophlebitis  []Other -      She complains of [x]none  []bulging veins  []dilated veins  []discolored veins         There is []no edema              [x]right leg edema  [x]left leg edema       []bilateral lower extremity edema     There is   [x]no leg pain          []right leg pain  []left leg pain         [x]bilateral leg pain  []bilateral leg pain; L>R   []bilateral leg pain; R>L     Pain is described as []aching              []itching  []sharp                []burning  []throbbing         []stinging  [x]heavy                []dull  []other -      Symptoms have been ongoing for:  unknown   There is  [x]no pertinent medical history  []history of DVT  []PE  []superficial venous thrombosis     Prior treatment includes []none  []EVLT  []OTC stockings  [x]prescription compression stockings  []vein ligation  []vein stripping  []stab phlebectomy  []sclerotherapy injections  []Other -      Current treatment includes []none  [x]compression socks  []avoiding tight clothing  [x]leg elevation  []rest  [x]exercise  []weight management  []skin care  []periodic evaluation   []Other-     Treatment has been [x]effective  []ineffective     Review of Systems   Constitutional: Negative. HENT: Negative. Eyes: Negative. Respiratory: Negative. Negative for shortness of breath. Cardiovascular: Positive for leg swelling. Negative for chest pain. Gastrointestinal: Negative. Endocrine: Negative. Genitourinary: Negative. Musculoskeletal: Positive for arthralgias. Negative for gait problem. BLE pain   Skin: Negative. Allergic/Immunologic: Negative. Neurological: Negative. Hematological: Bruises/bleeds easily. Psychiatric/Behavioral: Negative.            The following portions of the patient's history were reviewed and updated as appropriate: allergies, current medications, past family history, past medical history, past social history, past surgical history and problem list.  Objective     Physical Exam  Vitals and nursing note reviewed. Constitutional:       Appearance: Normal appearance. She is normal weight. HENT:      Head: Normocephalic and atraumatic. Cardiovascular:      Rate and Rhythm: Normal rate and regular rhythm. Pulses: Normal pulses. Radial pulses are 2+ on the right side and 2+ on the left side. Dorsalis pedis pulses are 2+ on the right side and 2+ on the left side. Posterior tibial pulses are 2+ on the right side and 2+ on the left side. Pulmonary:      Effort: Pulmonary effort is normal.      Breath sounds: Normal breath sounds. Musculoskeletal:         General: No tenderness. Right lower leg: No edema. Left lower leg: No edema. Skin:     General: Skin is warm and dry. Capillary Refill: Capillary refill takes less than 2 seconds. Findings: No erythema. Neurological:      General: No focal deficit present. Mental Status: She is alert and oriented to person, place, and time.    Psychiatric:         Mood and Affect: Mood normal.         Behavior: Behavior normal.       Objective:     Vitals:    09/14/23 1457   BP: 122/84   BP Location: Left arm   Patient Position: Sitting   Pulse: 74   Resp: 18   SpO2: 98%   Weight: 91.2 kg (201 lb)   Height: 5' 8" (1.727 m)       Patient Active Problem List   Diagnosis   • Allergic rhinitis   • Atypical glandular cell changes cervix of undetermined significance favor dysplasia   • Major depressive disorder with single episode, in full remission (HCC)   • Deviated nasal septum   • Fibromyalgia   • Herpes labialis   • Hypothyroidism   • Iron deficiency   • Migraine   • Obesity   • Primary osteoarthritis of left knee   • Vitamin B12 deficiency   • Vitamin D deficiency   • Cervical polyp   • Mild intermittent asthma without complication   • Generalized anxiety disorder   • Right knee pain   • B12 deficiency   • Primary osteoarthritis of right knee   • Mild hyperlipidemia   • History of COVID-19   • Lymphedema   • Leg swelling Past Surgical History:   Procedure Laterality Date   • LUMBAR FUSION  2004    Lumbar Vertebral Fusion;     • [facility] HYSTEROSCOPY BX ENDOMETRIUM&/POLYPC W/WO D&C N/A 2022    Procedure: DILATATION AND CURETTAGE (D&C) WITH HYSTEROSCOPY;  Surgeon: Mendel Figueroa MD;  Location: [facility];  Service: Gynecology   • [facility] HYSTEROSCOPY ENDOMETRIAL ABLATION N/A 2022    Procedure: ABLATION ENDOMETRIAL FREDDY;  Surgeon: Mendel Figueroa MD;  Location: [facility];  Service: Gynecology   • [facility] LAPAROSCOPY W/RMVL ADNEXAL STRUCTURES Bilateral 2022    Procedure: SALPINGECTOMY, LAPAROSCOPIC;  Surgeon: Mendel Figueroa MD;  Location: [facility];  Service: Gynecology   • WISDOM TOOTH EXTRACTION         Family History   Problem Relation Age of Onset   • Atrial fibrillation Mother    • Hyperlipidemia Mother    • Hypertension Mother    • Supraventricular tachycardia Mother    • Heart disease Mother    • Diabetes Father            • Hyperlipidemia Father    • Hypertension Father            • Arthritis Maternal Grandmother    • Leukemia Maternal Grandmother    • Cancer Maternal Grandmother         blood ca   • Stroke Maternal Grandfather            • Pancreatic cancer Maternal Grandfather 72   • No Known Problems Sister    • No Known Problems Paternal Grandmother    • No Known Problems Paternal Grandfather    • No Known Problems Sister    • No Known Problems Paternal Aunt    • No Known Problems Paternal Aunt    • No Known Problems Paternal Aunt        Social History     Socioeconomic History   • Marital status:      Spouse name: Not on file   • Number of children: Not on file   • Years of education: Not on file   • Highest education level: Not on file   Occupational History   • Not on file   Tobacco Use   • Smoking status: Never     Passive exposure: Never   • Smokeless tobacco: Never   Vaping Use   • Vaping Use: Never used   Substance and Sexual Activity   • Alcohol use: Not Currently     Comment: occ   • Drug use: No   • Sexual activity: Yes     Partners: Male   Other Topics Concern   • Not on file   Social History Narrative    Caffeine use     Social Determinants of Health     Financial Resource Strain: Not on file   Food Insecurity: Not on file   Transportation Needs: Not on file   Physical Activity: Not on file   Stress: Not on file   Social Connections: Not on file   Intimate Partner Violence: Not on file   Housing Stability: Not on file       Allergies   Allergen Reactions   • Pollen Extract          Current Outpatient Medications:   •  acetaminophen (TYLENOL) 500 mg tablet, Take 2 tablets (1,000 mg total) by mouth every 6 (six) hours as needed for mild pain or moderate pain, Disp: 30 tablet, Rfl: 0  •  albuterol (ProAir HFA) 90 mcg/act inhaler, Inhale 2 puffs every 6 (six) hours as needed for wheezing, Disp: 8.5 g, Rfl: 0  •  ALPRAZolam (XANAX) 0.5 mg tablet, Take 1 tablet (0.5 mg total) by mouth daily as needed for anxiety, Disp: 30 tablet, Rfl: 0  •  Diclofenac Sodium (VOLTAREN) 1 %, Apply 2 g topically 4 (four) times a day (Patient taking differently: Apply 2 g topically 4 (four) times a day as needed), Disp: 100 g, Rfl: 1  •  ergocalciferol (VITAMIN D2) 50,000 units, Take 1 capsule (50,000 Units total) by mouth once a week, Disp: 12 capsule, Rfl: 0  •  ibuprofen (MOTRIN) 600 mg tablet, Take 1 tablet (600 mg total) by mouth every 6 (six) hours as needed for mild pain or moderate pain, Disp: 30 tablet, Rfl: 0  •  levothyroxine 125 mcg tablet, Take 1 tablet (125 mcg total) by mouth daily, Disp: 90 tablet, Rfl: 0  •  ZOLMitriptan (ZOMIG) 5 MG tablet, Take 1 tablet (5 mg total) by mouth once as needed for migraine for up to 1 dose, Disp: 6 tablet, Rfl: 0  •  scopolamine (TRANSDERM-SCOP) 1 mg/3 days TD 72 hr patch, Place 1 patch on the skin every third day (Patient not taking: Reported on 8/10/2023), Disp: 4 patch, Rfl: 0  •  scopolamine (TRANSDERM-SCOP) 1 mg/3 days TD 72 hr patch, Place 1 patch on the skin over 72 hours every third day (Patient not taking: Reported on 8/10/2023), Disp: 4 patch, Rfl: 0  •  valACYclovir (VALTREX) 1,000 mg tablet, Take 1 tablet (1,000 mg total) by mouth 2 (two) times a day for 5 days (Patient not taking: Reported on 8/10/2023), Disp: 10 tablet, Rfl: 5    Current Facility-Administered Medications:   •  cyanocobalamin injection 1,000 mcg, 1,000 mcg, Intramuscular, Q30 Days, Mona Coley DO, 1,000 mcg at 09/06/23 0834    Vitals:    09/14/23 1457   BP: 122/84   Pulse: 74   Resp: 18   SpO2: 98%     I have spent a total time of 30 minutes on 09/14/23 in caring for this patient including Risks and benefits of tx options, Instructions for management, Importance of tx compliance, Risk factor reductions, Impressions, Documenting in the medical record and Obtaining or reviewing history  .

## 2023-09-14 NOTE — PATIENT INSTRUCTIONS
- Call the office if you experience any changes to your legs or feet such as new pain, redness or swelling.    - Stay active. Exercise everyday. Walking is the recommended exercise with a goal of 30 min 3-4 times a week. A healthy weight can assist in decreasing varicose vein symptoms.     - Wear Compression. Put them on in the morning, wear all day and take off before bed at night.     -Elevate your legs above the level of your heart. Elevate for 15 minutes 3-4 times a day. -When looking at buying compression, look for "gradient compression" with a weight 20-30mmHg (medium weight), knee high is fine.  -Try "[employer]ech[employer]"    -A good brand is Sigvaris, soft opaque, knee high.

## 2023-10-05 NOTE — PROGRESS NOTES
Large joint arthrocentesis: R supra patellar bursa  Universal Protocol:  Procedure performed by: (Supervised by Dr. Rachel Hays)  Consent: Verbal consent obtained. Risks and benefits: risks, benefits and alternatives were discussed  Consent given by: patient  Patient understanding: patient states understanding of the procedure being performed  Patient consent: the patient's understanding of the procedure matches consent given  Radiology Images displayed and confirmed. If images not available, report reviewed: imaging studies available  Required items: required blood products, implants, devices, and special equipment available  Patient identity confirmed: verbally with patient    Supporting Documentation  Indications: pain   Procedure Details  Location: knee - R supra patellar bursa  Preparation: Patient was prepped and draped in the usual sterile fashion  Needle size: 18 G  Ultrasound guidance: yes  Medications administered: 3 mL sodium hyaluronate 60 MG/3ML; 1 mL bupivacaine (PF) 0.25 %    Patient tolerance: patient tolerated the procedure well with no immediate complications  Dressing:  Sterile dressing applied    Using iodine, chlorhexadine, and alcohol to sterilize skin. 4CC bupivacaine for local anesthesia. Using sterile US gel for USG injection.

## 2023-10-06 ENCOUNTER — PROCEDURE VISIT (OUTPATIENT)
Dept: OBGYN CLINIC | Facility: OTHER | Age: 48
End: 2023-10-06

## 2023-10-06 VITALS — SYSTOLIC BLOOD PRESSURE: 125 MMHG | DIASTOLIC BLOOD PRESSURE: 87 MMHG | HEART RATE: 102 BPM

## 2023-10-06 DIAGNOSIS — M17.11 PRIMARY OSTEOARTHRITIS OF RIGHT KNEE: Primary | ICD-10-CM

## 2023-10-06 DIAGNOSIS — M25.561 RIGHT KNEE PAIN, UNSPECIFIED CHRONICITY: ICD-10-CM

## 2023-10-06 RX ORDER — BUPIVACAINE HYDROCHLORIDE 2.5 MG/ML
1 INJECTION, SOLUTION EPIDURAL; INFILTRATION; INTRACAUDAL
Status: COMPLETED | OUTPATIENT
Start: 2023-10-06 | End: 2023-10-06

## 2023-10-06 RX ADMIN — BUPIVACAINE HYDROCHLORIDE 1 ML: 2.5 INJECTION, SOLUTION EPIDURAL; INFILTRATION; INTRACAUDAL at 07:30

## 2023-10-09 ENCOUNTER — TELEPHONE (OUTPATIENT)
Dept: OBGYN CLINIC | Facility: CLINIC | Age: 48
End: 2023-10-09

## 2023-10-09 NOTE — TELEPHONE ENCOUNTER
Patient is scheduled for follow up with [facility] on 12/7 but she is having some concerns about some new symptoms she is having - she mentioned mainly vertigo, mood swings, shaking, and not sleeping

## 2023-10-09 NOTE — TELEPHONE ENCOUNTER
Pt is on new thyroid meds - will call pcp regarding her sx. Probably not menopausal. She is keeping appt with Dr. CASTILLO in Dec

## 2023-10-11 ENCOUNTER — CLINICAL SUPPORT (OUTPATIENT)
Dept: FAMILY MEDICINE CLINIC | Facility: CLINIC | Age: 48
End: 2023-10-11
Payer: COMMERCIAL

## 2023-10-11 DIAGNOSIS — E53.8 VITAMIN B12 DEFICIENCY: Primary | ICD-10-CM

## 2023-10-11 PROCEDURE — 96372 THER/PROPH/DIAG INJ SC/IM: CPT

## 2023-10-11 RX ADMIN — CYANOCOBALAMIN 1000 MCG: 1000 INJECTION, SOLUTION INTRAMUSCULAR; SUBCUTANEOUS at 09:13

## 2023-10-11 NOTE — PROGRESS NOTES
Stomatitis   Stomatitis is pain inside the mouth. It can involve open sores (canker sores) or redness and swelling. It occurs on the inside of the cheeks or on the tongue or gums. Stomatitis is more common in children, but it can occur at any age.  Causes  There are many causes of stomatitis, but the most common is viral infections. Other common causes are:  · Injury or irritation of the mouth lining  · Fungal or bacterial infections  · Using tobacco  · Irritating foods or chemicals, such as citrus fruit, toothpaste, or mouthwash  · Lack of certain vitamins, including vitamins B and C  · A weakened immune system  Symptoms  Stomatitis can result in a variety of symptoms, including:  · Redness inside the mouth  · Sores (ulcers) in the mouth  · Pain or burning  · Swelling  · Fever  Treatment  For a viral infection, usually only the symptoms are treated. Antibiotics do not kill viruses and are not recommended for this condition. This infection should go away within 7 to 10 days.  Home care  · Use a local numbing solution for pain relief. Ask the pharmacist for suggestions on which brand and strength is best for your child. You may apply this directly to the sores with a cotton swab or with your finger. Use the numbing solution just before meals if eating is a problem.  · Older children may rinse their mouth with warm saltwater (½ teaspoon of salt in 1 glass of warm water). Be certain they spit the rinse out and do not swallow it.  · Feed your child a soft diet, along with plenty of fluids to prevent dehydration. If your child doesn't want to eat solid foods, it's OK for a few days, as long as he or she drinks lots of fluids. Cool drinks and frozen treats (sherbet) are soothing. Avoid citrus juices (orange juice, lemonade, etc.) and salty or spicy foods, since these may cause more pain in the mouth.  · Follow your healthcare provider's instructions on the use of over-the-counter pain medications such as acetaminophen  Name: Marla Cortez      : 1975      MRN: [id number]  Encounter Provider: Ulysses Crosby  Encounter Date: 10/11/2023   Encounter department: [facility]     1. Vitamin B12 deficiency           Subjective            Current Outpatient Medications on File Prior to Visit   Medication Sig    acetaminophen (TYLENOL) 500 mg tablet Take 2 tablets (1,000 mg total) by mouth every 6 (six) hours as needed for mild pain or moderate pain    albuterol (ProAir HFA) 90 mcg/act inhaler Inhale 2 puffs every 6 (six) hours as needed for wheezing    ALPRAZolam (XANAX) 0.5 mg tablet Take 1 tablet (0.5 mg total) by mouth daily as needed for anxiety    Diclofenac Sodium (VOLTAREN) 1 % Apply 2 g topically 4 (four) times a day (Patient taking differently: Apply 2 g topically 4 (four) times a day as needed)    ergocalciferol (VITAMIN D2) 50,000 units Take 1 capsule (50,000 Units total) by mouth once a week    ibuprofen (MOTRIN) 600 mg tablet Take 1 tablet (600 mg total) by mouth every 6 (six) hours as needed for mild pain or moderate pain    levothyroxine 125 mcg tablet Take 1 tablet (125 mcg total) by mouth daily    scopolamine (TRANSDERM-SCOP) 1 mg/3 days TD 72 hr patch Place 1 patch on the skin every third day (Patient not taking: Reported on 8/10/2023)    scopolamine (TRANSDERM-SCOP) 1 mg/3 days TD 72 hr patch Place 1 patch on the skin over 72 hours every third day (Patient not taking: Reported on 8/10/2023)    valACYclovir (VALTREX) 1,000 mg tablet Take 1 tablet (1,000 mg total) by mouth 2 (two) times a day for 5 days (Patient not taking: Reported on 8/10/2023)    ZOLMitriptan (ZOMIG) 5 MG tablet Take 1 tablet (5 mg total) by mouth once as needed for migraine for up to 1 dose       Objective     There were no vitals taken for this visit.       Ulysses Crosby for fever, fussiness, or pain. In infants older than 6 months, you may use children's ibuprofen. (Note: If your child has chronic liver or kidney disease or has ever had a stomach ulcer or gastrointestinal bleeding, talk with your healthcare provider before using these medicines.) Aspirin should never be given to anyone younger than 18 years of age who is ill with a viral infection or fever. It may cause severe liver or brain damage.  · Children should stay home until their fever is gone and they are eating and drinking well.  Follow-up care  Follow up with your childs healthcare provider, or as advised.  · If a culture was done, you will be notified if the treatment needs to be changed. You can call as directed for the results.  Call 911, or get immediate medical care  Contact emergency services right away if any of these occur:  · Trouble breathing  · Inability to swallow  · Extremes drowsiness or trouble awakening  · Fainting or loss of consciousness  · Rapid heart rate  · Seizure  · Stiff neck  When to seek medical advice  For a usually healthy child, call your child's healthcare provider right away if any of these occur:  · Your child is 3 months old or younger and has a fever of 100.4°F (38°C) or higher. Get medical care right away. Fever in a young baby can be a sign of a dangerous infection.  · Your child is of any age and has repeated fevers above 104°F (40°C).  · Your child is younger than 2 years of age and a fever of 100.4°F (38°C) continues for more than 1 day.  · Your child is 2 years old or older and a fever of 100.4°F (38°C) continues for more than 3 days.  · Your child is unable to eat or drink due to mouth pain.  · Your child shows unusual fussiness, drowsiness or confusion  · Your child shows symptoms of dehydration, including no wet diapers for 8 hours, no tears when crying, sunken eyes, or a dry mouth  Date Last Reviewed: 7/30/2015  © 1254-7397 [employer]. [address] [place] [address],  CORNEL Castellanos [address]. All rights reserved. This information is not intended as a substitute for professional medical care. Always follow your healthcare professional's instructions.

## 2023-10-13 DIAGNOSIS — N95.1 MENOPAUSAL SYMPTOMS: Primary | ICD-10-CM

## 2023-10-17 ENCOUNTER — TELEPHONE (OUTPATIENT)
Age: 48
End: 2023-10-17

## 2023-10-17 NOTE — TELEPHONE ENCOUNTER
Caller: Patient     Doctor: Jose Daniel Martínez     Reason for call: Patient is having a lot of knee pain and would like some direction, patient had a USGI on 10/6/23     Call back#: [phone]

## 2023-10-19 ENCOUNTER — APPOINTMENT (OUTPATIENT)
Dept: LAB | Facility: AMBULARY SURGERY CENTER | Age: 48
End: 2023-10-19
Payer: COMMERCIAL

## 2023-10-19 DIAGNOSIS — E78.5 MILD HYPERLIPIDEMIA: ICD-10-CM

## 2023-10-19 DIAGNOSIS — E03.9 ACQUIRED HYPOTHYROIDISM: ICD-10-CM

## 2023-10-19 DIAGNOSIS — N95.1 MENOPAUSAL SYMPTOMS: ICD-10-CM

## 2023-10-19 LAB
ALBUMIN SERPL BCP-MCNC: 3.9 G/DL (ref 3.5–5)
ALP SERPL-CCNC: 88 U/L (ref 34–104)
ALT SERPL W P-5'-P-CCNC: 14 U/L (ref 7–52)
ANION GAP SERPL CALCULATED.3IONS-SCNC: 8 MMOL/L
AST SERPL W P-5'-P-CCNC: 18 U/L (ref 13–39)
BILIRUB SERPL-MCNC: 0.93 MG/DL (ref 0.2–1)
BUN SERPL-MCNC: 9 MG/DL (ref 5–25)
CALCIUM SERPL-MCNC: 9.2 MG/DL (ref 8.4–10.2)
CHLORIDE SERPL-SCNC: 103 MMOL/L (ref 96–108)
CHOLEST SERPL-MCNC: 230 MG/DL
CO2 SERPL-SCNC: 27 MMOL/L (ref 21–32)
CREAT SERPL-MCNC: 0.61 MG/DL (ref 0.6–1.3)
ESTRADIOL SERPL-MCNC: 290.8 PG/ML
FSH SERPL-ACNC: 6.1 MIU/ML
GFR SERPL CREATININE-BSD FRML MDRD: 109 ML/MIN/1.73SQ M
GLUCOSE P FAST SERPL-MCNC: 86 MG/DL (ref 65–99)
HDLC SERPL-MCNC: 50 MG/DL
LDLC SERPL CALC-MCNC: 131 MG/DL (ref 0–100)
POTASSIUM SERPL-SCNC: 4 MMOL/L (ref 3.5–5.3)
PROGEST SERPL-MCNC: 0.29 NG/ML
PROT SERPL-MCNC: 7.2 G/DL (ref 6.4–8.4)
SODIUM SERPL-SCNC: 138 MMOL/L (ref 135–147)
TRIGL SERPL-MCNC: 243 MG/DL
TSH SERPL DL<=0.05 MIU/L-ACNC: 2.12 UIU/ML (ref 0.45–4.5)

## 2023-10-19 PROCEDURE — 80061 LIPID PANEL: CPT

## 2023-10-19 PROCEDURE — 83001 ASSAY OF GONADOTROPIN (FSH): CPT

## 2023-10-19 PROCEDURE — 36415 COLL VENOUS BLD VENIPUNCTURE: CPT

## 2023-10-19 PROCEDURE — 80053 COMPREHEN METABOLIC PANEL: CPT

## 2023-10-19 PROCEDURE — 84443 ASSAY THYROID STIM HORMONE: CPT

## 2023-10-19 PROCEDURE — 84144 ASSAY OF PROGESTERONE: CPT

## 2023-10-19 PROCEDURE — 82670 ASSAY OF TOTAL ESTRADIOL: CPT

## 2023-10-20 NOTE — TELEPHONE ENCOUNTER
Caller: Patient     Doctor: Sivan Majano     Reason for call: Patient is having a lot of pain in the right knee. She called 3 days ago and still has not gotten a response. She is asking if there is anything she can do for the pain. She is using ice, elevating and using Aleve which is not helping much. Please advise.      Call back#: [phone]

## 2023-10-23 ENCOUNTER — OFFICE VISIT (OUTPATIENT)
Dept: OBGYN CLINIC | Facility: OTHER | Age: 48
End: 2023-10-23
Payer: COMMERCIAL

## 2023-10-23 VITALS
HEART RATE: 84 BPM | BODY MASS INDEX: 30.47 KG/M2 | SYSTOLIC BLOOD PRESSURE: 120 MMHG | DIASTOLIC BLOOD PRESSURE: 84 MMHG | HEIGHT: 68 IN | WEIGHT: 201.06 LBS

## 2023-10-23 DIAGNOSIS — M17.11 PRIMARY OSTEOARTHRITIS OF RIGHT KNEE: Primary | ICD-10-CM

## 2023-10-23 DIAGNOSIS — M25.561 RIGHT KNEE PAIN, UNSPECIFIED CHRONICITY: ICD-10-CM

## 2023-10-23 PROCEDURE — 99213 OFFICE O/P EST LOW 20 MIN: CPT | Performed by: ORTHOPAEDIC SURGERY

## 2023-10-23 RX ORDER — EMULSION BASE NO.258
1 EMULSION (GRAM) TOPICAL 3 TIMES DAILY PRN
Qty: 51 G | Refills: 2 | Status: SHIPPED | OUTPATIENT
Start: 2023-10-23

## 2023-10-23 NOTE — TELEPHONE ENCOUNTER
Called and spoke w/pt and she states that she has appt w/Dr Verona Gallego today. Apologized that no one had gotten back to her before now. She states the pain was pretty bad but she appreciated getting the appt.

## 2023-10-23 NOTE — PROGRESS NOTES
Assessment:     Diagnosis ICD-10-CM Associated Orders   1. Primary osteoarthritis of right knee  M17.11       2. Right knee pain, unspecified chronicity  M25.561            Plan:    We discussed clinical examination and findings with Ellie Cross  Reviewed imaging as outlined below. At this time, clinical presentation and findings are consistent with osteoarthritis of the right knee and IT band syndrome. We reviewed anatomical and biomechanics of affected area. Today, we discussed the non-operative treatment options that include, but are not limited to: rest, ice, activity modification, and anti-inflammatory medication Today, patient was given a prescription for gabapentin/naproxen ointment tid prn. We also recommended continue physical therapy  for IT band syndrome. Also discussed consideration of PRP injection, genicular nerve block and surgical intervention if visco injection does not provide pain relief after 6 weeks. She may also try tens machine and knee sleeve OTC for the right knee pain. However she agreed she is two weeks after visco injection and would give more time for this. If all of the above fails, would refer her to surgery. Shared decision making, patient agreeable to plan. There are no Patient Instructions on file for this visit. Follow-Up:    3 months    Chief Complaint   Patient presents with    Right Knee - Follow-up         HPI:    Pascual Parkinson is a 43-year-old female patient presented for follow-up of right knee pain. She had ultrasound-guided Visco injection on 10/6/2023. However, she reported her pain is getting worse. No fever, chills, erythema, or warmth of the knee. I have personally reviewed pertinent films in PACS.     Patient Active Problem List   Diagnosis    Allergic rhinitis    Atypical glandular cell changes cervix of undetermined significance favor dysplasia    Major depressive disorder with single episode, in full remission ([address])    Deviated nasal septum    Fibromyalgia    Herpes labialis    Hypothyroidism    Iron deficiency    Migraine    Obesity    Primary osteoarthritis of left knee    Vitamin B12 deficiency    Vitamin D deficiency    Cervical polyp    Mild intermittent asthma without complication    Generalized anxiety disorder    Right knee pain    B12 deficiency    Primary osteoarthritis of right knee    Mild hyperlipidemia    History of COVID-19    Lymphedema    Leg swelling        Current Outpatient Medications on File Prior to Visit   Medication Sig Dispense Refill    acetaminophen (TYLENOL) 500 mg tablet Take 2 tablets (1,000 mg total) by mouth every 6 (six) hours as needed for mild pain or moderate pain 30 tablet 0    albuterol (ProAir HFA) 90 mcg/act inhaler Inhale 2 puffs every 6 (six) hours as needed for wheezing 8.5 g 0    ALPRAZolam (XANAX) 0.5 mg tablet Take 1 tablet (0.5 mg total) by mouth daily as needed for anxiety 30 tablet 0    Diclofenac Sodium (VOLTAREN) 1 % Apply 2 g topically 4 (four) times a day (Patient taking differently: Apply 2 g topically 4 (four) times a day as needed) 100 g 1    ergocalciferol (VITAMIN D2) 50,000 units Take 1 capsule (50,000 Units total) by mouth once a week 12 capsule 0    ibuprofen (MOTRIN) 600 mg tablet Take 1 tablet (600 mg total) by mouth every 6 (six) hours as needed for mild pain or moderate pain 30 tablet 0    levothyroxine 125 mcg tablet Take 1 tablet (125 mcg total) by mouth daily 90 tablet 0    ZOLMitriptan (ZOMIG) 5 MG tablet Take 1 tablet (5 mg total) by mouth once as needed for migraine for up to 1 dose 6 tablet 0    scopolamine (TRANSDERM-SCOP) 1 mg/3 days TD 72 hr patch Place 1 patch on the skin every third day (Patient not taking: Reported on 8/10/2023) 4 patch 0    scopolamine (TRANSDERM-SCOP) 1 mg/3 days TD 72 hr patch Place 1 patch on the skin over 72 hours every third day (Patient not taking: Reported on 8/10/2023) 4 patch 0    valACYclovir (VALTREX) 1,000 mg tablet Take 1 tablet (1,000 mg total) by mouth 2 (two) times a day for 5 days (Patient not taking: Reported on 8/10/2023) 10 tablet 5     Current Facility-Administered Medications on File Prior to Visit   Medication Dose Route Frequency Provider Last Rate Last Admin    cyanocobalamin injection 1,000 mcg  1,000 mcg Intramuscular Q30 Days Sabrina Hammond DO   1,000 mcg at 10/11/23 0913        Allergies   Allergen Reactions    Pollen Extract         Tobacco Use: Low Risk  (10/23/2023)    Patient History     Smoking Tobacco Use: Never     Smokeless Tobacco Use: Never     Passive Exposure: Never        Social Determinants of Health     Tobacco Use: Low Risk  (10/23/2023)    Patient History     Smoking Tobacco Use: Never     Smokeless Tobacco Use: Never     Passive Exposure: Never   Alcohol Use: Not At Risk (5/20/2021)    AUDIT-C     Frequency of Alcohol Consumption: Monthly or less     Average Number of Drinks: 1 or 2     Frequency of Binge Drinking: Never   Financial Resource Strain: Not on file   Food Insecurity: Not on file   Transportation Needs: Not on file   Physical Activity: Not on file   Stress: Not on file   Social Connections: Not on file   Intimate Partner Violence: Not on file   Depression: Not at risk (1/13/2020)    PHQ-2     PHQ-2 Score: 2   Housing Stability: Not on file   Utilities: Not on file        Review of Systems   Constitutional: Negative for chills and fever. HENT: Negative for ear pain and sore throat. Eyes: Negative for pain and visual disturbance. Respiratory: Negative for cough and shortness of breath. Cardiovascular: Negative for chest pain and palpitations. Gastrointestinal: Negative for abdominal pain and vomiting. Genitourinary: Negative for dysuria and hematuria. Skin: Negative for color change and rash. Neurological: Negative for seizures and syncope. All other systems reviewed and are negative.      Physical Exam  /84 (BP Location: Left arm, Patient Position: Sitting, Cuff Size: Standard) Pulse 84   Ht 5' 8" (1.727 m)   Wt 91.2 kg (201 lb 1 oz)   BMI 30.57 kg/m²     Constitutional:  see vital signs  Gen: well-developed, normocephalic/atraumatic, well-groomed  Eyes: No inflammation or discharge of conjunctiva or lids; sclera clear   Pharynx: no inflammation, lesion, or mass of lips  Neck: supple, no masses, non-distended  MSK: no inflammation, lesion, mass, or clubbing of nails and digits except for other than mentioned below  SKIN: no visible rashes or skin lesions  Pulmonary/Chest: Effort normal. No respiratory distress. NEURO: cranial nerves grossly intact  PSYCH:  Alert and oriented to person, place, and time; recent and remote memory intact; mood normal, no depression, anxiety, or agitation, judgment and insight good and intact     Ortho Exam    RIGHT KNEE:  Erythema: no  Swelling: no  Increased Warmth: no  Tenderness:lateral joint line  Flexion: seated at 90  Extension: intact  Drawer: negative  Varus laxity: negative  Valgus laxity: negative     Procedures             Portions of the record may have been created with voice recognition software. Occasional wrong word or "sound alike" substitutions may have occurred due to the inherent limitations of voice recognition software. Please review the chart carefully and recognize, using context, where substitutions/typographical errors may have occurred.

## 2023-11-06 ENCOUNTER — EVALUATION (OUTPATIENT)
Dept: PHYSICAL THERAPY | Facility: CLINIC | Age: 48
End: 2023-11-06
Payer: COMMERCIAL

## 2023-11-06 ENCOUNTER — HOSPITAL ENCOUNTER (OUTPATIENT)
Dept: RADIOLOGY | Age: 48
Discharge: HOME | End: 2023-11-06
Payer: COMMERCIAL

## 2023-11-06 DIAGNOSIS — H81.12 BPPV (BENIGN PAROXYSMAL POSITIONAL VERTIGO), LEFT: ICD-10-CM

## 2023-11-06 DIAGNOSIS — E07.89 THYROID FULLNESS: ICD-10-CM

## 2023-11-06 DIAGNOSIS — E03.9 HYPOTHYROIDISM, UNSPECIFIED TYPE: ICD-10-CM

## 2023-11-06 PROCEDURE — 76536 US EXAM OF HEAD AND NECK: CPT

## 2023-11-06 PROCEDURE — 97112 NEUROMUSCULAR REEDUCATION: CPT | Performed by: PHYSICAL THERAPIST

## 2023-11-06 PROCEDURE — 97110 THERAPEUTIC EXERCISES: CPT | Performed by: PHYSICAL THERAPIST

## 2023-11-06 RX ORDER — LEVOTHYROXINE SODIUM 0.12 MG/1
125 TABLET ORAL DAILY
Qty: 90 TABLET | Refills: 1 | Status: SHIPPED | OUTPATIENT
Start: 2023-11-06

## 2023-11-06 NOTE — PROGRESS NOTES
PT Evaluation     Today's date: 2023  Patient name: Norma Gill  : 1975  MRN: [id number]  Referring provider: Emanuel Kawasaki, PA-C  Dx:   Encounter Diagnosis     ICD-10-CM    1. BPPV (benign paroxysmal positional vertigo), left  H81.12 Ambulatory referral to Physical Therapy                     Assessment  Assessment details: Assessment:   Patient presents to outpatient physical therapy with sx consistent with vestibular hypofunction. Positional testing negative for BPPV. Further testing suggest VOR insufficiency and left vestibular hypofunction contributing to patients symptoms of dizziness and decreased gaze stability with quick movements. These impairments are currently limiting the patient's ability to work efficiently and carry out daily tasks without the risk of dizziness and imbalance. Also with complaints of CS pain and demonstrates hypomobility and STR t/o. Patient was provided with gaze stability and cervical stretches for HEP. Patient will be away on vacation next week, will be home and then abroad again for another week. Patient will contact clinic if she needs further assistance with dizziness or progression of exercises. Pt would benefit from skilled outpatient physical therapy to address the above impairments in order to improve patient's QOL,  reduce symptoms of dizziness and maximize function. Pt would benefit from skilled outpatient physical therapy to address the above impairments in order to improve patient's QOL,  reduce symptoms of dizziness and maximize function. Patient educated that symptoms of vertigo may be exacerbated over the next 24-48 hours and that this can be normal after PT evaluation and treatment. Patient advised to perform regular daily routine as able and to avoid excessive head movements and positions changes and these can illicit symptoms. Patient verbalized understanding.          Impairments: lacks appropriate home exercise program  Other impairment: VOr insufficiency    Symptom irritability: lowUnderstanding of Dx/Px/POC: good   Prognosis: good    Goals  Patient not returning to therapy at this time. Plan  Plan details: Will leave chart open for 30 days in case patient needs to return to therapy after her trips. Should PT not hear from patient within 30 days this case will be discharged. Patient would benefit from: skilled physical therapy  Planned therapy interventions: patient education, neuromuscular re-education, home exercise program and work reintegration  Frequency: 1x week  Duration in weeks: 4  Plan of Care beginning date: 2023  Plan of Care expiration date: 2023  Treatment plan discussed with: patient    Subjective Evaluation    History of Present Illness  Mechanism of injury: Started 1 month room spinning dizziness that lasted 4-5 days, then more "sea sickness feeling" let it go. Last week work up in the middle of the night and the room was spinning so bad, and was clutching her pillow. Was worse this time then last time. Wasn't at night last time. The spinning was much more intense and bad after affects. Did see ENT - tried to realign crystals - still had some bouts of spinning. Does feel better but not 100% still feels off. Leaves for [facility] on 2023 and is back for a week then leaves for [place]. Worried about going away. Was prescribed Zofran because she was vomiting during the second bout. Is a side sleeper tosses and turns. Lying on left side is much worse . Has been getting pain on left side of neck - shooting pain ENT suggested could be cervicogenic. Works in physician recruitment, works from home. The days it was bad couldn't look at computer. Pain  At best pain ratin  At worst pain ratin  Location: Left sided CS pain  Quality: sharp  Exacerbated by: sleeping with tossing and turning. Exercise history: used to walk 2x/week =  but also has a knee issue now . . rec'd injection knee is feeling better      Diagnostic Tests  No diagnostic tests performed  Treatments  Previous treatment: medication (ENT)      Objective  PT/OT Neuro Exam  Neurologic Exam  H  Past Medical History:   Diagnosis Date    Arthritis     Asthma     Breast lump     resolved [id number]    Depression     Disease of thyroid gland     Hair loss     Hemorrhage of conjunctiva, left     last assessed [id number]    Knee joint pain     right    Lyme disease     lastt assessed [id number]    Migraine     Thyroid disease      Is patient taking medication for this issue? No Instructed to take meclizine just as needed. Took one pill and it did help, still didn't feel 100% but could function      PHYSICAL FINDINGS:  Cervical Spine Examination:    Resting posture:      Normal    Cervical spine active range of motion:   within normal limits some dizziness with looking down       Sharp shelly:      Normal  Alar ligament stability test    Normal  MVBI      Right: Asymptomatic Left:Asymptomatic  Cervical Palpation:     Hypo mobile throughout c-spine w b/L STR w/TTP    Oculomotor ROM :  Resting nystagmus: No  Gaze holding nystagmus Yes To the left   Smooth pursuit Normal    Vertical Saccades:Normal  Horizontal Saccades:Normal      Head thrust (room light):  Abnormal when head turned fast to the left  VORx1: Normal  VOR cancel: normal     Dynamic Visual Acuity TBA PRN  Static Head: 20/  Dynamic Head: 20/        MCTSIB  30s eyes open firm surface   30s eyes closed form surface  30s eyes open foam surface  25s, 30s eyes closed foam surface    FGA: NT       DHI:   0-30 mild , 30-60 moderate,  severe disability      Positional testing: Right Left   [facility] (-) (-)   Roll test: (-) (-)               Beatrice Stewart, PT  11/6/2023             Short Term Goal Expiration Date:(4 weeks)  Long Term Goal Expiration Date: (4 weeks)  POC Expiration Date: (4 weeks)             Precautions (-)       Manuals 11/06 Neuro Re-Ed         Vor x 1  Seated plain  H 30"  V 30"                                                       Ther Ex        Cervical stretches  UT: 30" ea  LS 30"ea                                                                  Ther Activity                        Gait Training                        Modalities

## 2023-11-10 PROCEDURE — 97162 PT EVAL MOD COMPLEX 30 MIN: CPT | Performed by: PHYSICAL THERAPIST

## 2023-12-07 ENCOUNTER — CLINICAL SUPPORT (OUTPATIENT)
Dept: FAMILY MEDICINE CLINIC | Facility: CLINIC | Age: 48
End: 2023-12-07
Payer: COMMERCIAL

## 2023-12-07 ENCOUNTER — TELEMEDICINE (OUTPATIENT)
Dept: OBGYN CLINIC | Facility: CLINIC | Age: 48
End: 2023-12-07
Payer: COMMERCIAL

## 2023-12-07 DIAGNOSIS — N95.1 PERIMENOPAUSAL SYMPTOMS: Primary | ICD-10-CM

## 2023-12-07 DIAGNOSIS — Z23 ENCOUNTER FOR IMMUNIZATION: Primary | ICD-10-CM

## 2023-12-07 DIAGNOSIS — E06.3 HYPOTHYROIDISM DUE TO HASHIMOTO'S THYROIDITIS: ICD-10-CM

## 2023-12-07 DIAGNOSIS — E03.8 HYPOTHYROIDISM DUE TO HASHIMOTO'S THYROIDITIS: ICD-10-CM

## 2023-12-07 PROBLEM — Z86.16 HISTORY OF COVID-19: Status: RESOLVED | Noted: 2022-07-26 | Resolved: 2023-12-07

## 2023-12-07 PROCEDURE — 90686 IIV4 VACC NO PRSV 0.5 ML IM: CPT

## 2023-12-07 PROCEDURE — 90471 IMMUNIZATION ADMIN: CPT

## 2023-12-07 PROCEDURE — 99215 OFFICE O/P EST HI 40 MIN: CPT | Performed by: OBSTETRICS & GYNECOLOGY

## 2023-12-07 RX ORDER — PROGESTERONE 100 MG/1
100 CAPSULE ORAL
Qty: 30 CAPSULE | Refills: 11 | Status: SHIPPED | OUTPATIENT
Start: 2023-12-07

## 2023-12-07 RX ORDER — LIOTHYRONINE SODIUM 5 UG/1
5 TABLET ORAL DAILY
Qty: 30 TABLET | Refills: 11 | Status: SHIPPED | OUTPATIENT
Start: 2023-12-07

## 2023-12-08 NOTE — PROGRESS NOTES
Virtual Regular Visit    Verification of patient location:    Patient is located at Home in the following state in which I hold an active license [place]      Assessment/Plan:    Problem List Items Addressed This Visit          Endocrine    Hypothyroidism    Relevant Medications    liothyronine (CYTOMEL) 5 mcg tablet     Other Visit Diagnoses       Perimenopausal symptoms    -  Primary    Relevant Medications    Progesterone 100 MG CAPS          1)Labs from 3/23 reviewed, noting:a) Ovarian axis, very perimenopausal with robust estradiol level; b) thyroid axis normal and well managed although FT3 borderline at 2.57 ( 2.5-4.0 normal); c) adrenal: DHEA low normal 81, as is testosterone at 20.  2) I recommend:  A) Thyroid: either a thyroid support complex to help convert T4 to T3, or a touch of cytomel to see how she feels. Both were sent in, thyroid support through [employer]. She should know pretty quickly how the cytomel works, watch for tachycardia. B) Adrenal: offered DHEA 10 mg through [employer], which is testosterone precursor. C) Progesterone, either cyclic or continuous use. Side effect somnolence, so take at night. 3) She will still need to monitor diet, may do after holidays, with low carb approach. Keto food plans are excellent for brain fog and weight loss. 4) May hold off on salivary cortisol testing for now. May come back to this if not feeling better. I still think there is a degree of cortisol activation as a cause of weight gain. Offered adrenal adaptagens Stress Balance also from [employer] if desired to try. Pillars of adrenal healing reviewed: sleep, nutrition, exercise, mental health. 5) Follow up prn. This was a 50 minute visit with greater than 50% of time spent in face to face counseling and coordination of care.        Reason for visit is follow up  Chief Complaint   Patient presents with    Virtual Regular Visit          Encounter provider Minesh Kerr MD    Provider located at [address] [place] [address]  [phone]      Recent Visits  Date Type Provider Dept   12/07/23 Telemedicine Geovanni Sauceda MD [facility]   Showing recent visits within past 7 days and meeting all other requirements  Future Appointments  No visits were found meeting these conditions. Showing future appointments within next 150 days and meeting all other requirements       The patient was identified by name and date of birth. Lucia Whitney was informed that this is a telemedicine visit and that the visit is being conducted through the [facility]. She agrees to proceed. .  My office door was closed. No one else was in the room. She acknowledged consent and understanding of privacy and security of the video platform. The patient has agreed to participate and understands they can discontinue the visit at any time. Patient is aware this is a billable service. Octavio Argueta returns for hormonal consult follow up. I saw her 9 months ago with complaints of brain fog, weight gain, hair loss , sleep disturbance. S/p BTL with endo ablation so menopausal status uncertain. HX of Hashimotos hypothyroidism. I had ordered ovarian/ thyroid/ adrenal axis testing with 24 hr salivary cortisol test. She never completed the saliva tests. No change in health status, symptoms not improved. S/p recent vertigo. Recent thyroid US normal.   I had asked for a food log to review last time, not yet submitted.         Past Medical History:   Diagnosis Date    Arthritis     Asthma     Breast lump     resolved [id number]    Depression     Disease of thyroid gland     Hair loss     Hemorrhage of conjunctiva, left     last assessed [id number]    Knee joint pain     right    Lyme disease     lastt assessed [id number]    Migraine     Thyroid disease        Past Surgical History: Procedure Laterality Date    LUMBAR FUSION  01/01/2004    Lumbar Vertebral Fusion;  2004    SD HYSTEROSCOPY BX ENDOMETRIUM&/POLYPC W/WO D&C N/A 2/16/2022    Procedure: DILATATION AND CURETTAGE (D&C) WITH HYSTEROSCOPY;  Surgeon: Donita Conroy MD;  Location: [facility];  Service: Gynecology    SD HYSTEROSCOPY ENDOMETRIAL ABLATION N/A 2/16/2022    Procedure: Theodoro Coral;  Surgeon: Donita Conroy MD;  Location: [facility];  Service: Gynecology    SD LAPAROSCOPY W/RMVL ADNEXAL STRUCTURES Bilateral 2/16/2022    Procedure: SALPINGECTOMY, LAPAROSCOPIC;  Surgeon: Donita Conroy MD;  Location: [facility];  Service: Gynecology    WISDOM TOOTH EXTRACTION         Current Outpatient Medications   Medication Sig Dispense Refill    liothyronine (CYTOMEL) 5 mcg tablet Take 1 tablet (5 mcg total) by mouth daily 30 tablet 11    Progesterone 100 MG CAPS Take 100 mg by mouth at bedtime 30 capsule 11    acetaminophen (TYLENOL) 500 mg tablet Take 2 tablets (1,000 mg total) by mouth every 6 (six) hours as needed for mild pain or moderate pain 30 tablet 0    albuterol (ProAir HFA) 90 mcg/act inhaler Inhale 2 puffs every 6 (six) hours as needed for wheezing 8.5 g 0    ALPRAZolam (XANAX) 0.5 mg tablet Take 1 tablet (0.5 mg total) by mouth daily as needed for anxiety 30 tablet 0    Diclofenac Sodium (VOLTAREN) 1 % Apply 2 g topically 4 (four) times a day (Patient taking differently: Apply 2 g topically 4 (four) times a day as needed) 100 g 1    ergocalciferol (VITAMIN D2) 50,000 units Take 1 capsule (50,000 Units total) by mouth once a week 12 capsule 0    ibuprofen (MOTRIN) 600 mg tablet Take 1 tablet (600 mg total) by mouth every 6 (six) hours as needed for mild pain or moderate pain 30 tablet 0    levothyroxine 125 mcg tablet Take 1 tablet (125 mcg total) by mouth daily 90 tablet 1    meclizine (ANTIVERT) 12.5 MG tablet Take 1 tablet (12.5 mg total) by mouth 3 (three) times a day as needed for dizziness 30 tablet 0    Naproxen Sodium-Gabapentin (Gabapentin-Naproxen Cmpd Kit) 5-10 % CREA Apply 1 Application topically 3 (three) times a day as needed (right knee pain) 51 g 2    valACYclovir (VALTREX) 1,000 mg tablet Take 1 tablet (1,000 mg total) by mouth 2 (two) times a day for 5 days (Patient not taking: Reported on 8/10/2023) 10 tablet 5    ZOLMitriptan (ZOMIG) 5 MG tablet Take 1 tablet (5 mg total) by mouth once as needed for migraine for up to 1 dose 6 tablet 0     Current Facility-Administered Medications   Medication Dose Route Frequency Provider Last Rate Last Admin    cyanocobalamin injection 1,000 mcg  1,000 mcg Intramuscular Q30 Days Nabeel Petties, DO   1,000 mcg at 10/11/23 0913        Allergies   Allergen Reactions    Pollen Extract        Review of Systems   Constitutional:  Positive for fatigue and unexpected weight change. Gastrointestinal: Negative. Endocrine: Positive for heat intolerance. Genitourinary: Negative. Musculoskeletal: Negative. Skin:         Hair loss   Psychiatric/Behavioral:  Positive for dysphoric mood and sleep disturbance. Video Exam    There were no vitals filed for this visit.     Physical Exam

## 2023-12-14 ENCOUNTER — CLINICAL SUPPORT (OUTPATIENT)
Dept: FAMILY MEDICINE CLINIC | Facility: CLINIC | Age: 48
End: 2023-12-14
Payer: COMMERCIAL

## 2023-12-14 DIAGNOSIS — E53.8 VITAMIN B12 DEFICIENCY: Primary | ICD-10-CM

## 2023-12-14 PROCEDURE — 96372 THER/PROPH/DIAG INJ SC/IM: CPT

## 2023-12-14 RX ADMIN — CYANOCOBALAMIN 1000 MCG: 1000 INJECTION, SOLUTION INTRAMUSCULAR; SUBCUTANEOUS at 14:48

## 2023-12-14 NOTE — PROGRESS NOTES
Name: Dillan Fink      : 1975      MRN: [id number]  Encounter Provider: Rajeev Story  Encounter Date: 2023   Encounter department: [facility]     1. Vitamin B12 deficiency           Subjective      HPI  Review of Systems    Current Outpatient Medications on File Prior to Visit   Medication Sig    acetaminophen (TYLENOL) 500 mg tablet Take 2 tablets (1,000 mg total) by mouth every 6 (six) hours as needed for mild pain or moderate pain    albuterol (ProAir HFA) 90 mcg/act inhaler Inhale 2 puffs every 6 (six) hours as needed for wheezing    ALPRAZolam (XANAX) 0.5 mg tablet Take 1 tablet (0.5 mg total) by mouth daily as needed for anxiety    Diclofenac Sodium (VOLTAREN) 1 % Apply 2 g topically 4 (four) times a day (Patient taking differently: Apply 2 g topically 4 (four) times a day as needed)    ergocalciferol (VITAMIN D2) 50,000 units Take 1 capsule (50,000 Units total) by mouth once a week    ibuprofen (MOTRIN) 600 mg tablet Take 1 tablet (600 mg total) by mouth every 6 (six) hours as needed for mild pain or moderate pain    levothyroxine 125 mcg tablet Take 1 tablet (125 mcg total) by mouth daily    liothyronine (CYTOMEL) 5 mcg tablet Take 1 tablet (5 mcg total) by mouth daily    meclizine (ANTIVERT) 12.5 MG tablet Take 1 tablet (12.5 mg total) by mouth 3 (three) times a day as needed for dizziness    Naproxen Sodium-Gabapentin (Gabapentin-Naproxen Cmpd Kit) 5-10 % CREA Apply 1 Application topically 3 (three) times a day as needed (right knee pain)    Progesterone 100 MG CAPS Take 100 mg by mouth at bedtime    valACYclovir (VALTREX) 1,000 mg tablet Take 1 tablet (1,000 mg total) by mouth 2 (two) times a day for 5 days (Patient not taking: Reported on 8/10/2023)    ZOLMitriptan (ZOMIG) 5 MG tablet Take 1 tablet (5 mg total) by mouth once as needed for migraine for up to 1 dose       Objective     There were no vitals taken for this visit.       Jessica Downing Netta

## 2024-01-23 ENCOUNTER — OFFICE VISIT (OUTPATIENT)
Dept: OBGYN CLINIC | Facility: OTHER | Age: 49
End: 2024-01-23
Payer: COMMERCIAL

## 2024-01-23 VITALS — SYSTOLIC BLOOD PRESSURE: 132 MMHG | HEART RATE: 106 BPM | DIASTOLIC BLOOD PRESSURE: 83 MMHG

## 2024-01-23 DIAGNOSIS — M17.11 PRIMARY OSTEOARTHRITIS OF RIGHT KNEE: Primary | ICD-10-CM

## 2024-01-23 PROCEDURE — 99213 OFFICE O/P EST LOW 20 MIN: CPT | Performed by: ORTHOPAEDIC SURGERY

## 2024-01-23 NOTE — PROGRESS NOTES
Chief Complaint: Right knee pain    HPI:    Ellie Cross is a 48 year old Female who presents today for follow-up of right knee pain      Description of symptoms: Patient has a known history of right knee osteoarthritis.  Last seen on 10/23/2023.  She last received an ultrasound-guided right knee viscosupplementation injection on 10/6/2023 after which she had brief flareup of right knee pain.    Today, the patient reports that subsequent to the last office visit she had very good relief of the right knee pain and was able to do extensive walking during an international trip.  She has noticed some mild recurrence of pain over the last 2 weeks but this is gradually improving as well.  No new injury reported.  Denies any associated locking episodes.    I have personally reviewed pertinent films in PACS.    Patient Active Problem List   Diagnosis    Allergic rhinitis    Atypical glandular cell changes cervix of undetermined significance favor dysplasia    Major depressive disorder with single episode, in full remission (HCC)    Deviated nasal septum    Fibromyalgia    Herpes labialis    Hypothyroidism    Iron deficiency    Migraine    Obesity    Primary osteoarthritis of left knee    Vitamin B12 deficiency    Vitamin D deficiency    Cervical polyp    Mild intermittent asthma without complication    Generalized anxiety disorder    Right knee pain    B12 deficiency    Primary osteoarthritis of right knee    Mild hyperlipidemia    Lymphedema    Leg swelling        Current Outpatient Medications on File Prior to Visit   Medication Sig Dispense Refill    acetaminophen (TYLENOL) 500 mg tablet Take 2 tablets (1,000 mg total) by mouth every 6 (six) hours as needed for mild pain or moderate pain 30 tablet 0    albuterol (ProAir HFA) 90 mcg/act inhaler Inhale 2 puffs every 6 (six) hours as needed for wheezing 8.5 g 0    ALPRAZolam (XANAX) 0.5 mg tablet Take 1 tablet (0.5 mg total) by mouth daily as needed for anxiety 30 tablet  0    Diclofenac Sodium (VOLTAREN) 1 % Apply 2 g topically 4 (four) times a day (Patient taking differently: Apply 2 g topically 4 (four) times a day as needed) 100 g 1    ergocalciferol (VITAMIN D2) 50,000 units Take 1 capsule (50,000 Units total) by mouth once a week 12 capsule 0    ibuprofen (MOTRIN) 600 mg tablet Take 1 tablet (600 mg total) by mouth every 6 (six) hours as needed for mild pain or moderate pain 30 tablet 0    levothyroxine 125 mcg tablet Take 1 tablet (125 mcg total) by mouth daily 90 tablet 1    liothyronine (CYTOMEL) 5 mcg tablet Take 1 tablet (5 mcg total) by mouth daily 30 tablet 11    meclizine (ANTIVERT) 12.5 MG tablet Take 1 tablet (12.5 mg total) by mouth 3 (three) times a day as needed for dizziness 30 tablet 0    Naproxen Sodium-Gabapentin (Gabapentin-Naproxen Cmpd Kit) 5-10 % CREA Apply 1 Application topically 3 (three) times a day as needed (right knee pain) 51 g 2    Progesterone 100 MG CAPS Take 100 mg by mouth at bedtime 30 capsule 11    ZOLMitriptan (ZOMIG) 5 MG tablet Take 1 tablet (5 mg total) by mouth once as needed for migraine for up to 1 dose 6 tablet 0    valACYclovir (VALTREX) 1,000 mg tablet Take 1 tablet (1,000 mg total) by mouth 2 (two) times a day for 5 days (Patient not taking: Reported on 8/10/2023) 10 tablet 5     Current Facility-Administered Medications on File Prior to Visit   Medication Dose Route Frequency Provider Last Rate Last Admin    cyanocobalamin injection 1,000 mcg  1,000 mcg Intramuscular Q30 Days Mona Coley DO   1,000 mcg at 12/14/23 1448        Allergies   Allergen Reactions    Pollen Extract         Tobacco Use: Low Risk  (1/23/2024)    Patient History     Smoking Tobacco Use: Never     Smokeless Tobacco Use: Never     Passive Exposure: Never        Social Determinants of Health     Tobacco Use: Low Risk  (1/23/2024)    Patient History     Smoking Tobacco Use: Never     Smokeless Tobacco Use: Never     Passive Exposure: Never   Alcohol Use: Not  At Risk (5/20/2021)    AUDIT-C     Frequency of Alcohol Consumption: Monthly or less     Average Number of Drinks: 1 or 2     Frequency of Binge Drinking: Never   Financial Resource Strain: Not on file   Food Insecurity: Not on file   Transportation Needs: Not on file   Physical Activity: Not on file   Stress: Not on file   Social Connections: Not on file   Intimate Partner Violence: Not on file   Depression: Not at risk (1/13/2020)    PHQ-2     PHQ-2 Score: 2   Housing Stability: Not on file   Utilities: Not on file   Health Literacy: Not on file               Review of Systems     There is no height or weight on file to calculate BMI.     Physical Exam  Vitals and nursing note reviewed.   HENT:      Head: Atraumatic.   Eyes:      Conjunctiva/sclera: Conjunctivae normal.   Cardiovascular:      Rate and Rhythm: Normal rate.      Pulses: Normal pulses.   Pulmonary:      Effort: Pulmonary effort is normal. No respiratory distress.   Neurological:      Mental Status: She is alert and oriented to person, place, and time.   Psychiatric:         Mood and Affect: Mood normal.         Behavior: Behavior normal.          Ortho Exam:    Body part: right knee    Inspection: No deformity.  No effusion.  No erythema.    Palpation: Palpable patellofemoral crepitus.  Medial joint line tenderness.    Range of motion: Full extension to 130 degrees of knee flexion    Special Tests: Negative valgus and varus stress test.  Discomfort on patellofemoral compression.  Negative medial and lateral Nicolle's.    Distal Neurovascular Status: Intact, Yes        Assessment:     Diagnosis ICD-10-CM Associated Orders   1. Primary osteoarthritis of right knee  M17.11            Plan:    Explained my current clinical findings to the patient today.  She has had overall good relief of symptoms with regards to her right knee pain following intra-articular viscosupplementation injection.  At this time, she is agreeable for continuation of home based  "knee exercises.  A printed handout was provided in this regard.  She may also apply topical NSAIDs if needed for symptomatic relief.        Follow-Up:    As needed        Portions of the record may have been created with voice recognition software. Occasional wrong word or \"sound alike\" substitutions may have occurred due to the inherent limitations of voice recognition software. Please review the chart carefully and recognize, using context, where substitutions/typographical errors may have occurred.           "

## 2024-01-23 NOTE — PATIENT INSTRUCTIONS
Knee Exercises   AMBULATORY CARE:   What you need to know about knee exercises:  Knee exercises help strengthen the muscles around your knee. Strong muscles can help reduce pain and decrease your risk of future injury. Knee exercises also help you heal after an injury or surgery. These are beginning exercises. Ask your healthcare provider if you need to see a physical therapist for more advanced exercises.  General guidelines for knee exercises:   Start slowly.  As you get stronger, you may be able to do more sets of each exercise or add weights.    Stop if you feel pain.  It is normal to feel some discomfort at first, but you should not feel pain. Tell your provider or physical therapist if you have pain while you exercise. Regular exercise will help decrease your discomfort over time.    Do the exercises on both legs.  Do this so both knees remain strong.    Warm up before you do knee exercises.  Walk or ride a stationary bike for 5 or 10 minutes to warm your muscles.    How to perform knee stretches safely:  Always stretch before you do strengthening exercises. Do these stretching exercises again after you do the strengthening exercises. Do these stretches 4 or 5 days a week, or as directed.  Standing calf stretch:  Face a wall and place both palms flat on the wall, or hold the back of a chair for balance. Keep a slight bend in your knees. Take a big step backward with one leg. Keep your other leg directly under you. Keep both heels flat and press your hips forward. Hold the stretch for 30 seconds, and then relax for 30 seconds. Switch legs. Repeat 2 or 3 times on each leg.         Standing quadriceps stretch:  Stand and place one hand against a wall or hold the back of a chair for balance. With your weight on one leg, bend your other leg and grab your ankle. Bring your heel toward your buttocks. Hold the stretch for 30 to 60 seconds. Switch legs. Repeat 2 or 3 times on each leg.         Sitting hamstring  stretch:  Sit with both legs straight in front of you. Do not point or flex your toes. Place your palms on the floor and slide your hands forward until you feel the stretch. Do not round your back. Hold the stretch for 30 seconds. Repeat 2 or 3 times.       How to perform knee strengthening exercises safely:  Do these exercises 4 or 5 days a week, or as directed.  Standing half squats:  Stand with your feet shoulder-width apart. Lean your back against a wall or hold the back of a chair for balance, if needed. Slowly sit down about 10 inches, as if you are going to sit in a chair. Your body weight should be mostly over your heels. Hold the squat for 5 seconds, then rise to a standing position. Do 3 sets of 10 squats to strengthen your buttocks and thighs.         Standing hamstring curls:  Face a wall and place both palms flat on the wall, or hold the back of a chair for balance. With your weight on one leg, lift your other foot as close to your buttocks as you can. Hold for 5 seconds and then lower your leg. Do 2 sets of 10 curls on each leg. This exercise strengthens the muscles in the back of your thigh.         Standing calf raises:  Face a wall and place both palms flat on the wall, or hold the back of a chair for balance. Stand up straight, and do not lean. Place all your weight on one leg by lifting the other foot off the floor. Raise the heel of the foot that is on the floor as high as you can and then lower it. Do 2 sets of 10 calf raises on each leg to strengthen your calf muscles.         Straight leg lifts:  Lie on your stomach with straight legs. Fold your arms in front of you and rest your head in your arms. Tighten your leg muscles and raise one leg as high as you can. Hold for 5 seconds, then lower your leg. Do 2 sets of 10 lifts on each leg to strengthen your buttocks.         Sitting leg lifts:  Sit in a chair. Slowly straighten and raise one leg. Squeeze your thigh muscles and hold for 5 seconds.  Relax and return your foot to the floor. Do 2 sets of 10 lifts on each leg. This helps strengthen the muscles in the front of your thigh.       Call your doctor or physical therapist if:   You have new pain or your pain becomes worse.     You have questions or concerns about your condition or care.    © Copyright [employer] 2023 Information is for End User's use only and may not be sold, redistributed or otherwise used for commercial purposes.  The above information is an  only. It is not intended as medical advice for individual conditions or treatments. Talk to your doctor, nurse or pharmacist before following any medical regimen to see if it is safe and effective for you.

## 2024-01-25 ENCOUNTER — OFFICE VISIT (OUTPATIENT)
Dept: FAMILY MEDICINE CLINIC | Facility: CLINIC | Age: 49
End: 2024-01-25
Payer: COMMERCIAL

## 2024-01-25 VITALS
WEIGHT: 208 LBS | TEMPERATURE: 98.8 F | SYSTOLIC BLOOD PRESSURE: 136 MMHG | BODY MASS INDEX: 31.52 KG/M2 | HEIGHT: 68 IN | OXYGEN SATURATION: 97 % | HEART RATE: 92 BPM | DIASTOLIC BLOOD PRESSURE: 80 MMHG | RESPIRATION RATE: 18 BRPM

## 2024-01-25 DIAGNOSIS — M54.2 TENDERNESS OF NECK: ICD-10-CM

## 2024-01-25 DIAGNOSIS — B37.2 CANDIDAL INTERTRIGO: ICD-10-CM

## 2024-01-25 DIAGNOSIS — E53.8 B12 DEFICIENCY: ICD-10-CM

## 2024-01-25 DIAGNOSIS — R20.0 NUMBNESS AND TINGLING IN BOTH HANDS: ICD-10-CM

## 2024-01-25 DIAGNOSIS — R13.19 ESOPHAGEAL DYSPHAGIA: Primary | ICD-10-CM

## 2024-01-25 DIAGNOSIS — E55.9 VITAMIN D DEFICIENCY: ICD-10-CM

## 2024-01-25 DIAGNOSIS — R07.89 CHEST WALL TENDERNESS: ICD-10-CM

## 2024-01-25 DIAGNOSIS — R20.2 NUMBNESS AND TINGLING IN BOTH HANDS: ICD-10-CM

## 2024-01-25 PROCEDURE — 99214 OFFICE O/P EST MOD 30 MIN: CPT | Performed by: NURSE PRACTITIONER

## 2024-01-25 PROCEDURE — 96372 THER/PROPH/DIAG INJ SC/IM: CPT

## 2024-01-25 RX ORDER — NYSTATIN 100000 [USP'U]/G
POWDER TOPICAL 3 TIMES DAILY
Qty: 60 G | Refills: 1 | Status: SHIPPED | OUTPATIENT
Start: 2024-01-25

## 2024-01-25 RX ADMIN — CYANOCOBALAMIN 1000 MCG: 1000 INJECTION, SOLUTION INTRAMUSCULAR; SUBCUTANEOUS at 16:56

## 2024-01-25 NOTE — PROGRESS NOTES
[facility] PRACTICE OFFICE VISIT       NAME: Ellie Cross  AGE: 48 y.o. SEX: female       : 1975        MRN: [id number]    DATE: 2024  TIME: 5:30 PM    Assessment and Plan   1. Esophageal dysphagia  -     CT neck and chest wo contrast; Future; Expected date: 2024  -     Sedimentation rate, automated; Future  -     C-reactive protein; Future    2. Tenderness of neck  -     CT neck and chest wo contrast; Future; Expected date: 2024  -     Sedimentation rate, automated; Future  -     C-reactive protein; Future    3. Chest wall tenderness  -     CT neck and chest wo contrast; Future; Expected date: 2024  -     Sedimentation rate, automated; Future  -     C-reactive protein; Future    4. B12 deficiency  -     Vitamin B12; Future  -     Vitamin D 25 hydroxy; Future  -     Lyme Total AB W Reflex to IGM/IGG; Future  -     TSH, 3rd generation with Free T4 reflex; Future; Expected date: 2024  -     Iron Panel (Includes Ferritin, Iron Sat%, Iron, and TIBC); Future  -     Sedimentation rate, automated; Future  -     C-reactive protein; Future    5. Vitamin D deficiency  -     Vitamin B12; Future  -     Vitamin D 25 hydroxy; Future  -     Lyme Total AB W Reflex to IGM/IGG; Future  -     TSH, 3rd generation with Free T4 reflex; Future; Expected date: 2024  -     Iron Panel (Includes Ferritin, Iron Sat%, Iron, and TIBC); Future  -     Sedimentation rate, automated; Future  -     C-reactive protein; Future    6. Numbness and tingling in both hands  -     Lyme Total AB W Reflex to IGM/IGG; Future  -     TSH, 3rd generation with Free T4 reflex; Future; Expected date: 2024  -     Iron Panel (Includes Ferritin, Iron Sat%, Iron, and TIBC); Future  -     Sedimentation rate, automated; Future  -     C-reactive protein; Future    7. Candidal intertrigo  -     nystatin (MYCOSTATIN) powder; Apply topically 3 (three) times a day                 Chief Complaint     Chief Complaint   Patient  presents with    Rash     Pt c/o rash under breast    Foot Pain     Pt c/o tingling toes and fingers         History of Present Illness   Ellie Cross is a 48 y.o.-year-old female who is here for c/o tingling of the fingers and toes for the past few months  Happening more frequently  Vision is getting more floaters and can have double vision in right eye, to see eye doctor in 2 weeks  Started to have vertigo, went to ENT and found to have BPPV and relieved with epley   Room spins  Us thyroid performed   Does get choking and gagging, worse with touching her neck near thyroid  No relux  Rash under breasts  Used yeast cream otc      Review of Systems   Review of Systems    Active Problem List     Patient Active Problem List   Diagnosis    Allergic rhinitis    Atypical glandular cell changes cervix of undetermined significance favor dysplasia    Major depressive disorder with single episode, in full remission (HCC)    Deviated nasal septum    Fibromyalgia    Herpes labialis    Hypothyroidism    Iron deficiency    Migraine    Obesity    Primary osteoarthritis of left knee    Vitamin B12 deficiency    Vitamin D deficiency    Cervical polyp    Mild intermittent asthma without complication    Generalized anxiety disorder    Right knee pain    B12 deficiency    Primary osteoarthritis of right knee    Mild hyperlipidemia    Lymphedema    Leg swelling    Esophageal dysphagia    Tenderness of neck    Chest wall tenderness    Numbness and tingling in both hands    Candidal intertrigo         Past Medical History:  Past Medical History:   Diagnosis Date    Allergic     Arthritis     Asthma     Breast lump     resolved [id number]    Depression     Disease of thyroid gland     Hair loss     Hemorrhage of conjunctiva, left     last assessed [id number]    Knee joint pain     right    Lyme disease     lastt assessed [id number]    Migraine     Thyroid disease        Past Surgical History:  Past Surgical History:   Procedure Laterality  Date    LUMBAR FUSION  2004    Lumbar Vertebral Fusion;      KS HYSTEROSCOPY BX ENDOMETRIUM&/POLYPC W/WO D&C N/A 2022    Procedure: DILATATION AND CURETTAGE (D&C) WITH HYSTEROSCOPY;  Surgeon: Jenny Montoya MD;  Location: [facility];  Service: Gynecology    KS HYSTEROSCOPY ENDOMETRIAL ABLATION N/A 2022    Procedure: ABLATION ENDOMETRIAL FREDDY;  Surgeon: Jenny Montoya MD;  Location: [facility];  Service: Gynecology    KS LAPAROSCOPY W/RMVL ADNEXAL STRUCTURES Bilateral 2022    Procedure: SALPINGECTOMY, LAPAROSCOPIC;  Surgeon: Jenny Montoya MD;  Location: [facility];  Service: Gynecology    SPINE SURGERY      Spinal fusion    WISDOM TOOTH EXTRACTION         Family History:  Family History   Problem Relation Age of Onset    Atrial fibrillation Mother     Hyperlipidemia Mother     Hypertension Mother     Supraventricular tachycardia Mother     Heart disease Mother     Diabetes Father             Hyperlipidemia Father     Hypertension Father             Arthritis Maternal Grandmother             Leukemia Maternal Grandmother     Cancer Maternal Grandmother         blood ca    Stroke Maternal Grandfather             Pancreatic cancer Maternal Grandfather 65    No Known Problems Sister     No Known Problems Paternal Grandmother     No Known Problems Paternal Grandfather     No Known Problems Sister     No Known Problems Paternal Aunt     No Known Problems Paternal Aunt     No Known Problems Paternal Aunt        Social History:  Social History     Socioeconomic History    Marital status:      Spouse name: Not on file    Number of children: Not on file    Years of education: Not on file    Highest education level: Not on file   Occupational History    Not on file   Tobacco Use    Smoking status: Never     Passive exposure: Never    Smokeless tobacco: Never   Vaping Use    Vaping status: Never Used   Substance and Sexual  Activity    Alcohol use: Not Currently     Comment: occ    Drug use: No    Sexual activity: Yes     Partners: Male     Birth control/protection: Condom Male   Other Topics Concern    Not on file   Social History Narrative    Caffeine use     Social Determinants of Health     Financial Resource Strain: Not on file   Food Insecurity: Not on file   Transportation Needs: Not on file   Physical Activity: Not on file   Stress: Not on file   Social Connections: Not on file   Intimate Partner Violence: Not on file   Housing Stability: Not on file       Objective     Vitals:    01/25/24 1622   BP: 136/80   Pulse: 92   Resp: 18   Temp: 98.8 °F (37.1 °C)   SpO2: 97%     Wt Readings from Last 3 Encounters:   01/25/24 94.3 kg (208 lb)   11/01/23 91.2 kg (201 lb 1 oz)   10/23/23 91.2 kg (201 lb 1 oz)       Physical Exam  Vitals and nursing note reviewed.   Constitutional:       Appearance: Normal appearance.   HENT:      Head: Normocephalic and atraumatic.      Right Ear: Tympanic membrane, ear canal and external ear normal.      Left Ear: Tympanic membrane, ear canal and external ear normal.      Nose: Nose normal.      Mouth/Throat:      Mouth: Mucous membranes are moist.   Eyes:      Extraocular Movements: Extraocular movements intact.      Conjunctiva/sclera: Conjunctivae normal.      Pupils: Pupils are equal, round, and reactive to light.   Cardiovascular:      Rate and Rhythm: Normal rate and regular rhythm.      Heart sounds: Normal heart sounds.   Pulmonary:      Effort: Pulmonary effort is normal.      Breath sounds: Normal breath sounds.   Abdominal:      General: Bowel sounds are normal.   Musculoskeletal:         General: Normal range of motion.      Cervical back: Normal range of motion and neck supple.   Skin:     General: Skin is warm and dry.      Capillary Refill: Capillary refill takes less than 2 seconds.      Findings: Rash present.             Comments: Excoriation and reddened tissue under bilat breasts    "  Neurological:      General: No focal deficit present.      Mental Status: She is alert and oriented to person, place, and time.   Psychiatric:         Mood and Affect: Mood normal.         Behavior: Behavior normal.         Pertinent Laboratory/Diagnostic Studies:  Lab Results   Component Value Date    GLUCOSE 94 03/11/2015    BUN 9 10/19/2023    CREATININE 0.61 10/19/2023    CALCIUM 9.2 10/19/2023     03/11/2015    K 4.0 10/19/2023    CO2 27 10/19/2023     10/19/2023     Lab Results   Component Value Date    ALT 14 10/19/2023    AST 18 10/19/2023    ALKPHOS 88 10/19/2023    BILITOT 0.5 03/11/2015       Lab Results   Component Value Date    WBC 6.25 05/27/2022    HGB 12.8 05/27/2022    HCT 39.8 05/27/2022    MCV 93 05/27/2022     05/27/2022       No results found for: \"TSH\"    Lab Results   Component Value Date    CHOL 204 08/13/2015     Lab Results   Component Value Date    TRIG 243 (H) 10/19/2023     Lab Results   Component Value Date    HDL 50 10/19/2023     Lab Results   Component Value Date    LDLCALC 131 (H) 10/19/2023     Lab Results   Component Value Date    HGBA1C 5.3 03/08/2023       Results for orders placed or performed in visit on 10/19/23   Comprehensive metabolic panel   Result Value Ref Range    Sodium 138 135 - 147 mmol/L    Potassium 4.0 3.5 - 5.3 mmol/L    Chloride 103 96 - 108 mmol/L    CO2 27 21 - 32 mmol/L    ANION GAP 8 mmol/L    BUN 9 5 - 25 mg/dL    Creatinine 0.61 0.60 - 1.30 mg/dL    Glucose, Fasting 86 65 - 99 mg/dL    Calcium 9.2 8.4 - 10.2 mg/dL    AST 18 13 - 39 U/L    ALT 14 7 - 52 U/L    Alkaline Phosphatase 88 34 - 104 U/L    Total Protein 7.2 6.4 - 8.4 g/dL    Albumin 3.9 3.5 - 5.0 g/dL    Total Bilirubin 0.93 0.20 - 1.00 mg/dL    eGFR 109 ml/min/1.73sq m   Lipid Panel with Direct LDL reflex   Result Value Ref Range    Cholesterol 230 (H) See Comment mg/dL    Triglycerides 243 (H) See Comment mg/dL    HDL, Direct 50 >=50 mg/dL    LDL Calculated 131 (H) 0 - 100 " mg/dL   TSH, 3rd generation with Free T4 reflex   Result Value Ref Range    TSH 3RD GENERATON 2.118 0.450 - 4.500 uIU/mL   Follicle stimulating hormone   Result Value Ref Range    FSH 6.1 See Comment mIU/mL   Estradiol   Result Value Ref Range    Estradiol 290.8 See Comment pg/mL   Progesterone   Result Value Ref Range    Progesterone 0.29 See Comment ng/mL       Orders Placed This Encounter   Procedures    CT neck and chest wo contrast    Vitamin B12    Vitamin D 25 hydroxy    Lyme Total AB W Reflex to IGM/IGG    TSH, 3rd generation with Free T4 reflex    Sedimentation rate, automated    C-reactive protein       ALLERGIES:  Allergies   Allergen Reactions    Pollen Extract        Current Medications     Current Outpatient Medications   Medication Sig Dispense Refill    acetaminophen (TYLENOL) 500 mg tablet Take 2 tablets (1,000 mg total) by mouth every 6 (six) hours as needed for mild pain or moderate pain 30 tablet 0    albuterol (ProAir HFA) 90 mcg/act inhaler Inhale 2 puffs every 6 (six) hours as needed for wheezing 8.5 g 0    ALPRAZolam (XANAX) 0.5 mg tablet Take 1 tablet (0.5 mg total) by mouth daily as needed for anxiety 30 tablet 0    ergocalciferol (VITAMIN D2) 50,000 units Take 1 capsule (50,000 Units total) by mouth once a week 12 capsule 0    ibuprofen (MOTRIN) 600 mg tablet Take 1 tablet (600 mg total) by mouth every 6 (six) hours as needed for mild pain or moderate pain 30 tablet 0    levothyroxine 125 mcg tablet Take 1 tablet (125 mcg total) by mouth daily 90 tablet 1    liothyronine (CYTOMEL) 5 mcg tablet Take 1 tablet (5 mcg total) by mouth daily 30 tablet 11    meclizine (ANTIVERT) 12.5 MG tablet Take 1 tablet (12.5 mg total) by mouth 3 (three) times a day as needed for dizziness 30 tablet 0    Naproxen Sodium-Gabapentin (Gabapentin-Naproxen Cmpd Kit) 5-10 % CREA Apply 1 Application topically 3 (three) times a day as needed (right knee pain) 51 g 2    nystatin (MYCOSTATIN) powder Apply topically 3  (three) times a day 60 g 1    Progesterone 100 MG CAPS Take 100 mg by mouth at bedtime 30 capsule 11    ZOLMitriptan (ZOMIG) 5 MG tablet Take 1 tablet (5 mg total) by mouth once as needed for migraine for up to 1 dose 6 tablet 0    Diclofenac Sodium (VOLTAREN) 1 % Apply 2 g topically 4 (four) times a day (Patient not taking: Reported on 1/25/2024) 100 g 1    valACYclovir (VALTREX) 1,000 mg tablet Take 1 tablet (1,000 mg total) by mouth 2 (two) times a day for 5 days (Patient not taking: Reported on 8/10/2023) 10 tablet 5     Current Facility-Administered Medications   Medication Dose Route Frequency Provider Last Rate Last Admin    cyanocobalamin injection 1,000 mcg  1,000 mcg Intramuscular Q30 Days Mona Coley DO   1,000 mcg at 01/25/24 1656         Health Maintenance     Health Maintenance   Topic Date Due    COVID-19 Vaccine (7 - 2023-24 season) 09/01/2023    PT PLAN OF CARE  12/10/2023    Annual Physical  03/08/2024    Depression Screening  08/10/2024    Breast Cancer Screening: Mammogram  03/23/2025    Cervical Cancer Screening  05/26/2025    Zoster Vaccine (1 of 2) 11/30/2026    Colorectal Cancer Screening  09/15/2027    DTaP,Tdap,and Td Vaccines (3 - Td or Tdap) 04/20/2029    HIV Screening  Completed    Hepatitis C Screening  Completed    Influenza Vaccine  Completed    HIB Vaccine  Aged Out    IPV Vaccine  Aged Out    Hepatitis A Vaccine  Aged Out    Meningococcal ACWY Vaccine  Aged Out    HPV Vaccine  Aged Out    Pneumococcal Vaccine: Pediatrics (0 to 5 Years) and At-Risk Patients (6 to 64 Years)  Discontinued     Immunization History   Administered Date(s) Administered    COVID-19 PFIZER VACCINE 0.3 ML IM 12/23/2020, 12/23/2020, 01/14/2021, 01/14/2021, 10/02/2021, 10/02/2021    INFLUENZA 11/01/2015, 11/01/2015, 10/10/2016, 10/10/2016, 10/08/2018, 10/08/2018, 11/01/2019, 11/01/2019, 11/22/2021, 12/07/2023    Influenza Quadrivalent Preservative Free 3 years and older IM 11/01/2015    Influenza,  injectable, quadrivalent, preservative free 0.5 mL 12/07/2023    Influenza, seasonal, injectable 10/10/2016    Influenza, seasonal, injectable, preservative free 11/01/2019    Tdap 04/20/2019, 04/20/2019          MIKE Hall

## 2024-02-01 ENCOUNTER — TELEPHONE (OUTPATIENT)
Age: 49
End: 2024-02-01

## 2024-02-01 NOTE — TELEPHONE ENCOUNTER
Spoke with patient, patient had stated the ct scan for the patient has been denied,they want the patient to have an xray done for the chest and neck. Please advise.   acuteness of illness

## 2024-02-02 ENCOUNTER — APPOINTMENT (OUTPATIENT)
Dept: LAB | Facility: CLINIC | Age: 49
End: 2024-02-02
Payer: COMMERCIAL

## 2024-02-02 DIAGNOSIS — E53.8 B12 DEFICIENCY: ICD-10-CM

## 2024-02-02 DIAGNOSIS — E55.9 VITAMIN D DEFICIENCY: ICD-10-CM

## 2024-02-02 DIAGNOSIS — R20.2 NUMBNESS AND TINGLING IN BOTH HANDS: ICD-10-CM

## 2024-02-02 DIAGNOSIS — M54.2 TENDERNESS OF NECK: ICD-10-CM

## 2024-02-02 DIAGNOSIS — R07.89 CHEST WALL TENDERNESS: ICD-10-CM

## 2024-02-02 DIAGNOSIS — R20.0 NUMBNESS AND TINGLING IN BOTH HANDS: ICD-10-CM

## 2024-02-02 DIAGNOSIS — R13.19 ESOPHAGEAL DYSPHAGIA: ICD-10-CM

## 2024-02-02 LAB
25(OH)D3 SERPL-MCNC: 24.1 NG/ML (ref 30–100)
CRP SERPL QL: 6.2 MG/L
ERYTHROCYTE [SEDIMENTATION RATE] IN BLOOD: 55 MM/HOUR (ref 0–19)
FERRITIN SERPL-MCNC: 23 NG/ML (ref 11–307)
IRON SATN MFR SERPL: 16 % (ref 15–50)
IRON SERPL-MCNC: 68 UG/DL (ref 50–212)
TIBC SERPL-MCNC: 420 UG/DL (ref 250–450)
UIBC SERPL-MCNC: 352 UG/DL (ref 155–355)
VIT B12 SERPL-MCNC: 511 PG/ML (ref 180–914)

## 2024-02-02 PROCEDURE — 86618 LYME DISEASE ANTIBODY: CPT

## 2024-02-02 PROCEDURE — 83540 ASSAY OF IRON: CPT

## 2024-02-02 PROCEDURE — 82728 ASSAY OF FERRITIN: CPT

## 2024-02-02 PROCEDURE — 82607 VITAMIN B-12: CPT

## 2024-02-02 PROCEDURE — 83550 IRON BINDING TEST: CPT

## 2024-02-02 PROCEDURE — 36415 COLL VENOUS BLD VENIPUNCTURE: CPT

## 2024-02-02 PROCEDURE — 85652 RBC SED RATE AUTOMATED: CPT

## 2024-02-02 PROCEDURE — 82306 VITAMIN D 25 HYDROXY: CPT

## 2024-02-02 PROCEDURE — 86140 C-REACTIVE PROTEIN: CPT

## 2024-02-03 LAB — B BURGDOR IGG+IGM SER QL IA: NEGATIVE

## 2024-02-05 DIAGNOSIS — M89.8X1 CLAVICLE PAIN: ICD-10-CM

## 2024-02-05 DIAGNOSIS — M54.2 NECK PAIN: Primary | ICD-10-CM

## 2024-02-06 ENCOUNTER — EMERGENCY VISIT (OUTPATIENT)
Dept: URBAN - METROPOLITAN AREA CLINIC 6 | Facility: CLINIC | Age: 49
End: 2024-02-06

## 2024-02-06 ENCOUNTER — HOSPITAL ENCOUNTER (OUTPATIENT)
Dept: RADIOLOGY | Facility: HOSPITAL | Age: 49
Discharge: HOME | End: 2024-02-06
Payer: COMMERCIAL

## 2024-02-06 DIAGNOSIS — M89.8X1 CLAVICLE PAIN: ICD-10-CM

## 2024-02-06 DIAGNOSIS — M54.2 NECK PAIN: ICD-10-CM

## 2024-02-06 DIAGNOSIS — H43.813: ICD-10-CM

## 2024-02-06 DIAGNOSIS — H04.123: ICD-10-CM

## 2024-02-06 PROCEDURE — 92014 COMPRE OPH EXAM EST PT 1/>: CPT

## 2024-02-06 PROCEDURE — 71130 X-RAY STRENOCLAVIC JT 3/>VWS: CPT

## 2024-02-06 PROCEDURE — 71046 X-RAY EXAM CHEST 2 VIEWS: CPT

## 2024-02-06 PROCEDURE — 92250 FUNDUS PHOTOGRAPHY W/I&R: CPT

## 2024-02-06 ASSESSMENT — TONOMETRY
OS_IOP_MMHG: 15
OD_IOP_MMHG: 16

## 2024-02-06 ASSESSMENT — VISUAL ACUITY
OD_SC: 20/30
OS_SC: 20/25-1

## 2024-02-19 ENCOUNTER — APPOINTMENT (OUTPATIENT)
Dept: RADIOLOGY | Facility: OTHER | Age: 49
End: 2024-02-19
Payer: COMMERCIAL

## 2024-02-19 ENCOUNTER — OFFICE VISIT (OUTPATIENT)
Dept: OBGYN CLINIC | Facility: OTHER | Age: 49
End: 2024-02-19
Payer: COMMERCIAL

## 2024-02-19 VITALS
DIASTOLIC BLOOD PRESSURE: 92 MMHG | HEIGHT: 68 IN | HEART RATE: 97 BPM | WEIGHT: 208 LBS | BODY MASS INDEX: 31.52 KG/M2 | SYSTOLIC BLOOD PRESSURE: 128 MMHG

## 2024-02-19 DIAGNOSIS — M25.562 LEFT KNEE PAIN, UNSPECIFIED CHRONICITY: Primary | ICD-10-CM

## 2024-02-19 DIAGNOSIS — M17.12 PRIMARY OSTEOARTHRITIS OF LEFT KNEE: ICD-10-CM

## 2024-02-19 DIAGNOSIS — M25.562 LEFT KNEE PAIN, UNSPECIFIED CHRONICITY: ICD-10-CM

## 2024-02-19 PROCEDURE — 99214 OFFICE O/P EST MOD 30 MIN: CPT | Performed by: ORTHOPAEDIC SURGERY

## 2024-02-19 PROCEDURE — 73562 X-RAY EXAM OF KNEE 3: CPT

## 2024-02-19 NOTE — PROGRESS NOTES
Assessment:       1. Left knee pain, unspecified chronicity    2. Primary osteoarthritis of left knee          Plan:        Explained my current clinical findings and reviewed the radiological findings with the patient today.  I suspect that her symptoms are likely secondary to some mild knee osteoarthritis.  Currently no significant mechanical symptoms of the knee are reported.  Pain intensity is mild to moderate.  She is also due to travel very soon.  Hence, she was agreeable for a trial of topical diclofenac ointment and this was prescribed on today's office visit.  I also provided her handout of knee exercises.  She is agreeable to call us back for a follow-up if symptoms significantly persist or worsen.            Subjective:     Patient ID: Ellie Cross is a 48 y.o. female.    Chief Complaint:    TRINY Argueta presents today for evaluation of left knee pain which has flared up over the last week.  She denies any injury.  Denies any erythema or warmth of the left knee.  Notices a sense of left knee buckling when climbing stairs.  Pain is primarily noted in the anterior knee with some pain radiation to the medial knee.  Also associates this with a sense of tightness in the left knee.  Does report some mild swelling of the left knee as well intermittently.  Pain intensity at this time is mild to moderate.     Social History     Occupational History    Not on file   Tobacco Use    Smoking status: Never     Passive exposure: Never    Smokeless tobacco: Never   Vaping Use    Vaping status: Never Used   Substance and Sexual Activity    Alcohol use: Not Currently     Comment: occ    Drug use: No    Sexual activity: Yes     Partners: Male     Birth control/protection: Condom Male      Review of Systems        Objective:     Left Knee Exam     Tenderness   The patient is experiencing tenderness in the medial joint line (Patellofemoral crepitus with pain).    Range of Motion   Extension:  normal   Flexion:  130     Tests    Nicolle:  Medial - negative Lateral - negative  Varus: negative Valgus: negative  Lachman:  Anterior - negative      Drawer:  Anterior - negative     Posterior - negative  Patellar apprehension: negative    Other   Erythema: absent  Swelling: mild    Comments:  Increased discomfort on patellofemoral compression.        Physical Exam  Vitals and nursing note reviewed.   Constitutional:       Appearance: She is well-developed.   HENT:      Head: Normocephalic and atraumatic.   Cardiovascular:      Rate and Rhythm: Normal rate.   Pulmonary:      Effort: Pulmonary effort is normal. No respiratory distress.   Musculoskeletal:      Left knee:      Instability Tests: Medial Nicolle test negative and lateral Nicolle test negative.   Skin:     General: Skin is warm and dry.      Findings: No erythema.   Neurological:      Mental Status: She is alert and oriented to person, place, and time.      Cranial Nerves: No cranial nerve deficit.   Psychiatric:         Behavior: Behavior normal.         Thought Content: Thought content normal.         Judgment: Judgment normal.           I have personally reviewed pertinent films in PACS and my interpretation is plain radiograph of the left knee performed today does not reveal any acute osseous injury.  There is a very small proximal patellar pole enthesophyte indicative of possible patellofemoral arthritic change.  Mild joint swelling noted.

## 2024-02-20 DIAGNOSIS — E03.8 HYPOTHYROIDISM DUE TO HASHIMOTO'S THYROIDITIS: ICD-10-CM

## 2024-02-20 DIAGNOSIS — E03.9 HYPOTHYROIDISM, UNSPECIFIED TYPE: ICD-10-CM

## 2024-02-20 DIAGNOSIS — F41.1 GENERALIZED ANXIETY DISORDER: ICD-10-CM

## 2024-02-20 DIAGNOSIS — E06.3 HYPOTHYROIDISM DUE TO HASHIMOTO'S THYROIDITIS: ICD-10-CM

## 2024-02-20 DIAGNOSIS — N95.1 PERIMENOPAUSAL SYMPTOMS: ICD-10-CM

## 2024-02-20 RX ORDER — LEVOTHYROXINE SODIUM 0.12 MG/1
125 TABLET ORAL DAILY
Qty: 90 TABLET | Refills: 0 | Status: SHIPPED | OUTPATIENT
Start: 2024-02-20

## 2024-02-20 RX ORDER — ALPRAZOLAM 0.5 MG/1
0.5 TABLET ORAL DAILY PRN
Qty: 30 TABLET | Refills: 3 | Status: SHIPPED | OUTPATIENT
Start: 2024-02-20

## 2024-02-20 NOTE — TELEPHONE ENCOUNTER
Medication:  PDMP   05/08/2023 05/08/2023 ALPRAZolam (Tablet) 30.0 30 0.5 MG LARRY CHANCE     Active agreement on file -No

## 2024-02-29 RX ORDER — LIOTHYRONINE SODIUM 5 UG/1
5 TABLET ORAL DAILY
Qty: 30 TABLET | Refills: 0 | Status: SHIPPED | OUTPATIENT
Start: 2024-02-29

## 2024-02-29 RX ORDER — PROGESTERONE 100 MG/1
100 CAPSULE ORAL
Qty: 30 CAPSULE | Refills: 0 | Status: SHIPPED | OUTPATIENT
Start: 2024-02-29

## 2024-03-11 ENCOUNTER — OFFICE VISIT (OUTPATIENT)
Dept: FAMILY MEDICINE CLINIC | Facility: CLINIC | Age: 49
End: 2024-03-11
Payer: COMMERCIAL

## 2024-03-11 VITALS
TEMPERATURE: 99 F | SYSTOLIC BLOOD PRESSURE: 110 MMHG | HEIGHT: 69 IN | RESPIRATION RATE: 14 BRPM | HEART RATE: 91 BPM | DIASTOLIC BLOOD PRESSURE: 80 MMHG | BODY MASS INDEX: 31.4 KG/M2 | WEIGHT: 212 LBS | OXYGEN SATURATION: 98 %

## 2024-03-11 DIAGNOSIS — F32.5 MAJOR DEPRESSIVE DISORDER WITH SINGLE EPISODE, IN FULL REMISSION (HCC): ICD-10-CM

## 2024-03-11 DIAGNOSIS — Z13.1 SCREENING FOR DIABETES MELLITUS: ICD-10-CM

## 2024-03-11 DIAGNOSIS — E78.5 MILD HYPERLIPIDEMIA: ICD-10-CM

## 2024-03-11 DIAGNOSIS — Z00.00 ANNUAL PHYSICAL EXAM: Primary | ICD-10-CM

## 2024-03-11 PROBLEM — R07.89 CHEST WALL TENDERNESS: Status: RESOLVED | Noted: 2024-01-25 | Resolved: 2024-03-11

## 2024-03-11 PROBLEM — M79.89 LEG SWELLING: Status: RESOLVED | Noted: 2023-09-14 | Resolved: 2024-03-11

## 2024-03-11 PROCEDURE — 99396 PREV VISIT EST AGE 40-64: CPT | Performed by: FAMILY MEDICINE

## 2024-03-11 PROCEDURE — 96372 THER/PROPH/DIAG INJ SC/IM: CPT | Performed by: FAMILY MEDICINE

## 2024-03-11 RX ADMIN — CYANOCOBALAMIN 1000 MCG: 1000 INJECTION, SOLUTION INTRAMUSCULAR; SUBCUTANEOUS at 08:09

## 2024-03-11 NOTE — PROGRESS NOTES
ADULT ANNUAL PHYSICAL  [facility]    NAME: Ellie Cross  AGE: 48 y.o. SEX: female  : 1975     DATE: 3/11/2024     Assessment and Plan:     Problem List Items Addressed This Visit     Major depressive disorder with single episode, in full remission (HCC)     stable         Mild hyperlipidemia    Relevant Orders    Lipid Panel with Direct LDL reflex   Other Visit Diagnoses     Annual physical exam    -  Primary    Screening for diabetes mellitus        Relevant Orders    Hemoglobin A1C          Immunizations and preventive care screenings were discussed with patient today. Appropriate education was printed on patient's after visit summary.    Counseling:  Dental Health: discussed importance of regular tooth brushing, flossing, and dental visits.      Depression Screening and Follow-up Plan: Patient was screened for depression during today's encounter. They screened negative with a PHQ-9 score of 3.        Return in 6 months (on 2024) for Recheck.     Chief Complaint:     Chief Complaint   Patient presents with   • Physical Exam     Patient being seen for physical       History of Present Illness:     Adult Annual Physical   Patient here for a comprehensive physical exam. The patient reports problems - seen by Cyndi-knees are bad, vertigo-seen by ENT, fingers were numb, feeling off, seen by ophtho for vision, finger and toes numb after eating, gained weight, swollen .    Diet and Physical Activity  Diet/Nutrition: well balanced diet.   Exercise: walking.      Depression Screening  PHQ-2/9 Depression Screening    Little interest or pleasure in doing things: 0 - not at all  Feeling down, depressed, or hopeless: 0 - not at all  Trouble falling or staying asleep, or sleeping too much: 1 - several days  Feeling tired or having little energy: 1 - several days  Poor appetite or overeatin - not at all  Feeling bad about yourself - or that you are a  failure or have let yourself or your family down: 0 - not at all  Trouble concentrating on things, such as reading the newspaper or watching television: 1 - several days  Moving or speaking so slowly that other people could have noticed. Or the opposite - being so fidgety or restless that you have been moving around a lot more than usual: 0 - not at all  Thoughts that you would be better off dead, or of hurting yourself in some way: 0 - not at all  PHQ-9 Score: 3  PHQ-9 Interpretation: No or Minimal depression       General Health  Sleep: sleeps poorly.   Hearing: normal - bilateral.  Vision: goes for regular eye exams.   Dental: regular dental visits.       /GYN Health  Follows with gynecology? yes   Patient is: perimenopausal  Last menstrual period: albation  Contraceptive method:  progesterone .    Advanced Care Planning  Do you have an advanced directive? no  Do you have a durable medical power of ? no  ACP document given to the patient? yes     Review of Systems:     Review of Systems   Constitutional:  Positive for fatigue.   HENT: Negative.     Eyes: Negative.    Respiratory: Negative.     Cardiovascular: Negative.    Gastrointestinal: Negative.    Endocrine: Negative.    Genitourinary: Negative.    Musculoskeletal:  Positive for arthralgias.   Allergic/Immunologic: Negative.    Neurological:  Positive for numbness (finger and toes).   Hematological: Negative.    Psychiatric/Behavioral: Negative.        Past Medical History:     Past Medical History:   Diagnosis Date   • Allergic    • Arthritis    • Asthma    • Breast lump     resolved [id number]   • Depression    • Disease of thyroid gland    • Hair loss    • Hemorrhage of conjunctiva, left     last assessed [id number]   • Knee joint pain     right   • Lyme disease     lastt assessed [id number]   • Migraine    • Thyroid disease       Past Surgical History:     Past Surgical History:   Procedure Laterality Date   • LUMBAR FUSION  01/01/2004    Lumbar  Vertebral Fusion;     • [place] HYSTEROSCOPY BX ENDOMETRIUM&/POLYPC W/WO D&C N/A 2022    Procedure: DILATATION AND CURETTAGE (D&C) WITH HYSTEROSCOPY;  Surgeon: Jenny Montoya MD;  Location: [facility];  Service: Gynecology   • NM HYSTEROSCOPY ENDOMETRIAL ABLATION N/A 2022    Procedure: ABLATION ENDOMETRIAL FREDDY;  Surgeon: Jenny Montoya MD;  Location: [facility];  Service: Gynecology   • NM LAPAROSCOPY W/RMVL ADNEXAL STRUCTURES Bilateral 2022    Procedure: SALPINGECTOMY, LAPAROSCOPIC;  Surgeon: Jenny Montoya MD;  Location: [facility];  Service: Gynecology   • SPINE SURGERY      Spinal fusion   • WISDOM TOOTH EXTRACTION        Social History:     Social History     Socioeconomic History   • Marital status:      Spouse name: None   • Number of children: None   • Years of education: None   • Highest education level: None   Occupational History   • None   Tobacco Use   • Smoking status: Never     Passive exposure: Never   • Smokeless tobacco: Never   Vaping Use   • Vaping status: Never Used   Substance and Sexual Activity   • Alcohol use: Yes     Comment: occ   • Drug use: No   • Sexual activity: Yes     Partners: Male     Birth control/protection: Condom Male   Other Topics Concern   • None   Social History Narrative    Caffeine use     Social Determinants of Health     Financial Resource Strain: Not on file   Food Insecurity: Not on file   Transportation Needs: Not on file   Physical Activity: Not on file   Stress: Not on file   Social Connections: Not on file   Intimate Partner Violence: Not on file   Housing Stability: Not on file      Family History:     Family History   Problem Relation Age of Onset   • Atrial fibrillation Mother    • Hyperlipidemia Mother    • Hypertension Mother    • Supraventricular tachycardia Mother    • Heart disease Mother    • Diabetes Father            • Hyperlipidemia Father    • Hypertension Father            •  Arthritis Maternal Grandmother            • Leukemia Maternal Grandmother    • Cancer Maternal Grandmother         blood ca   • Stroke Maternal Grandfather            • Pancreatic cancer Maternal Grandfather 65   • No Known Problems Sister    • No Known Problems Paternal Grandmother    • No Known Problems Paternal Grandfather    • No Known Problems Sister    • No Known Problems Paternal Aunt    • No Known Problems Paternal Aunt    • No Known Problems Paternal Aunt       Current Medications:     Current Outpatient Medications   Medication Sig Dispense Refill   • acetaminophen (TYLENOL) 500 mg tablet Take 2 tablets (1,000 mg total) by mouth every 6 (six) hours as needed for mild pain or moderate pain 30 tablet 0   • albuterol (ProAir HFA) 90 mcg/act inhaler Inhale 2 puffs every 6 (six) hours as needed for wheezing 8.5 g 0   • ALPRAZolam (XANAX) 0.5 mg tablet Take 1 tablet (0.5 mg total) by mouth daily as needed for anxiety 30 tablet 3   • Diclofenac Sodium (VOLTAREN) 1 % Apply 2 g topically 3 (three) times a day as needed (for left knee pain) 50 g 1   • ergocalciferol (VITAMIN D2) 50,000 units Take 1 capsule (50,000 Units total) by mouth once a week 12 capsule 0   • ibuprofen (MOTRIN) 600 mg tablet Take 1 tablet (600 mg total) by mouth every 6 (six) hours as needed for mild pain or moderate pain 30 tablet 0   • levothyroxine 125 mcg tablet Take 1 tablet (125 mcg total) by mouth daily 90 tablet 0   • liothyronine (CYTOMEL) 5 mcg tablet Take 1 tablet (5 mcg total) by mouth daily 30 tablet 0   • meclizine (ANTIVERT) 12.5 MG tablet Take 1 tablet (12.5 mg total) by mouth 3 (three) times a day as needed for dizziness 30 tablet 0   • nystatin (MYCOSTATIN) powder Apply topically 3 (three) times a day 60 g 1   • Progesterone 100 MG CAPS Take 100 mg by mouth at bedtime 30 capsule 0   • ZOLMitriptan (ZOMIG) 5 MG tablet Take 1 tablet (5 mg total) by mouth once as needed for migraine for up to 1 dose 6 tablet 0   •  "valACYclovir (VALTREX) 1,000 mg tablet Take 1 tablet (1,000 mg total) by mouth 2 (two) times a day for 5 days (Patient not taking: Reported on 8/10/2023) 10 tablet 5     Current Facility-Administered Medications   Medication Dose Route Frequency Provider Last Rate Last Admin   • cyanocobalamin injection 1,000 mcg  1,000 mcg Intramuscular Q30 Days Mona Coley DO   1,000 mcg at 01/25/24 1016      Allergies:     Allergies   Allergen Reactions   • Pollen Extract       Physical Exam:     /80 (BP Location: Left arm, Patient Position: Sitting, Cuff Size: Large)   Pulse 91   Temp 99 °F (37.2 °C) (Tympanic)   Resp 14   Ht 5' 8.98\" (1.752 m)   Wt 96.2 kg (212 lb)   SpO2 98%   BMI 31.33 kg/m²     Physical Exam  Vitals and nursing note reviewed.   Constitutional:       Appearance: Normal appearance. She is well-developed.   HENT:      Head: Normocephalic and atraumatic.      Right Ear: Tympanic membrane, ear canal and external ear normal.      Left Ear: Tympanic membrane, ear canal and external ear normal.      Nose: Nose normal.   Eyes:      Extraocular Movements: Extraocular movements intact.      Conjunctiva/sclera: Conjunctivae normal.      Pupils: Pupils are equal, round, and reactive to light.   Cardiovascular:      Rate and Rhythm: Normal rate and regular rhythm.      Heart sounds: Normal heart sounds.   Pulmonary:      Effort: Pulmonary effort is normal.      Breath sounds: Normal breath sounds.   Abdominal:      General: Abdomen is flat. Bowel sounds are normal.      Palpations: Abdomen is soft.   Musculoskeletal:         General: Normal range of motion.      Cervical back: Normal range of motion and neck supple.   Skin:     General: Skin is warm and dry.      Capillary Refill: Capillary refill takes less than 2 seconds.   Neurological:      General: No focal deficit present.      Mental Status: She is alert and oriented to person, place, and time.   Psychiatric:         Mood and Affect: Mood normal.    "      Behavior: Behavior normal.         Thought Content: Thought content normal.         Judgment: Judgment normal.          DO DOMINIQUE Valero [facility]

## 2024-04-02 ENCOUNTER — HOSPITAL ENCOUNTER (OUTPATIENT)
Dept: RADIOLOGY | Age: 49
Discharge: HOME | End: 2024-04-02
Payer: COMMERCIAL

## 2024-04-02 DIAGNOSIS — Z12.31 VISIT FOR SCREENING MAMMOGRAM: ICD-10-CM

## 2024-04-02 PROCEDURE — 77067 SCR MAMMO BI INCL CAD: CPT

## 2024-04-02 PROCEDURE — 77063 BREAST TOMOSYNTHESIS BI: CPT

## 2024-04-05 ENCOUNTER — ANNUAL EXAM (OUTPATIENT)
Dept: OBGYN CLINIC | Facility: CLINIC | Age: 49
End: 2024-04-05
Payer: COMMERCIAL

## 2024-04-05 VITALS
BODY MASS INDEX: 32.13 KG/M2 | SYSTOLIC BLOOD PRESSURE: 134 MMHG | DIASTOLIC BLOOD PRESSURE: 88 MMHG | WEIGHT: 212 LBS | HEIGHT: 68 IN

## 2024-04-05 DIAGNOSIS — N63.11 BREAST LUMP ON RIGHT SIDE AT 10 O'CLOCK POSITION: ICD-10-CM

## 2024-04-05 DIAGNOSIS — Z01.419 WOMEN'S ANNUAL ROUTINE GYNECOLOGICAL EXAMINATION: Primary | ICD-10-CM

## 2024-04-05 DIAGNOSIS — Z12.4 SCREENING FOR CERVICAL CANCER: ICD-10-CM

## 2024-04-05 PROCEDURE — G0476 HPV COMBO ASSAY CA SCREEN: HCPCS | Performed by: STUDENT IN AN ORGANIZED HEALTH CARE EDUCATION/TRAINING PROGRAM

## 2024-04-05 PROCEDURE — S0612 ANNUAL GYNECOLOGICAL EXAMINA: HCPCS | Performed by: STUDENT IN AN ORGANIZED HEALTH CARE EDUCATION/TRAINING PROGRAM

## 2024-04-05 NOTE — PROGRESS NOTES
[facility]   Annual Well Woman Exam  [facility]oya    ASSESSMENT & PLAN: Ellie Cross is a 48 y.o.  with gynecologic exam notable for right breast lump.    1.  Routine well woman exam done today.  2.  Pap and HPV: Pap with HPV was done today.  Current ASCCP Guidelines reviewed.  3.  Breast imaging ordered for palpable lump in right breast   4.  Ellie is low risk and does not need or desire STI testing  5.  Ellie is using LSC BS for contraception and options have been discussed.    6. The following were reviewed in today's visit: adequate intake of calcium and vitamin D, exercise, healthy diet.  7. Return to office in 12 months for annual, sooner PRN.     All questions answered to the best of my ability.      Subjective:    CC:  Annual Gynecologic Examination    HPI: Ellie Cross is a 48 y.o.  who presents for annual gynecologic examination.      Pap 2022: NILM, Hpv neg  Mammo 2024: BIRADS1  Colonoscopy 2022, repeat 5 years    Periods spacing out a bit  Not as heavy as they were before  No pelvic pain    Not currently dating     No weird discharge  No dyspareunia  No pain at incision       She does report hot flashes, night sweats, insomnia, hair loss.     Sexually active: same partner  Birth control: s/p LSC BS   Hx STI: denies, declines testing today                OB        G/U  Complaints: denies  Denies hematuria, urinary incontinence and dysuria     Breast  Complaints: denies  Denies: breast lump, breast tenderness, nipple discharge and skin color change  Family hx: denies fhx of breast, uterine, ovarian, or colon cancers  Patient does do regular self-exams     Health Maintenance:    She does follow with a PCP.  She exercises: was walking regularly  She does practice breast self awareness.       She does follow a well balanced diet--> appetite changed with recent covid infection--plenty of fruits and veggies,   She does not use tobacco.      Past Medical History:   Diagnosis  Date    Abnormal Pap smear of cervix     Allergic     Arthritis     Asthma     Breast lump     resolved [id number]    Depression     Disease of thyroid gland     Hair loss     Hemorrhage of conjunctiva, left     last assessed [id number]    Knee joint pain     right    Lyme disease     lastt assessed [id number]    Migraine     Thyroid disease        Past Surgical History:   Procedure Laterality Date    LUMBAR FUSION  2004    Lumbar Vertebral Fusion;      [place] HYSTEROSCOPY BX ENDOMETRIUM&/POLYPC W/WO D&C N/A 2022    Procedure: DILATATION AND CURETTAGE (D&C) WITH HYSTEROSCOPY;  Surgeon: Jenny Montoya MD;  Location: [facility];  Service: Gynecology    [place] HYSTEROSCOPY ENDOMETRIAL ABLATION N/A 2022    Procedure: ABLATION ENDOMETRIAL FREDDY;  Surgeon: Jenny Montoya MD;  Location: [facility];  Service: Gynecology    [place] LAPAROSCOPY W/RMVL ADNEXAL STRUCTURES Bilateral 2022    Procedure: SALPINGECTOMY, LAPAROSCOPIC;  Surgeon: Jenny Montoya MD;  Location: [facility];  Service: Gynecology    SPINE SURGERY      Spinal fusion    WISDOM TOOTH EXTRACTION         Past OB/Gyn History:     No LMP recorded (lmp unknown). Patient has had an ablation.    Family History:  Family History   Problem Relation Age of Onset    Atrial fibrillation Mother     Hyperlipidemia Mother     Hypertension Mother     Supraventricular tachycardia Mother     Heart disease Mother     Diabetes Father             Hyperlipidemia Father     Hypertension Father             Arthritis Maternal Grandmother             Leukemia Maternal Grandmother     Cancer Maternal Grandmother         blood ca    Stroke Maternal Grandfather             Pancreatic cancer Maternal Grandfather 65    No Known Problems Sister     No Known Problems Paternal Grandmother     No Known Problems Paternal Grandfather     No Known Problems Sister     No Known Problems Paternal Aunt     No Known Problems  Paternal Aunt     No Known Problems Paternal Aunt        Social History:  Social History     Socioeconomic History    Marital status:      Spouse name: Not on file    Number of children: Not on file    Years of education: Not on file    Highest education level: Not on file   Occupational History    Not on file   Tobacco Use    Smoking status: Never     Passive exposure: Never    Smokeless tobacco: Never   Vaping Use    Vaping status: Never Used   Substance and Sexual Activity    Alcohol use: Not Currently     Comment: occ    Drug use: No    Sexual activity: Yes     Partners: Male     Birth control/protection: Condom Male   Other Topics Concern    Not on file   Social History Narrative    Caffeine use     Social Determinants of Health     Financial Resource Strain: Not on file   Food Insecurity: Not on file   Transportation Needs: Not on file   Physical Activity: Not on file   Stress: Not on file   Social Connections: Not on file   Intimate Partner Violence: Not on file   Housing Stability: Not on file     Allergies   Allergen Reactions    Pollen Extract        Current Outpatient Medications:     acetaminophen (TYLENOL) 500 mg tablet, Take 2 tablets (1,000 mg total) by mouth every 6 (six) hours as needed for mild pain or moderate pain, Disp: 30 tablet, Rfl: 0    albuterol (ProAir HFA) 90 mcg/act inhaler, Inhale 2 puffs every 6 (six) hours as needed for wheezing, Disp: 8.5 g, Rfl: 0    ALPRAZolam (XANAX) 0.5 mg tablet, Take 1 tablet (0.5 mg total) by mouth daily as needed for anxiety, Disp: 30 tablet, Rfl: 3    Diclofenac Sodium (VOLTAREN) 1 %, Apply 2 g topically 3 (three) times a day as needed (for left knee pain), Disp: 50 g, Rfl: 1    ergocalciferol (VITAMIN D2) 50,000 units, Take 1 capsule (50,000 Units total) by mouth once a week, Disp: 12 capsule, Rfl: 0    ibuprofen (MOTRIN) 600 mg tablet, Take 1 tablet (600 mg total) by mouth every 6 (six) hours as needed for mild pain or moderate pain, Disp: 30  "tablet, Rfl: 0    levothyroxine 125 mcg tablet, Take 1 tablet (125 mcg total) by mouth daily, Disp: 90 tablet, Rfl: 0    liothyronine (CYTOMEL) 5 mcg tablet, Take 1 tablet (5 mcg total) by mouth daily, Disp: 30 tablet, Rfl: 0    meclizine (ANTIVERT) 12.5 MG tablet, Take 1 tablet (12.5 mg total) by mouth 3 (three) times a day as needed for dizziness, Disp: 30 tablet, Rfl: 0    nystatin (MYCOSTATIN) powder, Apply topically 3 (three) times a day, Disp: 60 g, Rfl: 1    Progesterone 100 MG CAPS, Take 100 mg by mouth at bedtime, Disp: 30 capsule, Rfl: 0    ZOLMitriptan (ZOMIG) 5 MG tablet, Take 1 tablet (5 mg total) by mouth once as needed for migraine for up to 1 dose, Disp: 6 tablet, Rfl: 0    valACYclovir (VALTREX) 1,000 mg tablet, Take 1 tablet (1,000 mg total) by mouth 2 (two) times a day for 5 days (Patient not taking: Reported on 8/10/2023), Disp: 10 tablet, Rfl: 5    Current Facility-Administered Medications:     cyanocobalamin injection 1,000 mcg, 1,000 mcg, Intramuscular, Q30 Days, Mona Coley DO, 1,000 mcg at 03/11/24 0809    Review of Systems:  Denies chest pain, shortness of breath, abdominal pain, nausea, vomiting, urinary incontinence, urinary frequency, vaginal bleeding, vaginal discharge. All other systems negative unless otherwise stated.     Physical Exam:  /88 (BP Location: Left arm, Patient Position: Sitting, Cuff Size: Large)   Ht 5' 8\" (1.727 m)   Wt 96.2 kg (212 lb)   LMP  (LMP Unknown)   BMI 32.23 kg/m²  Body mass index is 32.23 kg/m².   GEN: The patient was alert and oriented x3, pleasant well-appearing female in no acute distress.   HEENT:  Unremarkable, no anterior or posterior lymphadenopathy, no thyromegaly  CV:  Regular rate and rhythm, normal S1 and S2, no murmurs  RESP:  Clear to auscultation bilaterally, no wheezes, rales or rhonchi  BREAST:  mobile, tender lump at 10-11 o'clock, otherwise symmetric breasts with no palpable breast masses or obvious breast lesions. She has no " retractions or nipple discharge. She has no axillary abnormalities or palpable masses.   GI:  Soft, nontender, non-distended  MSK: bilateral lower extremities are nontender, no edema  : Normal appearing external female genitalia, normal appearing urethral meatus. On sterile speculum exam, normal appearing vaginal epithelium, no vaginal discharge, no bleeding, grossly normal appearing cervix. On bimanual exam, no cervical motion tenderness; uterus is smooth, mobile, non-tender, normal size. No tenderness or fullness in the bilateral adnexa.

## 2024-04-15 LAB
LAB AP GYN PRIMARY INTERPRETATION: NORMAL
Lab: NORMAL

## 2024-05-14 ENCOUNTER — OFFICE VISIT (OUTPATIENT)
Dept: DERMATOLOGY | Facility: CLINIC | Age: 49
End: 2024-05-14
Payer: COMMERCIAL

## 2024-05-14 DIAGNOSIS — C44.212: ICD-10-CM

## 2024-05-14 DIAGNOSIS — D22.71 MULTIPLE BENIGN MELANOCYTIC NEVI OF BOTH UPPER EXTREMITIES, BOTH LOWER EXTREMITIES, AND TRUNK: ICD-10-CM

## 2024-05-14 DIAGNOSIS — D22.61 MULTIPLE BENIGN MELANOCYTIC NEVI OF BOTH UPPER EXTREMITIES, BOTH LOWER EXTREMITIES, AND TRUNK: ICD-10-CM

## 2024-05-14 DIAGNOSIS — D22.62 MULTIPLE BENIGN MELANOCYTIC NEVI OF BOTH UPPER EXTREMITIES, BOTH LOWER EXTREMITIES, AND TRUNK: ICD-10-CM

## 2024-05-14 DIAGNOSIS — D22.5 MULTIPLE BENIGN MELANOCYTIC NEVI OF BOTH UPPER EXTREMITIES, BOTH LOWER EXTREMITIES, AND TRUNK: ICD-10-CM

## 2024-05-14 DIAGNOSIS — Z12.83 SKIN EXAM, SCREENING FOR CANCER: Primary | ICD-10-CM

## 2024-05-14 DIAGNOSIS — L82.1 SEBORRHEIC KERATOSES: ICD-10-CM

## 2024-05-14 DIAGNOSIS — D22.72 MULTIPLE BENIGN MELANOCYTIC NEVI OF BOTH UPPER EXTREMITIES, BOTH LOWER EXTREMITIES, AND TRUNK: ICD-10-CM

## 2024-05-14 DIAGNOSIS — D48.5 NEOPLASM OF UNCERTAIN BEHAVIOR OF SKIN: ICD-10-CM

## 2024-05-14 DIAGNOSIS — L81.4 SOLAR LENTIGO: ICD-10-CM

## 2024-05-14 DIAGNOSIS — D18.01 CHERRY ANGIOMA: ICD-10-CM

## 2024-05-14 PROCEDURE — 11102 TANGNTL BX SKIN SINGLE LES: CPT | Performed by: DERMATOLOGY

## 2024-05-14 PROCEDURE — 11103 TANGNTL BX SKIN EA SEP/ADDL: CPT | Performed by: DERMATOLOGY

## 2024-05-14 PROCEDURE — 88305 TISSUE EXAM BY PATHOLOGIST: CPT | Performed by: DERMATOLOGY

## 2024-05-14 PROCEDURE — 99204 OFFICE O/P NEW MOD 45 MIN: CPT | Performed by: DERMATOLOGY

## 2024-05-14 NOTE — PROGRESS NOTES
"[facility] Dermatology Clinic Note     Patient Name: Ellie Cross  Encounter Date: 5/14/2024     Have you been cared for by a [facility] Dermatologist in the last 3 years and, if so, which description applies to you?    NO.   I am considered a \"new\" patient and must complete all patient intake questions. I am FEMALE/of child-bearing potential.    REVIEW OF SYSTEMS:  Have you recently had or currently have any of the following? Recent fever or chills? No  Any non-healing wound? No  Are you pregnant or planning to become pregnant? No  Are you currently or planning to be nursing or breast feeding? No   PAST MEDICAL HISTORY:  Have you personally ever had or currently have any of the following?  If \"YES,\" then please provide more detail. Skin cancer (such as Melanoma, Basal Cell Carcinoma, Squamous Cell Carcinoma?  No  Tuberculosis, HIV/AIDS, Hepatitis B or C: No  Radiation Treatment No   HISTORY OF IMMUNOSUPPRESSION:   Do you have a history of any of the following:  Systemic Immunosuppression such as Diabetes, Biologic or Immunotherapy, Chemotherapy, Organ Transplantation, Bone Marrow Transplantation?  No    Answering \"YES\" requires the addition of the dotphrase \"IMMUNOSUPPRESSED\" as the first diagnosis of the patient's visit.   FAMILY HISTORY:  Any \"first degree relatives\" (parent, brother, sister, or child) with the following?    Skin Cancer, Pancreatic or Other Cancer? No   PATIENT EXPERIENCE:    Do you want the Dermatologist to perform a COMPLETE skin exam today including a clinical examination under the \"bra and underwear\" areas?  Yes  If necessary, do we have your permission to call and leave a detailed message on your Preferred Phone number that includes your specific medical information?  Yes      Allergies   Allergen Reactions    Pollen Extract       Current Outpatient Medications:     acetaminophen (TYLENOL) 500 mg tablet, Take 2 tablets (1,000 mg total) by mouth every 6 (six) hours as needed for mild pain " or moderate pain, Disp: 30 tablet, Rfl: 0    albuterol (ProAir HFA) 90 mcg/act inhaler, Inhale 2 puffs every 6 (six) hours as needed for wheezing, Disp: 8.5 g, Rfl: 0    ALPRAZolam (XANAX) 0.5 mg tablet, Take 1 tablet (0.5 mg total) by mouth daily as needed for anxiety, Disp: 30 tablet, Rfl: 3    Diclofenac Sodium (VOLTAREN) 1 %, Apply 2 g topically 3 (three) times a day as needed (for left knee pain), Disp: 50 g, Rfl: 1    ergocalciferol (VITAMIN D2) 50,000 units, Take 1 capsule (50,000 Units total) by mouth once a week, Disp: 12 capsule, Rfl: 0    ibuprofen (MOTRIN) 600 mg tablet, Take 1 tablet (600 mg total) by mouth every 6 (six) hours as needed for mild pain or moderate pain, Disp: 30 tablet, Rfl: 0    levothyroxine 125 mcg tablet, Take 1 tablet (125 mcg total) by mouth daily, Disp: 90 tablet, Rfl: 0    liothyronine (CYTOMEL) 5 mcg tablet, Take 1 tablet (5 mcg total) by mouth daily, Disp: 30 tablet, Rfl: 0    meclizine (ANTIVERT) 12.5 MG tablet, Take 1 tablet (12.5 mg total) by mouth 3 (three) times a day as needed for dizziness, Disp: 30 tablet, Rfl: 0    nystatin (MYCOSTATIN) powder, Apply topically 3 (three) times a day, Disp: 60 g, Rfl: 1    Progesterone 100 MG CAPS, Take 100 mg by mouth at bedtime, Disp: 30 capsule, Rfl: 0    valACYclovir (VALTREX) 1,000 mg tablet, Take 1 tablet (1,000 mg total) by mouth 2 (two) times a day for 5 days (Patient not taking: Reported on 8/10/2023), Disp: 10 tablet, Rfl: 5    ZOLMitriptan (ZOMIG) 5 MG tablet, Take 1 tablet (5 mg total) by mouth once as needed for migraine for up to 1 dose, Disp: 6 tablet, Rfl: 0    Current Facility-Administered Medications:     cyanocobalamin injection 1,000 mcg, 1,000 mcg, Intramuscular, Q30 Days, Mona Coley DO, 1,000 mcg at 03/11/24 0809        Whom besides the patient is providing clinical information about today's encounter?   NO ADDITIONAL HISTORIAN (patient alone provided history)    Physical Exam and Assessment/Plan by  Diagnosis:    SEBORRHEIC KERATOSES  - Relevant exam: Scattered over the trunk/extremities are waxy brown to black plaques and papules with stuck on appearance and consistent dermoscopy  - Exam and clinical history consistent with seborrheic keratoses  - Counseled that these are benign growths that do not require treatment    MELANOCYTIC NEVI  -Relevant exam: Scattered over the trunk/extremities are homogenously pigmented brown macules and papules. ELM performed and without concerning findings. No outliers unless otherwise noted in today's note  - Exam and clinical history consistent with melanocytic nevi  - Counseled to return to clinic prior to scheduled appointment should any of these lesions change or should any new lesions of concern arise  - Counseled on use of sun protection daily. Reviewed latest FDA sunscreen guidelines, including use of broad spectrum (UVA and UVB blocking) sunscreen or sun protective clothing with SPF 30-50 every 2-3 hours and reapplied after exposure to water    LENTIGINES  OTHER SKIN CHANGES DUE TO CHRONIC EXPOSURE TO NONIONIZING RADIATION  - Relevant exam: Over sun exposed areas are brown macules. ELM performed and without concerning findings.  - Exam and clinical history consistent with lentigines.  - Counseled to return to clinic prior to scheduled appointment should any of these lesions change or should any new lesions of concern arise.  - Recommended use of sunscreen as above and below.    CHERRY ANGIOMAS  - Relevant exam: Scattered over the trunk/extremities are red papules  - Exam and clinical history consistent with cherry angiomas  - Educated that these are benign    NEOPLASM OF UNCERTAIN BEHAVIOR OF SKIN    Physical Exam:  (Anatomic Location); (Size and Morphological Description); (Differential Diagnosis):  A. Right conchal bowl; 5 mm pearly papule; ddx r/o BCC vs nevus  B. Left upper abdomen: 5 mm irregularly pigmented papule. Differential diagnosis: benign vs atypical nevus;  "rule out melanoma      Additional History of Present Condition:  patient notes it has been there for several months. She is unsure when it presented as someone pointed it out. Ear lesion is new. She thinks the abdomen lesion may have changed and is dark.    Assessment and Plan:  I have discussed with the patient that a sample of skin via a \"skin biopsy” would be potentially helpful to further make a specific diagnosis under the microscope.  Based on a thorough discussion of this condition and the management approach to it (including a comprehensive discussion of the known risks, side effects and potential benefits of treatment), the patient (family) agrees to implement the following specific plan:    Procedure:  Skin Biopsy.  After a thorough discussion of treatment options and risk/benefits/alternatives (including but not limited to local pain, scarring, dyspigmentation, blistering, possible superinfection, and inability to confirm a diagnosis via histopathology), verbal and written consent were obtained and portion of the rash was biopsied for tissue sample.  See below for consent that was obtained from patient and subsequent Procedure Note.    PROCEDURE TANGENTIAL (SHAVE) BIOPSY x 2 NOTE:    Performing Physician:   Anatomic Location; Clinical Description with size (cm); Pre-Op Diagnosis:   A. Right conchal bowl; 5 mm pearly papule; ddx r/o BCC vs nevus  B. Left upper abdomen: 5 mm irregularly pigmented papule. Differential diagnosis: benign vs atypical nevus; rule out melanoma    Post-op diagnosis: Same     Local anesthesia: 2% Lidocaine  HCL     Topical anesthesia: None    Hemostasis: Aluminum chloride       After obtaining informed consent  at which time there was a discussion about the purpose of biopsy  and low risks of infection and bleeding.  The area was prepped and draped in the usual fashion. Anesthesia was obtained with 1% lidocaine with epinephrine. A shave biopsy to an appropriate sampling " "depth was obtained by Shave (Dermablade or 15 blade) on the abdomen, and a 5 mm curette on the right ear. The resulting wound was covered with surgical ointment and bandaged appropriately.     The patient tolerated the procedure well without complications and was without signs of functional compromise.      Specimen has been sent for review by Dermatopathology.    Standard post-procedure care has been explained and has been included in written form within the patient's copy of Informed Consent.    INFORMED CONSENT DISCUSSION AND POST-OPERATIVE INSTRUCTIONS FOR PATIENT    I.  RATIONALE FOR PROCEDURE  I understand that a skin biopsy allows the Dermatologist to test a lesion or rash under the microscope to obtain a diagnosis.  It usually involves numbing the area with numbing medication and removing a small piece of skin; sometimes the area will be closed with sutures. In this specific procedure, sutures are not usually needed.  If any sutures are placed, then they are usually need to be removed in 2 weeks or less.    I understand that my Dermatologist recommends that a skin \"shave\" biopsy be performed today.  A local anesthetic, similar to the kind that a dentist uses when filling a cavity, will be injected with a very small needle into the skin area to be sampled.  The injected skin and tissue underneath \"will go to sleep” and become numb so no pain should be felt afterwards.  An instrument shaped like a tiny \"razor blade\" (shave biopsy instrument) will be used to cut a small piece of tissue and skin from the area so that a sample of tissue can be taken and examined more closely under the microscope.  A slight amount of bleeding will occur, but it will be stopped with direct pressure and a pressure bandage and any other appropriate methods.  I understands that a scar will form where the wound was created.  Surgical ointment will be applied to help protect the wound.  Sutures are not usually needed.    II.  RISKS AND " "POTENTIAL COMPLICATIONS   I understand the risks and potential complications of a skin biopsy include but are not limited to the following:  Bleeding  Infection  Pain  Scar/keloid  Skin discoloration  Incomplete Removal  Recurrence  Nerve Damage/Numbness/Loss of Function  Allergic Reaction to Anesthesia  Biopsies are diagnostic procedures and based on findings additional treatment or evaluation may be required  Loss or destruction of specimen resulting in no additional findings    My Dermatologist has explained to me the nature of the condition, the nature of the procedure, and the benefits to be reasonably expected compared with alternative approaches.  My Dermatologist has discussed the likelihood of major risks or complications of this procedure including the specific risks listed above, such as bleeding, infection, and scarring/keloid.  I understand that a scar is expected after this procedure.  I understand that my physician cannot predict if the scar will form a \"keloid,\" which extends beyond the borders of the wound that is created.  A keloid is a thick, painful, and bumpy scar.  A keloid can be difficult to treat, as it does not always respond well to therapy, which includes injecting cortisone directly into the keloid every few weeks.  While this usually reduces the pain and size of the scar, it does not eliminate it.      I understand that photographs may be taken before and after the procedure.  These will be maintained as part of the medical providers confidential records and may not be made available to me.  I further authorize the medical provider to use the photographs for teaching purposes or to illustrate scientific papers, books, or lectures if in his/her judgment, medical research, education, or science may benefit from its use.    I have had an opportunity to fully inquire about the risks and benefits of this procedure and its alternatives.   I have been given ample time and opportunity to ask " "questions and to seek a second opinion if I wished to do so.  I acknowledge that there have specifically been no guarantees as to the cosmetic results from the procedure.  I am aware that with any procedure there is always the possibility of an unexpected complication.    III. POST-PROCEDURAL CARE (WHAT YOU WILL NEED TO DO \"AFTER THE BIOPSY\" TO OPTIMIZE HEALING)    Keep the area clean and dry.  Try NOT to remove the bandage or get it wet for the first 24 hours.    Gently clean the area and apply surgical ointment (such as Vaseline petrolatum ointment, which is available \"over the counter\" and not a prescription) to the biopsy site for up to 2 weeks straight.  This acts to protect the wound from the outside world.      Sutures are not usually placed in this procedure.  If any sutures were placed, return for suture removal as instructed (generally 1 week for the face, 2 weeks for the body).      Take Acetaminophen (Tylenol) for discomfort, if no contraindications.  Ibuprofen or aspirin could make bleeding worse.    Call our office immediately for signs of infection: fever, chills, increased redness, warmth, tenderness, discomfort/pain, or pus or foul smell coming from the wound.    WHAT TO DO IF THERE IS ANY BLEEDING?  If a small amount of bleeding is noticed, place a clean cloth over the area and apply firm pressure for ten minutes.  Check the wound after 10 minutes of direct pressure.  If bleeding persists, try one more time for an additional 10 minutes of direct pressure on the area.  If the bleeding becomes heavier or does not stop after the second attempt, or if you have any other questions about this procedure, then please call your [facility]'s Dermatologist by calling [phone] (SKIN).     I hereby acknowledge that I have reviewed and verified the site with my Dermatologist and have requested and authorized my Dermatologist to proceed with the procedure.            Scribe Attestation      I,:  Cheri Talavera " am acting as a scribe while in the presence of the attending physician.:       I,:  Teresa June MD personally performed the services described in this documentation    as scribed in my presence.:

## 2024-05-15 ENCOUNTER — HOSPITAL ENCOUNTER (OUTPATIENT)
Dept: RADIOLOGY | Facility: HOSPITAL | Age: 49
Discharge: HOME | End: 2024-05-15
Attending: STUDENT IN AN ORGANIZED HEALTH CARE EDUCATION/TRAINING PROGRAM
Payer: COMMERCIAL

## 2024-05-15 DIAGNOSIS — N63.11 BREAST LUMP ON RIGHT SIDE AT 10 O'CLOCK POSITION: ICD-10-CM

## 2024-05-15 PROCEDURE — 76642 ULTRASOUND BREAST LIMITED: CPT

## 2024-05-20 PROCEDURE — 88305 TISSUE EXAM BY PATHOLOGIST: CPT | Performed by: DERMATOLOGY

## 2024-05-20 NOTE — RESULT ENCOUNTER NOTE
Thomas- I reached carmelita so no need to call!!  Mohs- please schedule mohs for site A. Thanks!    DERMATOPATHOLOGY RESULT NOTE    Results reviewed by ordering physician.  Called patient to personally discuss results. Discussed results with patient.       Instructions for Clinical Derm Team:   (remember to route Result Note to appropriate staff):    Call patient and schedule for mohs    Result & Plan by Specimen:    Specimen A: malignant- BCC  Plan: MOHs      Specimen B: benign SK  Plan: monitor and reassured, benign      1 Result Note     Component   Case Report  Surgical Pathology Report                         Case: [id number]                                  Authorizing Provider:  Teresa June MD     Collected:           05/14/2024 1317              Ordering Location:     [facility]      Received:            05/14/2024 1317                                     [place]                                                                Pathologist:           Teresa June MD                                                      Specimens:   A) - Skin, Other, A: right conchal bowl                                                             B) - Skin, Other, B: left upper abdomen                                                  Final Diagnosis  A. Skin, right conchal bowl, shave biopsy:  BASAL CELL CARCINOMA (NODULAR AND PIGMENTED TYPES); extending to biopsy margins.       B. Skin, left upper abdomen, shave biopsy:  Seborrheic keratosis; extending to biopsy margins.   Electronically signed by Teresa June MD on 5/20/2024 at  8:57 AM

## 2024-05-20 NOTE — RESULT ENCOUNTER NOTE
Watson Guzman! I could not reach Ellie today. If she calls back or you have time to call her, recommend Mohs on site A and reassurance for the SK on site B. Thanks! I will call here Wednesday when I am back if you dont connect.

## 2024-05-23 ENCOUNTER — TELEPHONE (OUTPATIENT)
Dept: DERMATOLOGY | Facility: CLINIC | Age: 49
End: 2024-05-23

## 2024-05-23 NOTE — TELEPHONE ENCOUNTER
Pre- operative Mohs Telephone Scheduling Note    Do you have a pacemaker, defibrillator, spinal or brain stimulator? no    Do you take antibiotics before skin or dental procedures? no  If yes, will likely require pre-operative antibiotics. Ask  the patient why they take the antibiotics (usually because of joint replacement).    Do you have a history of a joint replacements within the past 2 years? no   If yes, will likely require pre-operative antibiotics. Ask if orthopaedic surgeon has prescribed pre-operative antibiotics to take before procedures/dental work?    Do you take any OTC medications that thin your blood (Aspirin, Aleve, Ibuprofen) or supplements that thin your blood (fish oil, garlic, vitamin E, Ginko Biloba)? no    Do you take any prescribed medications that thin your blood (Coumadin, Plavix, Xarelto, Eliquis or another prescribed blood thinner)? no    Do you have an allergy to lidocaine or epinephrine? no    Do you have allergies to Iodine? no    Do you wear a lidocaine patch? no    Have you ever been diagnosed with HIV, AIDS, Hep B and Hep C? no    Do you use a cane, walker or wheelchair? no    Is the patient from a nursing home? no If yes, Is there any special accommodations that is needed for patient n/a    Do you smoke? no      If yes,  patient to try and stop 2 days before surgery and 7 days after the surgery. Minimizing smoking as much as possible during this time will improve healing and the cosmetic result after surgery.    Do you use supplemental oxygen? If so, how many liters and can you be off it for a short period of time? N/a    Date scheduled: 7/8/23 @ 9 am with Dr. Gagnon    Coordination of Care with other provider (Oculoplastics, Plastics, ENT) required? no   IF YES, PLEASE FORWARD TO APPROPRIATE PERSONNEL TO HELP COORDINATE.    Are there remaining tumors to be scheduled? no    Was Prior Authorization obtained? No added to excel spreadsheet (please use .[email or web address] to  document prior auth)    Patient preferred MyChart letter of preop instructions as opposed to snail mail.

## 2024-05-23 NOTE — LETTER
Ellie Cross     1975    [address] [place] [address]    Dear Ellie Cross,    You are scheduled to have the MOHS procedure on 7/8/24 at 9 am for right conchal bowl with Dr.Ryan Gagnon. Our [facility] located [facility] our address is [address] [place] [address]. Once you arrive please check in with our front staff in [address] and they will escort you to the MOHS waiting room.  If you have someone bringing you to your appointment they may wait in the waiting room or accompany you in your visit.      Below you will find some pre-op instructions along with some information regarding the MOHS procedure.     If you have any questions please call our office at [phone].       Thank you,    [facility]         PRE-OPERATIVE INSTRUCTIONS - MOHS    Before your scheduled surgery, there are a number of important precautions and positive steps you should take to help prepare yourself for a successful treatment and speedy recovery.    Some of the steps, which are listed below, may seem unnecessary and inconvenient, but they are important. For example, when you stop smoking, you increase your ability to heal. Occasionally, there may be valid reasons, personal or medical, why you can't comply. In such cases, please call the office so we can discuss possible ways to overcome any obstacles you may be encountering.    If you have any questions about the surgery, or remember additional medical information that you forgot to mention to our staff, please contact the office prior to your surgery.    GENERAL INFORMATION REGARDING MOHS MICROGRAPHIC SURGERY    Mohs surgery is a specialized technique for the removal of skin cancer developed by Dr. Frederick Mohs over 50 years ago to improve the cure rates of skin cancer. Traditionally, skin cancers are treated by destructive methods (radiation, freezing, scraping, and burning)  or excision (cutting out the tissue with standards margins and sending it to an outside laboratory for testing). These methods all yield cure rates between 65%-94%. However, for cancers located in cosmetically sensitive areas, large tumors, or tumors unsuccessfully treated by other means, Mohs surgery offers a higher cure rate. In most cases, Mohs surgery provides you with a 99% cure rate for primary (previously untreated) basal cell cancer and a 95% cure rate for primary squamous cell cancer. In Mohs surgery, tissue is removed and processed in a way that we are able to check 100% of the margins, giving the highest cure rate for any method of treating skin cancers while providing maximal preservation of normal skin. This allows the surgeon to produce an optimal cosmetic result for the patient by maximizing the amount of tissue removed yielding as small a scar as possible    On the day of surgery, you will be given local anesthesia only (similar to what was given to you during your initial biopsy). You will remain awake. You will verify the location of the skin cancer prior to the onset of the surgery. Once the area is numb, the tissue containing the skin cancer will be removed, taking a small safety margin. This margin is usually smaller than what would be taken with a standard excision. Once the tissue is removed, it is marked and oriented. The first layer (“Stage I”) will be processed in our laboratory. The wound will be treated for bleeding and a bandage will be placed to keep you comfortable while you wait an approximate 45 minutes-1 hour (for the processing of the tissue) in your room. Your Mohs surgeon will examine the pathology in the lab, checking all the margins. If any tumor remains, you will need to take a second layer of skin (“Stage 2”). The area will be re-anesthetized and your Mohs surgeon will remove more skin only in the area where the tumor exists. This process will continue until all the skin  cancer is removed. Unfortunately, there is no method to predict how many layers or stages will be taken.    Once the tumor has been removed completely, we will discuss the best ways to close the defect. Most wounds may be closed with stitches. A larger wound may require a skin graft or a flap. In rare instances, especially for cancers around the eye or for larger cancers, we may work with another surgeon (oculoplastic, ENT, plastics) with special skills to assist with reconstruction.  If the surgery is coordinated with another specialist, you will have the Mohs portion of the procedure first and see the coordinated specialist after the skin cancer has been removed.  Always follow the pre-operative instructions of the surgeon doing the closure.      Medications: Please take all your normal medications the morning of your surgery. If you are a diabetic, please bring your insulin or medications with you, as well as a snack to avoid having low blood sugar during your day with us.    1) Blood Thinners    VERY IMPORTANT: We do NOT stop or hold prescribed blood thinners (such as Coumadin/Warfarin, Plavix, Eliquis, Pradaxa, Brilinta, Apixaban, Xarelto, Lovonox, Rivaroxaban, or Aggrenox) before Mohs surgery. Additionally, If you take aspirin because you have had a stroke, heart attack, heart disease, other condition, or your physician has prescribed you to take it, please continue your aspirin.    Although there is a risk of increased bruising and bleeding, we are still able to safely perform surgery while continuing these medications. Please NEVER stop your prescribed blood thinner without the managing doctor's (the doctor that prescribed the medication) permission or knowledge. If you have any concerns about not holding your blood thinner, please address these with your Mohs surgeon.    Most people should stop all non-steroidal anti-inflammatory medications (Motrin, Naproxen, Advil, Midol, Aleve, etc.) for 7 days prior to  your scheduled surgery and 2 days after (unless instructed otherwise after surgery).  You may take Tylenol for pain.     Vitamins and Supplements  Avoid taking any supplements with Vitamin E, Fish Oil, Gingko, Ginseng, and Garlic for 2 weeks before and 2 days after your surgery. These thin your blood.    Lidocaine Patches  Avoid wearing any over the counter or prescribed Lidocaine patches the day of the surgery.    Alcohol: Avoid drinking alcohol for 2 days prior to your surgery, and for 2 days afterwards (it thins the blood and causes more bruising and swelling).    Smoking: Try to STOP or reduce smoking significantly the week before your surgery, and especially the week afterwards (it greatly improves how well you heal). Tobacco smoke deprives the blood of oxygen, which is urgently needed by the wound during the healing process.    Contact Lenses: Do not wear them on the day of the surgery. Instead, wear glasses and bring your case, in case we need to remove them.    Clothing: Do not wear your nicest clothing on your surgery day. We recommend wearing a button down shirt that will not disrupt your post-operative dressing when changing later that night. Please avoid wearing jeans during the procedure to help prevent damage to our equipment.     Bathing: On the morning of your surgery, you may bathe or shower normally. If you get your hair done on a weekly basis, remember to get your hair washed the day before surgery.   - You will need to keep your surgical site dry for a minimum of 48 hours after surgery.    Makeup: If your surgery is on the face, please do not wear any makeup on the day of the surgery.    Jewelry: Please try to avoid wearing jewelry on the day of surgery.    Food: On the morning of surgery, have breakfast but limit your intake of caffeinated beverages. They are diuretic and may inconvenience you during surgery. If you are following up with another surgeon the same day as your Mohs surgery, you  must receive permission to eat breakfast from that surgeon.      What to bring with you on the day of your surgery:  Bring snacks - Since you could be at the office long, you may bring snacks and/or lunch with you. Some snacks and drinks are available at the office as well.   Bring a sweater - Bring a sweater or jacket that buttons or zips down the front and will not disturb your wound dressing during removal.  Bring something to do - You will be spending much of the day in our office. There will be 45-60 minute waiting periods  between layers/stages, and while there is a television with cable in every room, it is nice to have something to keep you occupied such as books, magazines, knitting, music, or work.     Planning Ahead:  Other Appointments - It is important to realize that no matter how small the skin cancer appears to be, looks can be deceiving. Since your surgery may last the entire day, you should not schedule any other appointments that day.  Special Occasions - Surgery often creates swelling and bruising. Also, the post-op dressing may be rather large and obvious. Keep this in mind as you arrange your social/work schedule. If an important event is already planned, please check with your referring physician or your Mohs surgeon to see if the surgery can be postponed.  Activity Limits after Surgery - If surgery was performed on your face, we recommend that you keep your activity level to a minimum for 2-3 days (the blood pressure elevation related to exercise can lead to bleeding). If you have stitches in an area that will be under tension or significant movement (neck, back, arms, legs), you will need to avoid heavy lifting (anything over 5 lbs) or exercise for at least 2 weeks and possibly longer. We also advise that you limit out of town travel for the first 7 days after surgery. You should also wait at least 7 days before going into a pool or the ocean.  Housework - Since you will need to minimize  activity after surgery, plan to do your groceries, laundry, gardening, and other heavy household chores prior to your surgery. Please make arrangements for assistance during the post-op period. If surgery is around your mouth area, you may need to eat soft foods, such as soup, milkshakes, or yogurt for 48 hours.    Purchasing bandage supplies: Prior to surgery, please purchase the following items to care for your surgical wound properly.  Cotton swabs (Q-tips)  Vaseline or Aquaphor  Telfa pads (or any non-stick dressing)  Paper tape or Hypafix tape  Gauze pads (3x3)    Transportation: It is often reassuring and comforting to have a  drive you to and from the surgery. He or she is welcome to wait in the office during the surgery. If you do not have a , you may drive to and from your procedure (unless stated otherwise). If the site being treated is near your eye, be aware that the final bandage may cause some obstruction of vision.     Rescheduling: If you need to reschedule your surgery, please notify the office as soon as possible.

## 2024-05-23 NOTE — LETTER
Ellie Cross     1975    [address] [place] [address]    Dear Ellie Cross,    You are scheduled to have the MOHS procedure on 7/8/24 at 9 am for left conchal bowl with Dr.Ryan Gagnon. [facility] located [facility] our address is [address] [place] [address]. Once you arrive please check in with our front staff in [address] and they will escort you to the MOHS waiting room.  If you have someone bringing you to your appointment they may wait in the waiting room or accompany you in your visit.      Below you will find some pre-op instructions along with some information regarding the MOHS procedure.     If you have any questions please call our office at [phone].       Thank you,    [facility]         PRE-OPERATIVE INSTRUCTIONS - MOHS    Before your scheduled surgery, there are a number of important precautions and positive steps you should take to help prepare yourself for a successful treatment and speedy recovery.    Some of the steps, which are listed below, may seem unnecessary and inconvenient, but they are important. For example, when you stop smoking, you increase your ability to heal. Occasionally, there may be valid reasons, personal or medical, why you can't comply. In such cases, please call the office so we can discuss possible ways to overcome any obstacles you may be encountering.    If you have any questions about the surgery, or remember additional medical information that you forgot to mention to our staff, please contact the office prior to your surgery.    GENERAL INFORMATION REGARDING MOHS MICROGRAPHIC SURGERY    Mohs surgery is a specialized technique for the removal of skin cancer developed by Dr. Frederick Mohs over 50 years ago to improve the cure rates of skin cancer. Traditionally, skin cancers are treated by destructive methods (radiation, freezing, scraping, and burning)  or excision (cutting out the tissue with standards margins and sending it to an outside laboratory for testing). These methods all yield cure rates between 65%-94%. However, for cancers located in cosmetically sensitive areas, large tumors, or tumors unsuccessfully treated by other means, Mohs surgery offers a higher cure rate. In most cases, Mohs surgery provides you with a 99% cure rate for primary (previously untreated) basal cell cancer and a 95% cure rate for primary squamous cell cancer. In Mohs surgery, tissue is removed and processed in a way that we are able to check 100% of the margins, giving the highest cure rate for any method of treating skin cancers while providing maximal preservation of normal skin. This allows the surgeon to produce an optimal cosmetic result for the patient by maximizing the amount of tissue removed yielding as small a scar as possible    On the day of surgery, you will be given local anesthesia only (similar to what was given to you during your initial biopsy). You will remain awake. You will verify the location of the skin cancer prior to the onset of the surgery. Once the area is numb, the tissue containing the skin cancer will be removed, taking a small safety margin. This margin is usually smaller than what would be taken with a standard excision. Once the tissue is removed, it is marked and oriented. The first layer (“Stage I”) will be processed in our laboratory. The wound will be treated for bleeding and a bandage will be placed to keep you comfortable while you wait an approximate 45 minutes-1 hour (for the processing of the tissue) in your room. Your Mohs surgeon will examine the pathology in the lab, checking all the margins. If any tumor remains, you will need to take a second layer of skin (“Stage 2”). The area will be re-anesthetized and your Mohs surgeon will remove more skin only in the area where the tumor exists. This process will continue until all the skin  cancer is removed. Unfortunately, there is no method to predict how many layers or stages will be taken.    Once the tumor has been removed completely, we will discuss the best ways to close the defect. Most wounds may be closed with stitches. A larger wound may require a skin graft or a flap. In rare instances, especially for cancers around the eye or for larger cancers, we may work with another surgeon (oculoplastic, ENT, plastics) with special skills to assist with reconstruction.  If the surgery is coordinated with another specialist, you will have the Mohs portion of the procedure first and see the coordinated specialist after the skin cancer has been removed.  Always follow the pre-operative instructions of the surgeon doing the closure.      Medications: Please take all your normal medications the morning of your surgery. If you are a diabetic, please bring your insulin or medications with you, as well as a snack to avoid having low blood sugar during your day with us.    1) Blood Thinners    VERY IMPORTANT: We do NOT stop or hold prescribed blood thinners (such as Coumadin/Warfarin, Plavix, Eliquis, Pradaxa, Brilinta, Apixaban, Xarelto, Lovonox, Rivaroxaban, or Aggrenox) before Mohs surgery. Additionally, If you take aspirin because you have had a stroke, heart attack, heart disease, other condition, or your physician has prescribed you to take it, please continue your aspirin.    Although there is a risk of increased bruising and bleeding, we are still able to safely perform surgery while continuing these medications. Please NEVER stop your prescribed blood thinner without the managing doctor's (the doctor that prescribed the medication) permission or knowledge. If you have any concerns about not holding your blood thinner, please address these with your Mohs surgeon.    Most people should stop all non-steroidal anti-inflammatory medications (Motrin, Naproxen, Advil, Midol, Aleve, etc.) for 7 days prior to  your scheduled surgery and 2 days after (unless instructed otherwise after surgery).  You may take Tylenol for pain.     Vitamins and Supplements  Avoid taking any supplements with Vitamin E, Fish Oil, Gingko, Ginseng, and Garlic for 2 weeks before and 2 days after your surgery. These thin your blood.    Lidocaine Patches  Avoid wearing any over the counter or prescribed Lidocaine patches the day of the surgery.    Alcohol: Avoid drinking alcohol for 2 days prior to your surgery, and for 2 days afterwards (it thins the blood and causes more bruising and swelling).    Smoking: Try to STOP or reduce smoking significantly the week before your surgery, and especially the week afterwards (it greatly improves how well you heal). Tobacco smoke deprives the blood of oxygen, which is urgently needed by the wound during the healing process.    Contact Lenses: Do not wear them on the day of the surgery. Instead, wear glasses and bring your case, in case we need to remove them.    Clothing: Do not wear your nicest clothing on your surgery day. We recommend wearing a button down shirt that will not disrupt your post-operative dressing when changing later that night. Please avoid wearing jeans during the procedure to help prevent damage to our equipment.     Bathing: On the morning of your surgery, you may bathe or shower normally. If you get your hair done on a weekly basis, remember to get your hair washed the day before surgery.   - You will need to keep your surgical site dry for a minimum of 48 hours after surgery.    Makeup: If your surgery is on the face, please do not wear any makeup on the day of the surgery.    Jewelry: Please try to avoid wearing jewelry on the day of surgery.    Food: On the morning of surgery, have breakfast but limit your intake of caffeinated beverages. They are diuretic and may inconvenience you during surgery. If you are following up with another surgeon the same day as your Mohs surgery, you  must receive permission to eat breakfast from that surgeon.      What to bring with you on the day of your surgery:  Bring snacks - Since you could be at the office long, you may bring snacks and/or lunch with you. Some snacks and drinks are available at the office as well.   Bring a sweater - Bring a sweater or jacket that buttons or zips down the front and will not disturb your wound dressing during removal.  Bring something to do - You will be spending much of the day in our office. There will be 45-60 minute waiting periods  between layers/stages, and while there is a television with cable in every room, it is nice to have something to keep you occupied such as books, magazines, knitting, music, or work.     Planning Ahead:  Other Appointments - It is important to realize that no matter how small the skin cancer appears to be, looks can be deceiving. Since your surgery may last the entire day, you should not schedule any other appointments that day.  Special Occasions - Surgery often creates swelling and bruising. Also, the post-op dressing may be rather large and obvious. Keep this in mind as you arrange your social/work schedule. If an important event is already planned, please check with your referring physician or your Mohs surgeon to see if the surgery can be postponed.  Activity Limits after Surgery - If surgery was performed on your face, we recommend that you keep your activity level to a minimum for 2-3 days (the blood pressure elevation related to exercise can lead to bleeding). If you have stitches in an area that will be under tension or significant movement (neck, back, arms, legs), you will need to avoid heavy lifting (anything over 5 lbs) or exercise for at least 2 weeks and possibly longer. We also advise that you limit out of town travel for the first 7 days after surgery. You should also wait at least 7 days before going into a pool or the ocean.  Housework - Since you will need to minimize  activity after surgery, plan to do your groceries, laundry, gardening, and other heavy household chores prior to your surgery. Please make arrangements for assistance during the post-op period. If surgery is around your mouth area, you may need to eat soft foods, such as soup, milkshakes, or yogurt for 48 hours.    Purchasing bandage supplies: Prior to surgery, please purchase the following items to care for your surgical wound properly.  Cotton swabs (Q-tips)  Vaseline or Aquaphor  Telfa pads (or any non-stick dressing)  Paper tape or Hypafix tape  Gauze pads (3x3)    Transportation: It is often reassuring and comforting to have a  drive you to and from the surgery. He or she is welcome to wait in the office during the surgery. If you do not have a , you may drive to and from your procedure (unless stated otherwise). If the site being treated is near your eye, be aware that the final bandage may cause some obstruction of vision.     Rescheduling: If you need to reschedule your surgery, please notify the office as soon as possible.

## 2024-06-12 ENCOUNTER — PATIENT MESSAGE (OUTPATIENT)
Dept: DERMATOLOGY | Facility: CLINIC | Age: 49
End: 2024-06-12

## 2024-06-12 ENCOUNTER — APPOINTMENT (OUTPATIENT)
Dept: LAB | Facility: CLINIC | Age: 49
End: 2024-06-12
Payer: COMMERCIAL

## 2024-06-12 DIAGNOSIS — R20.0 NUMBNESS AND TINGLING IN BOTH HANDS: ICD-10-CM

## 2024-06-12 DIAGNOSIS — E53.8 B12 DEFICIENCY: ICD-10-CM

## 2024-06-12 DIAGNOSIS — Z00.8 OTHER SPECIFIED GENERAL MEDICAL EXAMINATION: ICD-10-CM

## 2024-06-12 DIAGNOSIS — R20.2 NUMBNESS AND TINGLING IN BOTH HANDS: ICD-10-CM

## 2024-06-12 DIAGNOSIS — E55.9 VITAMIN D DEFICIENCY: ICD-10-CM

## 2024-06-12 DIAGNOSIS — L30.4 INTERTRIGO: Primary | ICD-10-CM

## 2024-06-12 DIAGNOSIS — N95.1 PERIMENOPAUSAL SYMPTOMS: Primary | ICD-10-CM

## 2024-06-12 LAB
CHOLEST SERPL-MCNC: 228 MG/DL
EST. AVERAGE GLUCOSE BLD GHB EST-MCNC: 111 MG/DL
HBA1C MFR BLD: 5.5 %
HDLC SERPL-MCNC: 47 MG/DL
LDLC SERPL CALC-MCNC: 113 MG/DL (ref 0–100)
NONHDLC SERPL-MCNC: 181 MG/DL
T4 FREE SERPL-MCNC: 1.37 NG/DL (ref 0.61–1.12)
TRIGL SERPL-MCNC: 341 MG/DL
TSH SERPL DL<=0.05 MIU/L-ACNC: <0.01 UIU/ML (ref 0.45–4.5)

## 2024-06-12 PROCEDURE — 80061 LIPID PANEL: CPT

## 2024-06-12 PROCEDURE — 84439 ASSAY OF FREE THYROXINE: CPT

## 2024-06-12 PROCEDURE — 84443 ASSAY THYROID STIM HORMONE: CPT

## 2024-06-12 PROCEDURE — 36415 COLL VENOUS BLD VENIPUNCTURE: CPT

## 2024-06-12 PROCEDURE — 83036 HEMOGLOBIN GLYCOSYLATED A1C: CPT

## 2024-06-12 RX ORDER — PROGESTERONE 200 MG/1
200 CAPSULE ORAL
Qty: 90 CAPSULE | Refills: 3 | Status: SHIPPED | OUTPATIENT
Start: 2024-06-12

## 2024-06-13 DIAGNOSIS — E55.9 VITAMIN D DEFICIENCY: ICD-10-CM

## 2024-06-13 RX ORDER — ERGOCALCIFEROL 1.25 MG/1
50000 CAPSULE ORAL WEEKLY
Qty: 12 CAPSULE | Refills: 0 | Status: SHIPPED | OUTPATIENT
Start: 2024-06-13

## 2024-06-13 RX ORDER — TRIAMCINOLONE ACETONIDE 1 MG/G
CREAM TOPICAL
Qty: 45 G | Refills: 2 | Status: SHIPPED | OUTPATIENT
Start: 2024-06-13

## 2024-06-13 RX ORDER — NYSTATIN 100000 U/G
CREAM TOPICAL 2 TIMES DAILY
Qty: 30 G | Refills: 5 | Status: SHIPPED | OUTPATIENT
Start: 2024-06-13

## 2024-06-13 NOTE — TELEPHONE ENCOUNTER
Refill must be reviewed and completed by the office or provider. The refill is unable to be approved or denied by the medication management team.      Vitamin D 50,000 - Please review to see if the refill is appropriate.

## 2024-06-14 DIAGNOSIS — E03.9 ACQUIRED HYPOTHYROIDISM: Primary | ICD-10-CM

## 2024-06-14 RX ORDER — LEVOTHYROXINE SODIUM 0.1 MG/1
100 TABLET ORAL DAILY
Qty: 90 TABLET | Refills: 3 | Status: SHIPPED | OUTPATIENT
Start: 2024-06-14

## 2024-06-18 ENCOUNTER — TELEPHONE (OUTPATIENT)
Dept: FAMILY MEDICINE CLINIC | Facility: CLINIC | Age: 49
End: 2024-06-18

## 2024-06-18 ENCOUNTER — CLINICAL SUPPORT (OUTPATIENT)
Dept: FAMILY MEDICINE CLINIC | Facility: CLINIC | Age: 49
End: 2024-06-18
Payer: COMMERCIAL

## 2024-06-18 DIAGNOSIS — T75.3XXD MOTION SICKNESS, SUBSEQUENT ENCOUNTER: Primary | ICD-10-CM

## 2024-06-18 DIAGNOSIS — E53.8 B12 DEFICIENCY: Primary | ICD-10-CM

## 2024-06-18 PROCEDURE — 96372 THER/PROPH/DIAG INJ SC/IM: CPT

## 2024-06-18 RX ORDER — SCOLOPAMINE TRANSDERMAL SYSTEM 1 MG/1
1 PATCH, EXTENDED RELEASE TRANSDERMAL
Qty: 10 PATCH | Refills: 0 | Status: SHIPPED | OUTPATIENT
Start: 2024-06-18

## 2024-06-18 RX ADMIN — CYANOCOBALAMIN 1000 MCG: 1000 INJECTION, SOLUTION INTRAMUSCULAR; SUBCUTANEOUS at 11:43

## 2024-06-18 NOTE — TELEPHONE ENCOUNTER
Patient was in for her B 12 injection and requested a refill for scopolamine 1 mg patch for 7 days as she is leaving for a cruise this weekend. Medication is on med history.

## 2024-06-18 NOTE — PROGRESS NOTES
Assessment/Plan:      Diagnoses and all orders for this visit:    B12 deficiency          Subjective:     Patient ID: Ellie Cross is a 48 y.o. female.          Objective:      There were no vitals taken for this visit.

## 2024-06-23 DIAGNOSIS — I89.0 LYMPHEDEMA: ICD-10-CM

## 2024-06-23 DIAGNOSIS — M79.7 FIBROMYALGIA: Primary | ICD-10-CM

## 2024-06-23 DIAGNOSIS — M25.561 RIGHT KNEE PAIN, UNSPECIFIED CHRONICITY: ICD-10-CM

## 2024-07-08 ENCOUNTER — PROCEDURE VISIT (OUTPATIENT)
Dept: DERMATOLOGY | Facility: CLINIC | Age: 49
End: 2024-07-08
Payer: COMMERCIAL

## 2024-07-08 VITALS
OXYGEN SATURATION: 99 % | TEMPERATURE: 98.9 F | DIASTOLIC BLOOD PRESSURE: 82 MMHG | WEIGHT: 214.2 LBS | HEIGHT: 68 IN | SYSTOLIC BLOOD PRESSURE: 130 MMHG | BODY MASS INDEX: 32.46 KG/M2 | HEART RATE: 88 BPM

## 2024-07-08 DIAGNOSIS — C44.212: ICD-10-CM

## 2024-07-08 PROCEDURE — 17311 MOHS 1 STAGE H/N/HF/G: CPT | Performed by: DERMATOLOGY

## 2024-07-08 NOTE — PROGRESS NOTES
MOHS Procedure Note    Patient: Ellie Cross  : 1975  MRN: [id number]  Date: 2024    History of Present Illness: The patient is a 48 y.o. female who presents with complaints of Basal Cell Carcinoma on the Right Conchal Bowl.    Past Medical History:   Diagnosis Date    Abnormal Pap smear of cervix     Allergic     Arthritis     Asthma     Basal cell carcinoma 2024    Right Conchal bowl; MOHS    Breast lump     resolved [id number]    Depression     Disease of thyroid gland     Hair loss     Hemorrhage of conjunctiva, left     last assessed [id number]    Knee joint pain     right    Lyme disease     lastt assessed [id number]    Migraine     Thyroid disease        Past Surgical History:   Procedure Laterality Date    LUMBAR FUSION  2004    Lumbar Vertebral Fusion;      MOHS SURGERY Right 2024    BCC Right Conchal Bowl; Dr. Gagnon    AK HYSTEROSCOPY BX ENDOMETRIUM&/POLYPC W/WO D&C N/A 2022    Procedure: DILATATION AND CURETTAGE (D&C) WITH HYSTEROSCOPY;  Surgeon: Jenny Montoya MD;  Location: [facility];  Service: Gynecology    AK HYSTEROSCOPY ENDOMETRIAL ABLATION N/A 2022    Procedure: ABLATION ENDOMETRIAL FREDDY;  Surgeon: Jenny Montoya MD;  Location: [facility];  Service: Gynecology    AK LAPAROSCOPY W/RMVL ADNEXAL STRUCTURES Bilateral 2022    Procedure: SALPINGECTOMY, LAPAROSCOPIC;  Surgeon: Jenny Montoya MD;  Location: [facility];  Service: Gynecology    SPINE SURGERY      Spinal fusion    WISDOM TOOTH EXTRACTION           Current Outpatient Medications:     acetaminophen (TYLENOL) 500 mg tablet, Take 2 tablets (1,000 mg total) by mouth every 6 (six) hours as needed for mild pain or moderate pain, Disp: 30 tablet, Rfl: 0    albuterol (ProAir HFA) 90 mcg/act inhaler, Inhale 2 puffs every 6 (six) hours as needed for wheezing, Disp: 8.5 g, Rfl: 0    ALPRAZolam (XANAX) 0.5 mg tablet, Take 1 tablet (0.5 mg total) by mouth daily as  needed for anxiety, Disp: 30 tablet, Rfl: 3    Diclofenac Sodium (VOLTAREN) 1 %, Apply 2 g topically 3 (three) times a day as needed (for left knee pain), Disp: 50 g, Rfl: 1    ergocalciferol (VITAMIN D2) 50,000 units, Take 1 capsule (50,000 Units total) by mouth once a week, Disp: 12 capsule, Rfl: 0    ibuprofen (MOTRIN) 600 mg tablet, Take 1 tablet (600 mg total) by mouth every 6 (six) hours as needed for mild pain or moderate pain, Disp: 30 tablet, Rfl: 0    levothyroxine (Synthroid) 100 mcg tablet, Take 1 tablet (100 mcg total) by mouth daily, Disp: 90 tablet, Rfl: 3    liothyronine (CYTOMEL) 5 mcg tablet, Take 1 tablet (5 mcg total) by mouth daily, Disp: 30 tablet, Rfl: 0    meclizine (ANTIVERT) 12.5 MG tablet, Take 1 tablet (12.5 mg total) by mouth 3 (three) times a day as needed for dizziness, Disp: 30 tablet, Rfl: 0    nystatin (MYCOSTATIN) cream, Apply topically 2 (two) times a day, Disp: 30 g, Rfl: 5    nystatin (MYCOSTATIN) powder, Apply topically 3 (three) times a day, Disp: 60 g, Rfl: 1    Progesterone 200 MG CAPS, Take 200 mg by mouth at bedtime, Disp: 90 capsule, Rfl: 3    scopolamine (TRANSDERM-SCOP) 1 mg/3 days TD 72 hr patch, Place 1 patch on the skin over 72 hours every third day, Disp: 10 patch, Rfl: 0    triamcinolone (KENALOG) 0.1 % cream, Apply BID for up to 2 weeks to affected skin on neck down prn flares then take one week break and can repeat regimen prn flares. Do not apply to groin, other skin folds, face., Disp: 45 g, Rfl: 2    ZOLMitriptan (ZOMIG) 5 MG tablet, Take 1 tablet (5 mg total) by mouth once as needed for migraine for up to 1 dose, Disp: 6 tablet, Rfl: 0    valACYclovir (VALTREX) 1,000 mg tablet, Take 1 tablet (1,000 mg total) by mouth 2 (two) times a day for 5 days (Patient not taking: Reported on 8/10/2023), Disp: 10 tablet, Rfl: 5    Current Facility-Administered Medications:     cyanocobalamin injection 1,000 mcg, 1,000 mcg, Intramuscular, Q30 Days, Mona Coley DO, 1,000  mcg at 06/18/24 1143    Allergies   Allergen Reactions    Pollen Extract        Physical Exam:   Vitals:    07/08/24 0900   BP: 130/82   Pulse: 88   Temp: 98.9 °F (37.2 °C)   SpO2: 99%     General: Awake, Alert, Oriented x 3, Mood and affect appropriate  Respiratory: Respirations even and unlabored  Cardiovascular: Peripheral pulses intact; no edema  Musculoskeletal Exam: N/A    Assessment: 0.5 cm x 0.6 cm pink macule in location of prior bx. Biopsy proven to be a Basal Cell Carcinoma, nodular and pigmented type, on the right conchal bowl.    Plan: MOHS    Time of H&P Completion:0907    MOHS Procedure Timeout      Flowsheet Row Most Recent Value   Timeout: 0919   Patient Identity Verified: Yes   Correct Site Verified: Yes   Correct Procedure Verified: Yes            MOHS Diagnosis/Indication/Location/ID      Flowsheet Row Most Recent Value   Pathology Type Basal cell carcinoma   Anatomic Site right conchal bowl or calista of helix   Indications for MOHS tumor location   MOHS ID [id number]            MOHS Site/Accession/Pre-Post      Flowsheet Row Most Recent Value   Original Site Identified (as submitted by referring clinician) Photo, Note   Biopsy Accession/Specimen # (as submitted by referring clincian) [id number]   Pre-MOHS Size Length (cm) 0.5   Pre-MOHS Size Width (cm) 0.6   Post-MOHS Size-Length (cm) 0.7   Post MOHS Size-Width (cm) 0.6   Repair Type Second intention   Anesthetic Used 1% Lidocaine with epinephrine            MOHS Tumor Stage 1 Information      Flowsheet Row Most Recent Value   Tissue Sections (blocks) 2   Microscopic Exam Section 1: No tumor identified in section.   Microscopic Exam Section 2: No tumor identified in section.   Tumor Clear After Stage I? Yes                Patient identified, procedure verified, site identified and verified. Time out completed. Surgical removal of the lesion discussed with the patient (risks and benefits, including possibility of scarring, infection, recurrence or  potential for further treatment)  I have specifically identified the site with the patient. I have discussed the fact that the patient will have a scar after the procedure regardless of granulation or repair with sutures. I have discussed that the repair options can range from granulation in some cases to linear or curvilinear closures to larger flaps or grafts.  There are sometimes flaps or grafts used that require multiples stages of surgery and will not be completed today, rather be completed over a series of appointments. I have discussed that occasionally due to location, size or depth of the lesion I may recommend consultation with and transfer of care for further removal or the reconstruction to another provider such as ophthalmology surgery, plastic surgery, ENT surgery, or surgical oncology. There are cases in which other testing such as imaging with MRI or CT scan or testing of lymph nodes is recommended because of the nature/depth/location of tumor seen during the removal. There is a risk of injury to nerves causing temporary or permanent numbness or the inability to move muscles full such as the inability to lift eyebrows. Questions answered and verbal and written consent was obtained.    The tumor qualifies for Mohs based on AUC criteria. Dr. Gagnon served as the surgeon and pathologist during the procedure.    BCC cleared with 1 stage of mohs and allowed to heal secondarily given location.  Well tolerated.  Wound check prn.    Scribe Attestation      I,:  Jessica Senior MA am acting as a scribe while in the presence of the attending physician.:       I,:  Vidal Gagnon MD personally performed the services described in this documentation    as scribed in my presence.:

## 2024-07-08 NOTE — PATIENT INSTRUCTIONS
"Mohs Microscopic Surgery After Care    Your wound will heal via secondary intention, which means no additional surgery was performed after removal of your skin cancer. The wound was left open to heal gradually over time using wound care alone. We did put a layer of glue on the wound to help make wound care easier as well as help with decreasing the risk of bleeding after surgery.     Wounds left to heal secondarily can take 2-3 months on average to heal.  The healing occurs step by step. You will notice that over the first three weeks the area will drain straw colored fluid that may have some blood within it. This is normal. Over the first three weeks, you form a nice healing base called fibrin that serves as the scaffold or map for the rest of your body's healing process. The fibrin is a thick yellow tissue. People often worry that it is pus given the yellow color. Unlike pus, this is a thick layer that cannot be rubbed off. After this, you should expect the wound to \"fill in\" to the surface of your skin over the course of several weeks. Lastly, a new layer of skin will form over the wound.    Until your body forms a good fibrin base (which takes ~3 weeks) you should avoid immersing the wound in water (such as in baths, whirlpools, swimming pools, hot tubs, lakes and oceans). You however CAN and should wash the area daily, as instructed below.     After healing, the scar will initially be red (and often times a deep red to purple color on the legs) but will gradually fade over the course of 1 year to to round scar lighter than the skin surrounding it.    WOUND CARE AFTER YOUR PROCEDURE    Try NOT to remove the pressure bandage for 48 hours. Keep the area clean and dry while this bandage is on.     After removing the bandage for the first time, gently clean the area with soap and water. If the bandage is difficult to remove, getting the bandage wet in the shower will sometimes help soften the adhesive and allow it " to be removed more easily.     You will now need to cleanse this area daily in the shower with gentle soap. There is no need to scrub the area. There is no need for further wound care for 2 weeks or until the glue peels off entirely. You will notice over this time that the glue will start to flake off. Do not yet apply and Vaseline or Aquaphor to the area as this will dissolve your skin glue.  If you would like to keep the area covered, non stick dressing and paper tape (or Hypafix) are recommended for sensitive skin but a bandaid is fine if it covers the area well.    After 1-2 weeks, you will need start to apply plain Vaseline ointment (this is over the counter and not a prescription) to the site until it has healed over completely followed by a clean appropriately sized bandage to area.  Non stick dressing and paper tape (or Hypafix) are recommended for sensitive skin but a bandaid is fine if it covers the area well.      We advise you follow the wound care as noted below the entire time it takes for the areas to heal completely.      MANAGING YOUR PAIN AFTER SURGERY     You can expect to have some pain after surgery. This is normal. The pain is typically worse the first two days after surgery, and quickly begins to get better.     The best strategy for controlling your pain after surgery is around the clock pain control. You can take over the counter Acetaminophen (Tylenol) for discomfort, if no contraindications.     If you are taking this at the maximum dose, you can alternate this with Motrin (ibuprofen or Advil) as well. Alternating these medications with each other allows you to maximize your pain control. In addition to Tylenol and Motrin, you can use heating pads or ice packs on your incisions to help reduce your pain.     How will I alternate your regular strength over-the-counter pain medication?  You will take a dose of pain medication every three hours.   Start by taking 650 mg of Tylenol (2 pills of  325 mg)   3 hours later take 600 mg of Motrin (3 pills of 200 mg)   3 hours after taking the Motrin take 650 mg of Tylenol   3 hours after that take 600 mg of Motrin.    See example - if your first dose of Tylenol is at 12:00 PM     12:00 PM  Tylenol 650 mg (2 pills of 325 mg)    3:00 PM  Motrin 600 mg (3 pills of 200 mg)    6:00 PM  Tylenol 650 mg (2 pills of 325 mg)    9:00 PM  Motrin 600 mg (3 pills of 200 mg)    Continue alternating every 3 hours      Important:   Do not take more than 4000mg of Tylenol or 3200mg of Motrin in a 24-hour period.     What if I still have pain?   If you have pain that is not controlled with the over-the-counter pain medications (Tylenol and Motrin or Advil), don't hesitate to call our staff using the number provided. We will help make sure you are managing your pain in the best way possible, and if necessary, we can provide a prescription for additional pain medication.     CALL OUR OFFICE IMMEDIATELY FOR ANY SIGNS OF INFECTION:    This includes fever, chills, increased redness, warmth, tenderness, severe discomfort/pain, or pus or foul smell coming from the wound. If you are experiencing any of the above, please call [facility]'s Mohs Department directly at [phone].    IF BLEEDING IS NOTICED:    Place a clean cloth over the area and apply firm pressure for thirty minutes.  Check the wound ONLY after 30 minutes of direct pressure; do not cheat and sneak a peak, as that does not count.  If bleeding persists after 30 minutes of legitimate direct pressure, then try one more round of direct pressure to the area.  Should the bleeding become heavier or not stop after the second attempt, call [facility] directly at [phone]. Your call will get routed to the dermatology surgeon on call even after hours.

## 2024-07-31 ENCOUNTER — HOSPITAL ENCOUNTER (OUTPATIENT)
Dept: RADIOLOGY | Facility: HOSPITAL | Age: 49
Discharge: HOME | End: 2024-07-31
Attending: ORTHOPAEDIC SURGERY
Payer: COMMERCIAL

## 2024-07-31 ENCOUNTER — OFFICE VISIT (OUTPATIENT)
Dept: OBGYN CLINIC | Facility: HOSPITAL | Age: 49
End: 2024-07-31
Payer: COMMERCIAL

## 2024-07-31 VITALS
HEART RATE: 90 BPM | DIASTOLIC BLOOD PRESSURE: 82 MMHG | SYSTOLIC BLOOD PRESSURE: 113 MMHG | WEIGHT: 214.29 LBS | HEIGHT: 68 IN | BODY MASS INDEX: 32.48 KG/M2

## 2024-07-31 DIAGNOSIS — M54.12 CERVICAL RADICULOPATHY: ICD-10-CM

## 2024-07-31 DIAGNOSIS — R52 PAIN: ICD-10-CM

## 2024-07-31 DIAGNOSIS — R52 PAIN: Primary | ICD-10-CM

## 2024-07-31 DIAGNOSIS — M54.16 LUMBAR RADICULOPATHY: ICD-10-CM

## 2024-07-31 PROCEDURE — 99214 OFFICE O/P EST MOD 30 MIN: CPT | Performed by: ORTHOPAEDIC SURGERY

## 2024-07-31 PROCEDURE — 72050 X-RAY EXAM NECK SPINE 4/5VWS: CPT

## 2024-07-31 PROCEDURE — 72110 X-RAY EXAM L-2 SPINE 4/>VWS: CPT

## 2024-07-31 RX ORDER — MELOXICAM 15 MG/1
15 TABLET ORAL DAILY
Qty: 14 TABLET | Refills: 0 | Status: SHIPPED | OUTPATIENT
Start: 2024-07-31

## 2024-07-31 NOTE — PROGRESS NOTES
Assessment & Plan/Medical Decision Makin y.o. female with Back Pain and Neck Pain imaging findings most notable for lumbar spondylosis , cervical spondylosis         The clinical, physical and imaging findings were reviewed with the patient.  Ellie  has a constellation of findings consistent with Lumbar Radiculopathy in the setting of lumbar degenerative disease, previous L4-ALIF.  Also with Cervical Radiculopathy in the setting of cervical spondylosis with sign concerning for myelopathy.      We discussed the treatment options including physical therapy, at home exercises, activity modifications, chiropractic medicine, oral medications, interventional spine procedures.  At this time recommend continued conservative treatments and evaluation of neural elements with advanced imaging.    MRI cervical spine to further evaluate patient's symptoms. Will review MRI in detail with patient at follow-up visit and discuss further treatment options at that time.   MRI lumbar spine to further evaluate patient's symptoms. Will review MRI in detail with patient at follow-up visit and discuss further treatment options at that time.   Referral placed for physical therapy to work on core strengthening, lumbar ROM, strengthening, and stretching exercises.   Meloxicam rx sent to patient's pharmacy. Discussed reasoning for prescribing medication, proper usage, and potential side effects.     Patient instructed to return to office/ER sooner if symptoms are not improving, getting worse, or new worrisome/neurologic symptoms arise.  Patient will follow up after MRIs are complete for further treatment planning.      Subjective:      Chief Complaint: Back Pain    HPI:  Ellie Cross is a 48 y.o. female presenting for initial visit with chief complaint of back pain and bilateral leg pain.  Pain began years ago but has recently been worse.  Pain typically managed with rest, OTC, exercise.  Denies weakness.  Also notes neck pain and  bilateral arm pain with hand numbness.  Has noticed some dexterity issues.   Denies any get trauma. Denies fever or chills, no night sweats. Denies any bladder or bowel changes.      Conservative therapy includes the following:   Medications: Icy-hot, OTC    Injections: No     Physical Therapy: No  Chiropractic Medicine: has not attempted  Accupunture/Massage Therapy: has not attempted   These therapeutic modalities were ineffective at providing sustained pain relief/functional improvement.     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  Living situation: Lives alone  ADLs: patient is able to perform     Objective:     Family History   Problem Relation Age of Onset    Atrial fibrillation Mother     Hyperlipidemia Mother     Hypertension Mother     Supraventricular tachycardia Mother     Heart disease Mother     Diabetes Father             Hyperlipidemia Father     Hypertension Father             Arthritis Maternal Grandmother             Leukemia Maternal Grandmother     Cancer Maternal Grandmother         blood ca    Stroke Maternal Grandfather             Pancreatic cancer Maternal Grandfather 65    No Known Problems Sister     No Known Problems Paternal Grandmother     No Known Problems Paternal Grandfather     No Known Problems Sister     No Known Problems Paternal Aunt     No Known Problems Paternal Aunt     No Known Problems Paternal Aunt        Past Medical History:   Diagnosis Date    Abnormal Pap smear of cervix     Allergic     Arthritis     Asthma     Basal cell carcinoma 2024    Right Conchal bowl; MOHS    Breast lump     resolved [id number]    Depression     Disease of thyroid gland     Hair loss     Hemorrhage of conjunctiva, left     last assessed [id number]    Knee joint pain     right    Lyme disease     lastt assessed [id number]    Migraine     Thyroid disease        Current Outpatient Medications   Medication Sig Dispense Refill     acetaminophen (TYLENOL) 500 mg tablet Take 2 tablets (1,000 mg total) by mouth every 6 (six) hours as needed for mild pain or moderate pain 30 tablet 0    albuterol (ProAir HFA) 90 mcg/act inhaler Inhale 2 puffs every 6 (six) hours as needed for wheezing 8.5 g 0    ALPRAZolam (XANAX) 0.5 mg tablet Take 1 tablet (0.5 mg total) by mouth daily as needed for anxiety 30 tablet 3    Diclofenac Sodium (VOLTAREN) 1 % Apply 2 g topically 3 (three) times a day as needed (for left knee pain) 50 g 1    ergocalciferol (VITAMIN D2) 50,000 units Take 1 capsule (50,000 Units total) by mouth once a week 12 capsule 0    ibuprofen (MOTRIN) 600 mg tablet Take 1 tablet (600 mg total) by mouth every 6 (six) hours as needed for mild pain or moderate pain 30 tablet 0    levothyroxine (Synthroid) 100 mcg tablet Take 1 tablet (100 mcg total) by mouth daily 90 tablet 3    liothyronine (CYTOMEL) 5 mcg tablet Take 1 tablet (5 mcg total) by mouth daily 30 tablet 0    meclizine (ANTIVERT) 12.5 MG tablet Take 1 tablet (12.5 mg total) by mouth 3 (three) times a day as needed for dizziness 30 tablet 0    nystatin (MYCOSTATIN) cream Apply topically 2 (two) times a day 30 g 5    nystatin (MYCOSTATIN) powder Apply topically 3 (three) times a day 60 g 1    Progesterone 200 MG CAPS Take 200 mg by mouth at bedtime 90 capsule 3    scopolamine (TRANSDERM-SCOP) 1 mg/3 days TD 72 hr patch Place 1 patch on the skin over 72 hours every third day 10 patch 0    triamcinolone (KENALOG) 0.1 % cream Apply BID for up to 2 weeks to affected skin on neck down prn flares then take one week break and can repeat regimen prn flares. Do not apply to groin, other skin folds, face. 45 g 2    ZOLMitriptan (ZOMIG) 5 MG tablet Take 1 tablet (5 mg total) by mouth once as needed for migraine for up to 1 dose 6 tablet 0    valACYclovir (VALTREX) 1,000 mg tablet Take 1 tablet (1,000 mg total) by mouth 2 (two) times a day for 5 days (Patient not taking: Reported on 8/10/2023) 10  tablet 5     Current Facility-Administered Medications   Medication Dose Route Frequency Provider Last Rate Last Admin    cyanocobalamin injection 1,000 mcg  1,000 mcg Intramuscular Q30 Days Mona Coley DO   1,000 mcg at 06/18/24 1143       Past Surgical History:   Procedure Laterality Date    LUMBAR FUSION  01/01/2004    Lumbar Vertebral Fusion;  2004    MOHS SURGERY Right 07/08/2024    BCC Right Conchal Bowl; Dr. Gagnon    AL HYSTEROSCOPY BX ENDOMETRIUM&/POLYPC W/WO D&C N/A 02/16/2022    Procedure: DILATATION AND CURETTAGE (D&C) WITH HYSTEROSCOPY;  Surgeon: Jenny Montoya MD;  Location: [facility];  Service: Gynecology    AL HYSTEROSCOPY ENDOMETRIAL ABLATION N/A 02/16/2022    Procedure: ABLATION ENDOMETRIAL FREDDY;  Surgeon: Jenny Montoya MD;  Location: [facility];  Service: Gynecology    AL LAPAROSCOPY W/RMVL ADNEXAL STRUCTURES Bilateral 02/16/2022    Procedure: SALPINGECTOMY, LAPAROSCOPIC;  Surgeon: Jenny Montoya MD;  Location: [facility];  Service: Gynecology    SPINE SURGERY  2005    Spinal fusion    WISDOM TOOTH EXTRACTION         Social History     Socioeconomic History    Marital status:      Spouse name: Not on file    Number of children: Not on file    Years of education: Not on file    Highest education level: Not on file   Occupational History    Not on file   Tobacco Use    Smoking status: Never     Passive exposure: Never    Smokeless tobacco: Never   Vaping Use    Vaping status: Never Used   Substance and Sexual Activity    Alcohol use: Not Currently     Comment: occ    Drug use: No    Sexual activity: Yes     Partners: Male     Birth control/protection: Condom Male   Other Topics Concern    Not on file   Social History Narrative    Caffeine use     Social Determinants of Health     Financial Resource Strain: Not on file   Food Insecurity: Not on file   Transportation Needs: Not on file   Physical Activity: Not on file   Stress: Not on file   Social Connections:  "Not on file   Intimate Partner Violence: Not on file   Housing Stability: Not on file       Allergies   Allergen Reactions    Pollen Extract        Review of Systems  General- denies fever/chills  HEENT- denies hearing loss or sore throat  Eyes- denies eye pain or visual disturbances, denies red eyes  Respiratory- denies cough or SOB  Cardio- denies chest pain or palpitations  GI- denies abdominal pain  Endocrine- denies urinary frequency  Urinary- denies pain with urination  Musculoskeletal- Negative except noted above  Skin- denies rashes or wounds  Neurological- denies dizziness or headache  Psychiatric- denies anxiety or difficulty concentrating    Physical Exam  /82   Pulse 90   Ht 5' 8\" (1.727 m)   Wt 97.2 kg (214 lb 4.6 oz)   BMI 32.58 kg/m²     General/Constitutional: No apparent distress: well-nourished and well developed.  Lymphatic: No appreciable lymphadenopathy  Respiratory: Non-labored breathing  Vascular: No edema, swelling or tenderness, except as noted in detailed exam.  Integumentary: No impressive skin lesions present, except as noted in detailed exam.  Psych: Normal mood and affect, oriented to person, place and time.  MSK: normal other than stated in HPI and exam  Gait & balance: no evidence of myelopathic gait, ambulates Independently     Lumbar spine range of motion:  -Forward flexion to 90  -Extension to neutral  -Lateral bend 25 right, 25 left  -Rotation 25 right, 25 left  There tenderness with palpation along lumbar paraspinal musculature, no midline tenderness     Neurologic:    Lower Extremity Motor Function    Right  Left    Iliopsoas  5/5  5/5    Quadriceps 5/5 5/5   Tibialis anterior  5/5  5/5    EHL  5/5  5/5    Gastroc. muscle  5/5  5/5    Heel rise  5/5  5/5    Toe rise  5/5  5/5      Sensory: light touch is intact to bilateral upper and lower extremities     Reflexes:    Right Left   Patellar 1+ 1+   Achilles 1+ 1+   Babinski neg neg     Other tests:  Straight Leg Raise: " "negative  Migue SI: positive  JACKY SI: positive  Greater troch: no tenderness   Internal/external hip ROM: intact, no pain   Flexion/extension knee ROM: intact, no pain   Vascular: WWP extremities, 2+DP bilateral      Tompkins: positive on right  Inverted Radial: positive bilateral  Romberg: negative    Diagnostic Tests   IMAGING: I have personally reviewed the images and these are my findings:  Lumbar Spine X-rays from 7/31/23: multi level lumbar spondylosis with loss of disc height, osteophyte formation and facet hypertrophy, no apparent spondylolisthesis, no appreciated lytic/blastic lesions, no obvious instability, L4-5 ALIF cage intact    Cervical Spine X-rays from 7/31/23: multi level cervical spondylosis with loss of disc height, osteophyte formation and uncovertebral hypertrophy, no apparent spondylolisthesis, no appreciated lytic/blastic lesions, no obvious instability    Procedures, if performed today     None performed       Portions of the record may have been created with voice recognition software.  Occasional wrong word or \"sound a like\" substitutions may have occurred due to the inherent limitations of voice recognition software.  Read the chart carefully and recognize, using context, where substitutions have occurred.    "

## 2024-08-10 ENCOUNTER — HOSPITAL ENCOUNTER (OUTPATIENT)
Dept: MRI IMAGING | Facility: HOSPITAL | Age: 49
Discharge: HOME | End: 2024-08-10
Attending: ORTHOPAEDIC SURGERY
Payer: COMMERCIAL

## 2024-08-10 DIAGNOSIS — M54.16 LUMBAR RADICULOPATHY: ICD-10-CM

## 2024-08-10 DIAGNOSIS — M54.12 CERVICAL RADICULOPATHY: ICD-10-CM

## 2024-08-10 PROCEDURE — 72141 MRI NECK SPINE W/O DYE: CPT

## 2024-08-10 PROCEDURE — 72148 MRI LUMBAR SPINE W/O DYE: CPT

## 2024-08-27 ENCOUNTER — OFFICE VISIT (OUTPATIENT)
Dept: OBGYN CLINIC | Facility: HOSPITAL | Age: 49
End: 2024-08-27
Payer: COMMERCIAL

## 2024-08-27 VITALS
HEIGHT: 68 IN | HEART RATE: 85 BPM | SYSTOLIC BLOOD PRESSURE: 133 MMHG | WEIGHT: 214.29 LBS | BODY MASS INDEX: 32.48 KG/M2 | DIASTOLIC BLOOD PRESSURE: 87 MMHG

## 2024-08-27 DIAGNOSIS — M54.16 LUMBAR RADICULOPATHY: Primary | ICD-10-CM

## 2024-08-27 PROCEDURE — 99213 OFFICE O/P EST LOW 20 MIN: CPT | Performed by: ORTHOPAEDIC SURGERY

## 2024-08-27 NOTE — PROGRESS NOTES
Assessment & Plan/Medical Decision Makin y.o. female with Back Pain and Neck Pain imaging findings most notable for lumbar spondylosis , cervical spondylosis         The clinical, physical and imaging findings were reviewed with the patient.  Ellie  has a constellation of findings consistent with Lumbar Radiculopathy in the setting of lumbar degenerative disease, previous L4-ALIF.      We discussed the treatment options including physical therapy, at home exercises, activity modifications, chiropractic medicine, oral medications, interventional spine procedures.  At this time recommend continued conservative treatments.    Referral placed for physical therapy previously provided-recommend to work on core strengthening, lumbar ROM, strengthening, and stretching exercises.   Referral to pain management for evaluation and treatment. Discussed potential role of steroid injection at or near the source of pain to provide targeted relief.     Patient instructed to return to office/ER sooner if symptoms are not improving, getting worse, or new worrisome/neurologic symptoms arise.  Patient will follow up after further conservative treatments if symptoms continue.     Subjective:      Chief Complaint: Back Pain    HPI:  Ellie Cross is a 48 y.o. female presenting for initial visit with chief complaint of back pain and bilateral leg pain.  Pain began years ago but has recently been worse.  Pain typically managed with rest, OTC, exercise.  Denies weakness.  Also notes neck pain and bilateral arm pain with hand numbness.  Has noticed some dexterity issues.   Denies any get trauma. Denies fever or chills, no night sweats. Denies any bladder or bowel changes.      Conservative therapy includes the following:   Medications: Icy-hot, OTC    Injections: No     Physical Therapy: No  Chiropractic Medicine: has not attempted  Accupunture/Massage Therapy: has not attempted   These therapeutic modalities were ineffective at  providing sustained pain relief/functional improvement.     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  She notes her symptoms are relatively mild this week.  She notes fluctuations in her symptoms.  Her left lower back and left-sided lower extremity is severe when pain flares up.  She denies any other changes or symptoms.  Denies any numbness or tingling in her bilateral hands and noted difficulties with balance.  She did note some improvement with meloxicam.    Objective:     Family History   Problem Relation Age of Onset    Atrial fibrillation Mother     Hyperlipidemia Mother     Hypertension Mother     Supraventricular tachycardia Mother     Heart disease Mother     Diabetes Father             Hyperlipidemia Father     Hypertension Father             Arthritis Maternal Grandmother             Leukemia Maternal Grandmother     Cancer Maternal Grandmother         blood ca    Stroke Maternal Grandfather             Pancreatic cancer Maternal Grandfather 65    No Known Problems Sister     No Known Problems Paternal Grandmother     No Known Problems Paternal Grandfather     No Known Problems Sister     No Known Problems Paternal Aunt     No Known Problems Paternal Aunt     No Known Problems Paternal Aunt        Past Medical History:   Diagnosis Date    Abnormal Pap smear of cervix     Allergic     Arthritis     Asthma     Basal cell carcinoma 2024    Right Conchal bowl; MOHS    Breast lump     resolved [id number]    Depression     Disease of thyroid gland     Hair loss     Hemorrhage of conjunctiva, left     last assessed [id number]    Knee joint pain     right    Lyme disease     lastt assessed [id number]    Migraine     Thyroid disease        Current Outpatient Medications   Medication Sig Dispense Refill    acetaminophen (TYLENOL) 500 mg  tablet Take 2 tablets (1,000 mg total) by mouth every 6 (six) hours as needed for mild pain or moderate pain 30 tablet 0    albuterol (ProAir HFA) 90 mcg/act inhaler Inhale 2 puffs every 6 (six) hours as needed for wheezing 8.5 g 0    ALPRAZolam (XANAX) 0.5 mg tablet Take 1 tablet (0.5 mg total) by mouth daily as needed for anxiety 30 tablet 3    Diclofenac Sodium (VOLTAREN) 1 % Apply 2 g topically 3 (three) times a day as needed (for left knee pain) 50 g 1    ergocalciferol (VITAMIN D2) 50,000 units Take 1 capsule (50,000 Units total) by mouth once a week 12 capsule 0    ibuprofen (MOTRIN) 600 mg tablet Take 1 tablet (600 mg total) by mouth every 6 (six) hours as needed for mild pain or moderate pain 30 tablet 0    levothyroxine (Synthroid) 100 mcg tablet Take 1 tablet (100 mcg total) by mouth daily 90 tablet 3    liothyronine (CYTOMEL) 5 mcg tablet Take 1 tablet (5 mcg total) by mouth daily 30 tablet 0    meclizine (ANTIVERT) 12.5 MG tablet Take 1 tablet (12.5 mg total) by mouth 3 (three) times a day as needed for dizziness 30 tablet 0    meloxicam (Mobic) 15 mg tablet Take 1 tablet (15 mg total) by mouth daily 14 tablet 0    nystatin (MYCOSTATIN) cream Apply topically 2 (two) times a day 30 g 5    nystatin (MYCOSTATIN) powder Apply topically 3 (three) times a day 60 g 1    Progesterone 200 MG CAPS Take 200 mg by mouth at bedtime 90 capsule 3    scopolamine (TRANSDERM-SCOP) 1 mg/3 days TD 72 hr patch Place 1 patch on the skin over 72 hours every third day 10 patch 0    triamcinolone (KENALOG) 0.1 % cream Apply BID for up to 2 weeks to affected skin on neck down prn flares then take one week break and can repeat regimen prn flares. Do not apply to groin, other skin folds, face. 45 g 2    valACYclovir (VALTREX) 1,000 mg tablet Take 1 tablet (1,000 mg total) by mouth 2 (two) times a day for 5 days (Patient not taking: Reported on 8/10/2023) 10 tablet 5    ZOLMitriptan (ZOMIG) 5 MG tablet Take 1 tablet (5 mg total) by  mouth once as needed for migraine for up to 1 dose 6 tablet 0     Current Facility-Administered Medications   Medication Dose Route Frequency Provider Last Rate Last Admin    cyanocobalamin injection 1,000 mcg  1,000 mcg Intramuscular Q30 Days Mona Coley DO   1,000 mcg at 06/18/24 1143       Past Surgical History:   Procedure Laterality Date    LUMBAR FUSION  01/01/2004    Lumbar Vertebral Fusion;  2004    MOHS SURGERY Right 07/08/2024    BCC Right Conchal Bowl; Dr. Gagnon    NH HYSTEROSCOPY BX ENDOMETRIUM&/POLYPC W/WO D&C N/A 02/16/2022    Procedure: DILATATION AND CURETTAGE (D&C) WITH HYSTEROSCOPY;  Surgeon: Jenny Montoya MD;  Location: [facility];  Service: Gynecology    NH HYSTEROSCOPY ENDOMETRIAL ABLATION N/A 02/16/2022    Procedure: ABLATION ENDOMETRIAL FREDDY;  Surgeon: Jenny Montoya MD;  Location: [facility];  Service: Gynecology    NH LAPAROSCOPY W/RMVL ADNEXAL STRUCTURES Bilateral 02/16/2022    Procedure: SALPINGECTOMY, LAPAROSCOPIC;  Surgeon: Jenny Montoya MD;  Location: [facility];  Service: Gynecology    SPINE SURGERY  2005    Spinal fusion    WISDOM TOOTH EXTRACTION         Social History     Socioeconomic History    Marital status:      Spouse name: Not on file    Number of children: Not on file    Years of education: Not on file    Highest education level: Not on file   Occupational History    Not on file   Tobacco Use    Smoking status: Never     Passive exposure: Never    Smokeless tobacco: Never   Vaping Use    Vaping status: Never Used   Substance and Sexual Activity    Alcohol use: Not Currently     Comment: occ    Drug use: No    Sexual activity: Yes     Partners: Male     Birth control/protection: Condom Male   Other Topics Concern    Not on file   Social History Narrative    Caffeine use     Social Determinants of Health     Financial Resource Strain: Not on file   Food Insecurity: Not on file   Transportation Needs: Not on file   Physical Activity:  "Not on file   Stress: Not on file   Social Connections: Not on file   Intimate Partner Violence: Not on file   Housing Stability: Not on file       Allergies   Allergen Reactions    Pollen Extract        Review of Systems  General- denies fever/chills  HEENT- denies hearing loss or sore throat  Eyes- denies eye pain or visual disturbances, denies red eyes  Respiratory- denies cough or SOB  Cardio- denies chest pain or palpitations  GI- denies abdominal pain  Endocrine- denies urinary frequency  Urinary- denies pain with urination  Musculoskeletal- Negative except noted above  Skin- denies rashes or wounds  Neurological- denies dizziness or headache  Psychiatric- denies anxiety or difficulty concentrating    Physical Exam  /87   Pulse 85   Ht 5' 8\" (1.727 m)   Wt 97.2 kg (214 lb 4.6 oz)   BMI 32.58 kg/m²     General/Constitutional: No apparent distress: well-nourished and well developed.  Lymphatic: No appreciable lymphadenopathy  Respiratory: Non-labored breathing  Vascular: No edema, swelling or tenderness, except as noted in detailed exam.  Integumentary: No impressive skin lesions present, except as noted in detailed exam.  Psych: Normal mood and affect, oriented to person, place and time.  MSK: normal other than stated in HPI and exam  Gait & balance: no evidence of myelopathic gait, ambulates Independently     Lumbar spine range of motion:  -Forward flexion to 90  -Extension to neutral  -Lateral bend 25 right, 25 left  -Rotation 25 right, 25 left  There tenderness with palpation along lumbar paraspinal musculature, no midline tenderness     Neurologic:    Lower Extremity Motor Function    Right  Left    Iliopsoas  5/5  5/5    Quadriceps 5/5 5/5   Tibialis anterior  5/5  5/5    EHL  5/5  5/5    Gastroc. muscle  5/5  5/5    Heel rise  5/5  5/5    Toe rise  5/5  5/5      Sensory: light touch is intact to bilateral upper and lower extremities     Reflexes:    Right Left   Patellar 1+ 1+   Achilles 1+ 1+ " "  Babinski neg neg     Other tests:  Straight Leg Raise: negative  Migue SI: positive  JACKY SI: positive  Greater troch: no tenderness   Internal/external hip ROM: intact, no pain   Flexion/extension knee ROM: intact, no pain   Vascular: WWP extremities, 2+DP bilateral      Tompkins: positive on right  Inverted Radial: positive bilateral  Romberg: negative    Diagnostic Tests   IMAGING: I have personally reviewed the images and these are my findings:  Lumbar Spine X-rays from 7/31/23: multi level lumbar spondylosis with loss of disc height, osteophyte formation and facet hypertrophy, no apparent spondylolisthesis, no appreciated lytic/blastic lesions, no obvious instability, L4-5 ALIF cage intact    Cervical Spine X-rays from 7/31/23: multi level cervical spondylosis with loss of disc height, osteophyte formation and uncovertebral hypertrophy, no apparent spondylolisthesis, no appreciated lytic/blastic lesions, no obvious instability    Lumbar spine MRI from 8/10/2024: L4-5 ALIF construct intact, multilevel lumbar disc degeneration with adjacent segment degeneration at L5-S1 with loss of disc height, facet neuropathy and moderate left-sided foraminal stenosis, no significant central stenosis throughout lumbar spine    Cervical MRI from 8/10/2024: Multilevel mild cervical disc degeneration, no significant central stenosis, varying degrees of mild foraminal stenosis, no cord signal abnormalities appreciated    Procedures, if performed today     None performed       Portions of the record may have been created with voice recognition software.  Occasional wrong word or \"sound a like\" substitutions may have occurred due to the inherent limitations of voice recognition software.  Read the chart carefully and recognize, using context, where substitutions have occurred.    "

## 2024-09-09 ENCOUNTER — OFFICE VISIT (OUTPATIENT)
Dept: FAMILY MEDICINE CLINIC | Facility: CLINIC | Age: 49
End: 2024-09-09
Payer: COMMERCIAL

## 2024-09-09 VITALS
DIASTOLIC BLOOD PRESSURE: 70 MMHG | TEMPERATURE: 98.3 F | RESPIRATION RATE: 16 BRPM | WEIGHT: 217.4 LBS | BODY MASS INDEX: 32.95 KG/M2 | SYSTOLIC BLOOD PRESSURE: 112 MMHG | HEIGHT: 68 IN | HEART RATE: 91 BPM | OXYGEN SATURATION: 98 %

## 2024-09-09 DIAGNOSIS — E55.9 VITAMIN D DEFICIENCY: ICD-10-CM

## 2024-09-09 DIAGNOSIS — E05.90 HYPERTHYROIDISM: Primary | ICD-10-CM

## 2024-09-09 DIAGNOSIS — F32.5 MAJOR DEPRESSIVE DISORDER WITH SINGLE EPISODE, IN FULL REMISSION (HCC): ICD-10-CM

## 2024-09-09 DIAGNOSIS — E53.8 VITAMIN B12 DEFICIENCY: ICD-10-CM

## 2024-09-09 DIAGNOSIS — E78.5 MILD HYPERLIPIDEMIA: ICD-10-CM

## 2024-09-09 DIAGNOSIS — E66.09 CLASS 1 OBESITY DUE TO EXCESS CALORIES WITHOUT SERIOUS COMORBIDITY WITH BODY MASS INDEX (BMI) OF 33.0 TO 33.9 IN ADULT: ICD-10-CM

## 2024-09-09 PROCEDURE — 96372 THER/PROPH/DIAG INJ SC/IM: CPT

## 2024-09-09 PROCEDURE — 99214 OFFICE O/P EST MOD 30 MIN: CPT | Performed by: FAMILY MEDICINE

## 2024-09-09 RX ADMIN — CYANOCOBALAMIN 1000 MCG: 1000 INJECTION, SOLUTION INTRAMUSCULAR; SUBCUTANEOUS at 07:37

## 2024-09-09 NOTE — ASSESSMENT & PLAN NOTE
Ldl better. Tg are up  Orders:    Zepbound 2.5 MG/0.5ML auto-injector; Inject 0.5 mL (2.5 mg total) under the skin once a week    Zepbound 5 MG/0.5ML auto-injector; Inject 0.5 mL (5 mg total) under the skin once a week for 28 days Do not start before October 15, 2024.    Zepbound 7.5 MG/0.5ML auto-injector; Inject 0.5 mL (7.5 mg total) under the skin once a week for 28 days Do not start before November 12, 2024.    Zepbound 10 MG/0.5ML auto-injector; Inject 0.5 mL (10 mg total) under the skin once a week for 28 days Do not start before December 10, 2024.    Zepbound 12.5 MG/0.5ML auto-injector; Inject 0.5 mL (12.5 mg total) under the skin once a week for 28 days Do not start before January 7, 2025.    Zepbound 15 MG/0.5ML auto-injector; Inject 0.5 mL (15 mg total) under the skin once a week Do not start before February 4, 2025.

## 2024-09-09 NOTE — PROGRESS NOTES
Ambulatory Visit  Name: Ellie Cross      : 1975      MRN: [id number]  Encounter Provider: Mona Coley DO  Encounter Date: 2024   Encounter department: [facility]    Assessment & Plan  Hyperthyroidism  Possible due to synthroid however no change in weight?  Will check us thyroid  Refer to endocrine  Possible autoimmune    Orders:    US thyroid; Future    Ambulatory Referral to Endocrinology; Future    Anti-microsomal antibody; Future    Major depressive disorder with single episode, in full remission (HCC)  stable         Mild hyperlipidemia  Ldl better. Tg are up  Orders:    Zepbound 2.5 MG/0.5ML auto-injector; Inject 0.5 mL (2.5 mg total) under the skin once a week    Zepbound 5 MG/0.5ML auto-injector; Inject 0.5 mL (5 mg total) under the skin once a week for 28 days Do not start before October 15, 2024.    Zepbound 7.5 MG/0.5ML auto-injector; Inject 0.5 mL (7.5 mg total) under the skin once a week for 28 days Do not start before 2024.    Zepbound 10 MG/0.5ML auto-injector; Inject 0.5 mL (10 mg total) under the skin once a week for 28 days Do not start before December 10, 2024.    Zepbound 12.5 MG/0.5ML auto-injector; Inject 0.5 mL (12.5 mg total) under the skin once a week for 28 days Do not start before 2025.    Zepbound 15 MG/0.5ML auto-injector; Inject 0.5 mL (15 mg total) under the skin once a week Do not start before 2025.    Vitamin B12 deficiency         Vitamin D deficiency  Taking most of the time       Class 1 obesity due to excess calories without serious comorbidity with body mass index (BMI) of 33.0 to 33.9 in adult  Has been dieting and walking without relief for the last 6 month  Discussed weight loss meds to help with weight loss  Orders:    Zepbound 2.5 MG/0.5ML auto-injector; Inject 0.5 mL (2.5 mg total) under the skin once a week    Zepbound 5 MG/0.5ML auto-injector; Inject 0.5 mL (5 mg total) under the skin once a week  for 28 days Do not start before October 15, 2024.    Zepbound 7.5 MG/0.5ML auto-injector; Inject 0.5 mL (7.5 mg total) under the skin once a week for 28 days Do not start before November 12, 2024.    Zepbound 10 MG/0.5ML auto-injector; Inject 0.5 mL (10 mg total) under the skin once a week for 28 days Do not start before December 10, 2024.    Zepbound 12.5 MG/0.5ML auto-injector; Inject 0.5 mL (12.5 mg total) under the skin once a week for 28 days Do not start before January 7, 2025.    Zepbound 15 MG/0.5ML auto-injector; Inject 0.5 mL (15 mg total) under the skin once a week Do not start before February 4, 2025.  Prior Authorization Clinical Questions for Weight Management Pharmacotherapy     Does the patient have a contraindication to medication prescribed for weight management? no  Does the patient have a diagnosis of obesity, confirmed by a BMI greater than or equal to 30 kg/m^2? yes  Does the patient have a BMI of greater than or equal to 27 kg/m^2 with at least one weight-related comorbidity/risk factor/complication (e.g. diabetes, dyslipidemia, coronary artery disease)? yes  Weight-related comorbidity/ risk factor: dyslipidemia  Has the patient been on a weight loss regimen of low-calorie diet, increased physical activity, and lifestyle modifications for a minimum of 6 months? yes  Is the medication a controlled substance? no  If Yes, has the PDMP been checked and verified? no           History of Present Illness     History of Present Illness  Here for follow up. Moved and has a new job. Perimenopausal issues. Gaining weight. Knees have been bad. Back last month was flared. Diagnosed with skin cancer. Basal cell. Pressure at bottom of throat. Chocking all the time. Didn't approve it.      Review of Systems   Constitutional:  Positive for fatigue.   HENT:          Neck pressure   Eyes: Negative.    Respiratory: Negative.     Cardiovascular: Negative.    Gastrointestinal: Negative.    Endocrine: Negative.   "  Genitourinary: Negative.    Musculoskeletal:  Positive for arthralgias.   Skin: Negative.    Allergic/Immunologic: Negative.    Neurological: Negative.    Hematological: Negative.    Psychiatric/Behavioral:  Positive for sleep disturbance.      Objective     /70 (BP Location: Left arm, Patient Position: Sitting, Cuff Size: Large)   Pulse 91   Temp 98.3 °F (36.8 °C) (Tympanic)   Resp 16   Ht 5' 8\" (1.727 m)   Wt 98.6 kg (217 lb 6.4 oz)   SpO2 98%   BMI 33.06 kg/m²     Physical Exam    Physical Exam  Vitals and nursing note reviewed.   Constitutional:       Appearance: Normal appearance. She is well-developed.   HENT:      Head: Normocephalic and atraumatic.      Right Ear: External ear normal.      Left Ear: External ear normal.      Nose: Nose normal.   Eyes:      Extraocular Movements: Extraocular movements intact.      Conjunctiva/sclera: Conjunctivae normal.      Pupils: Pupils are equal, round, and reactive to light.   Cardiovascular:      Rate and Rhythm: Normal rate and regular rhythm.      Heart sounds: Normal heart sounds.   Pulmonary:      Effort: Pulmonary effort is normal.      Breath sounds: Normal breath sounds.   Abdominal:      General: Bowel sounds are normal.      Palpations: Abdomen is soft.   Musculoskeletal:         General: Normal range of motion.      Cervical back: Normal range of motion and neck supple.   Skin:     General: Skin is warm and dry.      Capillary Refill: Capillary refill takes less than 2 seconds.   Neurological:      Mental Status: She is alert and oriented to person, place, and time.   Psychiatric:         Behavior: Behavior normal.         Thought Content: Thought content normal.         Judgment: Judgment normal.         "

## 2024-09-09 NOTE — ASSESSMENT & PLAN NOTE
Has been dieting and walking without relief for the last 6 month  Discussed weight loss meds to help with weight loss  Orders:    Zepbound 2.5 MG/0.5ML auto-injector; Inject 0.5 mL (2.5 mg total) under the skin once a week    Zepbound 5 MG/0.5ML auto-injector; Inject 0.5 mL (5 mg total) under the skin once a week for 28 days Do not start before October 15, 2024.    Zepbound 7.5 MG/0.5ML auto-injector; Inject 0.5 mL (7.5 mg total) under the skin once a week for 28 days Do not start before November 12, 2024.    Zepbound 10 MG/0.5ML auto-injector; Inject 0.5 mL (10 mg total) under the skin once a week for 28 days Do not start before December 10, 2024.    Zepbound 12.5 MG/0.5ML auto-injector; Inject 0.5 mL (12.5 mg total) under the skin once a week for 28 days Do not start before January 7, 2025.    Zepbound 15 MG/0.5ML auto-injector; Inject 0.5 mL (15 mg total) under the skin once a week Do not start before February 4, 2025.  Prior Authorization Clinical Questions for Weight Management Pharmacotherapy     Does the patient have a contraindication to medication prescribed for weight management? no  Does the patient have a diagnosis of obesity, confirmed by a BMI greater than or equal to 30 kg/m^2? yes  Does the patient have a BMI of greater than or equal to 27 kg/m^2 with at least one weight-related comorbidity/risk factor/complication (e.g. diabetes, dyslipidemia, coronary artery disease)? yes  Weight-related comorbidity/ risk factor: dyslipidemia  Has the patient been on a weight loss regimen of low-calorie diet, increased physical activity, and lifestyle modifications for a minimum of 6 months? yes  Is the medication a controlled substance? no  If Yes, has the PDMP been checked and verified? no

## 2024-09-09 NOTE — ASSESSMENT & PLAN NOTE
Possible due to synthroid however no change in weight?  Will check us thyroid  Refer to endocrine  Possible autoimmune    Orders:    US thyroid; Future    Ambulatory Referral to Endocrinology; Future    Anti-microsomal antibody; Future

## 2024-09-11 ENCOUNTER — APPOINTMENT (OUTPATIENT)
Dept: LAB | Facility: HOSPITAL | Age: 49
End: 2024-09-11
Payer: COMMERCIAL

## 2024-09-11 ENCOUNTER — HOSPITAL ENCOUNTER (OUTPATIENT)
Dept: RADIOLOGY | Facility: HOSPITAL | Age: 49
Discharge: HOME | End: 2024-09-11
Payer: COMMERCIAL

## 2024-09-11 DIAGNOSIS — E05.90 HYPERTHYROIDISM: ICD-10-CM

## 2024-09-11 DIAGNOSIS — E03.9 ACQUIRED HYPOTHYROIDISM: ICD-10-CM

## 2024-09-11 DIAGNOSIS — Z13.1 SCREENING FOR DIABETES MELLITUS: ICD-10-CM

## 2024-09-11 DIAGNOSIS — E78.5 MILD HYPERLIPIDEMIA: ICD-10-CM

## 2024-09-11 DIAGNOSIS — M79.7 FIBROMYALGIA: ICD-10-CM

## 2024-09-11 DIAGNOSIS — M25.561 RIGHT KNEE PAIN, UNSPECIFIED CHRONICITY: ICD-10-CM

## 2024-09-11 DIAGNOSIS — I89.0 LYMPHEDEMA: ICD-10-CM

## 2024-09-11 LAB
CHOLEST SERPL-MCNC: 209 MG/DL
CRP SERPL QL: 7 MG/L
ERYTHROCYTE [SEDIMENTATION RATE] IN BLOOD: 33 MM/HOUR (ref 0–19)
EST. AVERAGE GLUCOSE BLD GHB EST-MCNC: 117 MG/DL
HBA1C MFR BLD: 5.7 %
HDLC SERPL-MCNC: 44 MG/DL
LDLC SERPL CALC-MCNC: 129 MG/DL (ref 0–100)
TRIGL SERPL-MCNC: 182 MG/DL
TSH SERPL DL<=0.05 MIU/L-ACNC: 3.37 UIU/ML (ref 0.45–4.5)

## 2024-09-11 PROCEDURE — 84443 ASSAY THYROID STIM HORMONE: CPT

## 2024-09-11 PROCEDURE — 83036 HEMOGLOBIN GLYCOSYLATED A1C: CPT

## 2024-09-11 PROCEDURE — 76536 US EXAM OF HEAD AND NECK: CPT

## 2024-09-11 PROCEDURE — 86140 C-REACTIVE PROTEIN: CPT

## 2024-09-11 PROCEDURE — 36415 COLL VENOUS BLD VENIPUNCTURE: CPT

## 2024-09-11 PROCEDURE — 85652 RBC SED RATE AUTOMATED: CPT

## 2024-09-11 PROCEDURE — 80061 LIPID PANEL: CPT

## 2024-09-11 PROCEDURE — 86376 MICROSOMAL ANTIBODY EACH: CPT

## 2024-09-12 LAB — THYROPEROXIDASE AB SERPL-ACNC: 13 IU/ML (ref 0–34)

## 2024-09-17 ENCOUNTER — TELEPHONE (OUTPATIENT)
Age: 49
End: 2024-09-17

## 2024-09-17 RX ORDER — TIRZEPATIDE 5 MG/.5ML
5 INJECTION, SOLUTION SUBCUTANEOUS WEEKLY
Qty: 2 ML | Refills: 0 | Status: SHIPPED | OUTPATIENT
Start: 2024-10-15 | End: 2024-11-12

## 2024-09-17 RX ORDER — TIRZEPATIDE 7.5 MG/.5ML
7.5 INJECTION, SOLUTION SUBCUTANEOUS WEEKLY
Qty: 2 ML | Refills: 0 | Status: SHIPPED | OUTPATIENT
Start: 2024-11-12 | End: 2024-12-10

## 2024-09-17 RX ORDER — TIRZEPATIDE 12.5 MG/.5ML
12.5 INJECTION, SOLUTION SUBCUTANEOUS WEEKLY
Qty: 2 ML | Refills: 0 | Status: SHIPPED | OUTPATIENT
Start: 2025-01-07 | End: 2025-02-04

## 2024-09-17 RX ORDER — TIRZEPATIDE 15 MG/.5ML
15 INJECTION, SOLUTION SUBCUTANEOUS WEEKLY
Qty: 6 ML | Refills: 0 | Status: SHIPPED | OUTPATIENT
Start: 2025-02-04

## 2024-09-17 RX ORDER — TIRZEPATIDE 10 MG/.5ML
10 INJECTION, SOLUTION SUBCUTANEOUS WEEKLY
Qty: 2 ML | Refills: 0 | Status: SHIPPED | OUTPATIENT
Start: 2024-12-10 | End: 2025-01-07

## 2024-09-17 RX ORDER — TIRZEPATIDE 2.5 MG/.5ML
2.5 INJECTION, SOLUTION SUBCUTANEOUS WEEKLY
Qty: 2 ML | Refills: 0 | Status: SHIPPED | OUTPATIENT
Start: 2024-09-17

## 2024-09-17 NOTE — TELEPHONE ENCOUNTER
PA for Zepbound 2.5 MG/0.5ML auto-injector APPROVED     Date(s) approved 9- - 9-        Patient advised by          [x][employer]  [x]Phone call   []LMOM  []L/M to call office as no active Communication consent on file  []Unable to leave detailed message as VM not approved on Communication consent       Pharmacy advised by    [x]Fax  []Phone call    Approval letter scanned into Media no letter not available at this time

## 2024-09-17 NOTE — TELEPHONE ENCOUNTER
PA for Zepbound 2.5 MG/0.5ML auto-injector SUBMITTED     via    []CMM-KEY:   [x]Roneyrireinaldo-Case ID # [id number]  []Faxed to plan   []Other website   []Phone call Case ID #     Office notes sent, clinical questions answered. Awaiting determination    Turnaround time for your insurance to make a decision on your Prior Authorization can take 7-21 business days.

## 2024-09-18 DIAGNOSIS — E03.8 HYPOTHYROIDISM DUE TO HASHIMOTO'S THYROIDITIS: ICD-10-CM

## 2024-09-18 DIAGNOSIS — E06.3 HYPOTHYROIDISM DUE TO HASHIMOTO'S THYROIDITIS: ICD-10-CM

## 2024-09-18 DIAGNOSIS — R11.0 NAUSEA: Primary | ICD-10-CM

## 2024-09-18 RX ORDER — LIOTHYRONINE SODIUM 5 UG/1
5 TABLET ORAL DAILY
Qty: 30 TABLET | Refills: 5 | Status: SHIPPED | OUTPATIENT
Start: 2024-09-18

## 2024-09-18 RX ORDER — ONDANSETRON 4 MG/1
4 TABLET, ORALLY DISINTEGRATING ORAL EVERY 6 HOURS PRN
Qty: 10 TABLET | Refills: 1 | Status: SHIPPED | OUTPATIENT
Start: 2024-09-18

## 2024-10-01 ENCOUNTER — CONSULT (OUTPATIENT)
Dept: ENDOCRINOLOGY | Facility: CLINIC | Age: 49
End: 2024-10-01
Payer: COMMERCIAL

## 2024-10-01 VITALS
DIASTOLIC BLOOD PRESSURE: 76 MMHG | OXYGEN SATURATION: 95 % | BODY MASS INDEX: 32.58 KG/M2 | TEMPERATURE: 97.7 F | HEIGHT: 68 IN | WEIGHT: 215 LBS | SYSTOLIC BLOOD PRESSURE: 110 MMHG | HEART RATE: 99 BPM

## 2024-10-01 DIAGNOSIS — E78.2 MIXED HYPERLIPIDEMIA: ICD-10-CM

## 2024-10-01 DIAGNOSIS — E55.9 VITAMIN D DEFICIENCY: ICD-10-CM

## 2024-10-01 DIAGNOSIS — E06.3 HYPOTHYROIDISM DUE TO HASHIMOTO THYROIDITIS: Primary | ICD-10-CM

## 2024-10-01 DIAGNOSIS — E66.09 CLASS 1 OBESITY DUE TO EXCESS CALORIES WITHOUT SERIOUS COMORBIDITY WITH BODY MASS INDEX (BMI) OF 33.0 TO 33.9 IN ADULT: ICD-10-CM

## 2024-10-01 DIAGNOSIS — E61.1 IRON DEFICIENCY: ICD-10-CM

## 2024-10-01 DIAGNOSIS — E03.9 ACQUIRED HYPOTHYROIDISM: ICD-10-CM

## 2024-10-01 DIAGNOSIS — E66.811 CLASS 1 OBESITY DUE TO EXCESS CALORIES WITHOUT SERIOUS COMORBIDITY WITH BODY MASS INDEX (BMI) OF 33.0 TO 33.9 IN ADULT: ICD-10-CM

## 2024-10-01 PROBLEM — E05.90 HYPERTHYROIDISM: Status: RESOLVED | Noted: 2024-09-09 | Resolved: 2024-10-01

## 2024-10-01 PROCEDURE — 99244 OFF/OP CNSLTJ NEW/EST MOD 40: CPT | Performed by: INTERNAL MEDICINE

## 2024-10-01 RX ORDER — LEVOTHYROXINE SODIUM 125 UG/1
125 TABLET ORAL DAILY
Qty: 90 TABLET | Refills: 1 | Status: SHIPPED | OUTPATIENT
Start: 2024-10-01

## 2024-10-01 NOTE — ASSESSMENT & PLAN NOTE
Today we discussed all aspects of obesity and metabolism including pathophysiology, risk factors, complications, goal of sustaining weight loss long term, usual propensity to regain weight with short term approaches, follow up needs and medications - efficacy and limitations.   Discussed role of endocrinopathies, inflammatory conditions, sleep disorders, mental health disorders, lifestyle issues and polypharmacy and weight gain.  Briefly discussed bariatric surgery.  Diet: carb controlled diet <1500cal/day/ VLCD, 64oz fluids/day   lifestyle modifications: intermittent fasting and 10,000 steps/day  medical fitness training: muscle building  education: nutrition input    Advised to continue diet/lifestyle and medications to achieve goal weight of 170 lbs over next 8-12 months

## 2024-10-01 NOTE — ASSESSMENT & PLAN NOTE
ASCVD risk is low but non HDL is above goal. May treat if LDL >190mg/dL.  Elevated triglycerides suggest metabolic syndrome.

## 2024-10-01 NOTE — PROGRESS NOTES
"    Follow-up Patient Progress Note      CC: weight gain, hypothyroidism    History of Present Illness:   48 yr female with hypothyroidism, obesity, iron deficiency, anxiety/stress and vitamin D deficiency.    She reports some hair loss, and inability to loose weight.    She was started on levothyroxine age 21 yrs and progressed to dose 125mcg qdaily.  She started on liothyronine 5mcg about a year ago and levothyroxine dose was reduced to 100mcg qdaily.      Physical Exam:  Body mass index is 32.69 kg/m².  /76 (BP Location: Left arm, Patient Position: Sitting, Cuff Size: Standard)   Pulse 99   Temp 97.7 °F (36.5 °C) (Temporal)   Ht 5' 8\" (1.727 m)   Wt 97.5 kg (215 lb)   SpO2 95%   BMI 32.69 kg/m²    Vitals:    10/01/24 1328   Weight: 97.5 kg (215 lb)        Physical Exam  Constitutional:       General: She is not in acute distress.     Appearance: She is well-developed.   HENT:      Head: Normocephalic and atraumatic.      Nose: Nose normal.   Eyes:      Conjunctiva/sclera: Conjunctivae normal.   Pulmonary:      Effort: Pulmonary effort is normal.   Abdominal:      General: There is no distension.   Musculoskeletal:      Cervical back: Normal range of motion and neck supple.   Skin:     Findings: No rash.      Comments: No icterus   Neurological:      Mental Status: She is alert and oriented to person, place, and time.         Labs:   Lab Results   Component Value Date    HGBA1C 5.7 (H) 09/11/2024       Lab Results   Component Value Date    XRC9FCBVSARR 3.368 09/11/2024    C5IYBRY 2.0 (H) 03/11/2016    R6BECWM 14.6 (H) 03/11/2016       Lab Results   Component Value Date    CREATININE 0.61 10/19/2023    CREATININE 0.65 03/08/2023    CREATININE 0.73 05/27/2022    BUN 9 10/19/2023     03/11/2015    K 4.0 10/19/2023     10/19/2023    CO2 27 10/19/2023     eGFR   Date Value Ref Range Status   10/19/2023 109 ml/min/1.73sq m Final       Lab Results   Component Value Date    ALT 14 10/19/2023    AST " 18 10/19/2023    ALKPHOS 88 10/19/2023    BILITOT 0.5 03/11/2015       Lab Results   Component Value Date    CHOLESTEROL 209 (H) 09/11/2024    CHOLESTEROL 228 (H) 06/12/2024    CHOLESTEROL 230 (H) 10/19/2023     Lab Results   Component Value Date    HDL 44 (L) 09/11/2024    HDL 47 (L) 06/12/2024    HDL 50 10/19/2023     Lab Results   Component Value Date    TRIG 182 (H) 09/11/2024    TRIG 341 (H) 06/12/2024    TRIG 243 (H) 10/19/2023     Lab Results   Component Value Date    NONHDLC 181 06/12/2024    NONHDLC 135 06/15/2021         Assessment/Plan:    1. Hypothyroidism due to Hashimoto thyroiditis  -     Ambulatory Referral to Endocrinology  -     T4, free; Future  -     TSH, 3rd generation; Future  -     Thyroid Antibodies Panel; Future  -     T3; Future  2. Iron deficiency  Assessment & Plan:  Iron deficiency w/o anemia may mimic hypothyroid symptoms.  Orders:  -     Iron Panel (Includes Ferritin, Iron Sat%, Iron, and TIBC); Future  -     CBC and differential; Future  3. Acquired hypothyroidism  Assessment & Plan:  She has long standing hx of hypothyroidism suspected autoimmune etiology.    She has some symptoms that may correlate to hypothyroidism in spite of using combined T4 and T3 therapy and adequate TSH levels.    Today we discussed all aspects of hypothyroidism including normal thyroid physiology, pathophysiology, review of data, treatment options, dose titration and follow up needs.    We agreed to stop cytomel and continue levothyroxine 125mcg qdaily.  Note US thyroid shows a diminutive thyroid consistent with levothyroxine use long term.    We agreed to repeat a complete thyroid profile and then make further adjustments. I opined that many of her symptoms may not actually be thyroid related. Will r/o iron deficiency and other autoimmune conditions vs obesity related symptoms.  Follow up in 6 months.  Orders:  -     levothyroxine (Synthroid) 125 mcg tablet; Take 1 tablet (125 mcg total) by mouth daily  4.  Mixed hyperlipidemia  Assessment & Plan:  ASCVD risk is low but non HDL is above goal. May treat if LDL >190mg/dL.  Elevated triglycerides suggest metabolic syndrome.  5. Class 1 obesity due to excess calories without serious comorbidity with body mass index (BMI) of 33.0 to 33.9 in adult  Assessment & Plan:  Today we discussed all aspects of obesity and metabolism including pathophysiology, risk factors, complications, goal of sustaining weight loss long term, usual propensity to regain weight with short term approaches, follow up needs and medications - efficacy and limitations.   Discussed role of endocrinopathies, inflammatory conditions, sleep disorders, mental health disorders, lifestyle issues and polypharmacy and weight gain.  Briefly discussed bariatric surgery.  Diet: carb controlled diet <1500cal/day/ VLCD, 64oz fluids/day   lifestyle modifications: intermittent fasting and 10,000 steps/day  medical fitness training: muscle building  education: nutrition input    Advised to continue diet/lifestyle and medications to achieve goal weight of 170 lbs over next 8-12 months  6. Vitamin D deficiency        I have spent a total time of 30 minutes on 10/01/24 in caring for this patient including greater than 50% of this time was spent in counseling/coordination of care as listed above.       Discussed with the patient and all questioned fully answered. She will contact me with concerns.    Shama Brown

## 2024-10-01 NOTE — PATIENT INSTRUCTIONS
Instructions    Diet:   Take 1500 rose/day of balanced diet with 1/3rd carbs, 1/3rd protein and rest fiber and small amount fat.   Drink at least 64 oz fluids daily.    Lifestyle:   30 min brisk activity most days. 150min-220min/week of cardiac and muscle building exercise that causes sweating.  Consider [facility] medical fitness training program.  Medical fitness: follow these exercise links  Full Version - [email or web address]     Warmup - [email or web address]     Lower Body - [email or web address]     Upper Body - [email or web address]     Core -  [email or web address]    Fasting:  Can consider after becoming regular with exercise - 14 to 24 hrs fasting 1-3 times a week.    Medications:   look up Wegovy, Zepbound, saxenda - weight loss meds.  Consider switching from medications that cause weight gain to weight neutral medications.    Other:   Make sure you are treated appropriately if you have sleep apnea.  May consider bariatric surgery if we dont see benefit over 6-12 months of all above.

## 2024-10-01 NOTE — ASSESSMENT & PLAN NOTE
She has long standing hx of hypothyroidism suspected autoimmune etiology.    She has some symptoms that may correlate to hypothyroidism in spite of using combined T4 and T3 therapy and adequate TSH levels.    Today we discussed all aspects of hypothyroidism including normal thyroid physiology, pathophysiology, review of data, treatment options, dose titration and follow up needs.    We agreed to stop cytomel and continue levothyroxine 125mcg qdaily.  Note US thyroid shows a diminutive thyroid consistent with levothyroxine use long term.    We agreed to repeat a complete thyroid profile and then make further adjustments. I opined that many of her symptoms may not actually be thyroid related. Will r/o iron deficiency and other autoimmune conditions vs obesity related symptoms.  Follow up in 6 months.

## 2024-10-02 ENCOUNTER — OFFICE VISIT (OUTPATIENT)
Dept: OBGYN CLINIC | Facility: OTHER | Age: 49
End: 2024-10-02

## 2024-10-02 VITALS — HEART RATE: 93 BPM | SYSTOLIC BLOOD PRESSURE: 114 MMHG | DIASTOLIC BLOOD PRESSURE: 80 MMHG

## 2024-10-02 DIAGNOSIS — M25.562 LEFT KNEE PAIN, UNSPECIFIED CHRONICITY: Primary | ICD-10-CM

## 2024-10-02 DIAGNOSIS — M17.12 PRIMARY OSTEOARTHRITIS OF LEFT KNEE: ICD-10-CM

## 2024-10-03 ENCOUNTER — TELEPHONE (OUTPATIENT)
Dept: OBGYN CLINIC | Facility: OTHER | Age: 49
End: 2024-10-03

## 2024-10-05 NOTE — PROGRESS NOTES
Assessment:       1. Left knee pain, unspecified chronicity    2. Primary osteoarthritis of left knee          Plan:        Explained my current clinical findings with the patient.  She has some worsening of her left knee pain and wishes to have a trial of ultrasound-guided left knee intra-articular corticosteroid injection for which she will be accordingly scheduled.  In the interim she may continue to take oral versus topical NSAIDs for symptomatic relief.  Patient expressed understanding and was in agreement with the treatment plan.            Subjective:     Patient ID: Ellie Cross is a 48 y.o. female.    Chief Complaint:    HPI  Patient presents today for a follow-up of her left knee pain.  Last seen in this regard on 2/19/2024.  Noted to have left knee osteoarthritis.  Also has a known history of right knee osteoarthritis for which she has received ultrasound-guided viscosupplementation injection in the past with good results.  At this time, patient does not has a significant right knee pain but does report worsening of the left knee pain.  In the past, she has tried topical analgesia but now reports that symptoms are not adequately controlled and is interested in an injection of the left knee.  Denies any trauma to the left knee.  Pain is diffuse and more prominently in the anterior and medial aspects.  No reported instability or locking.     Social History     Occupational History    Not on file   Tobacco Use    Smoking status: Never     Passive exposure: Never    Smokeless tobacco: Never   Vaping Use    Vaping status: Never Used   Substance and Sexual Activity    Alcohol use: Not Currently    Drug use: No    Sexual activity: Yes     Partners: Male     Birth control/protection: Condom Male      Review of Systems        Objective:     Left Knee Exam     Tenderness   The patient is experiencing tenderness in the medial joint line (Patellofemoral).    Range of Motion   Extension:  normal   Flexion:  130      Tests   Varus: negative Valgus: negative  Lachman:  Anterior - negative      Drawer:  Anterior - negative     Posterior - negative  Patellar apprehension: negative    Other   Erythema: absent  Swelling: mild    Comments:  Negative lateral Nicolle's and equivocal medial Nicolle's.        Physical Exam  Vitals and nursing note reviewed.   Constitutional:       Appearance: She is well-developed.   HENT:      Head: Normocephalic.   Cardiovascular:      Rate and Rhythm: Normal rate.   Pulmonary:      Effort: Pulmonary effort is normal. No respiratory distress.   Skin:     General: Skin is warm and dry.      Findings: No erythema.   Neurological:      Mental Status: She is alert and oriented to person, place, and time.      Cranial Nerves: No cranial nerve deficit.   Psychiatric:         Behavior: Behavior normal.         Thought Content: Thought content normal.         Judgment: Judgment normal.           I have personally reviewed pertinent films in PACS and my interpretation is plain radiograph of the left knee from 2/19/2024 reveals mild knee osteoarthritis..

## 2024-10-15 ENCOUNTER — TELEPHONE (OUTPATIENT)
Dept: OBGYN CLINIC | Facility: OTHER | Age: 49
End: 2024-10-15

## 2024-11-27 DIAGNOSIS — E66.09 CLASS 1 OBESITY DUE TO EXCESS CALORIES WITHOUT SERIOUS COMORBIDITY WITH BODY MASS INDEX (BMI) OF 33.0 TO 33.9 IN ADULT: ICD-10-CM

## 2024-11-27 DIAGNOSIS — E78.5 MILD HYPERLIPIDEMIA: ICD-10-CM

## 2024-11-27 DIAGNOSIS — E66.811 CLASS 1 OBESITY DUE TO EXCESS CALORIES WITHOUT SERIOUS COMORBIDITY WITH BODY MASS INDEX (BMI) OF 33.0 TO 33.9 IN ADULT: ICD-10-CM

## 2024-11-27 RX ORDER — TIRZEPATIDE 5 MG/.5ML
5 INJECTION, SOLUTION SUBCUTANEOUS WEEKLY
Qty: 2 ML | Refills: 3 | Status: SHIPPED | OUTPATIENT
Start: 2024-11-27 | End: 2024-12-25

## 2024-12-20 ENCOUNTER — APPOINTMENT (OUTPATIENT)
Dept: LAB | Facility: CLINIC | Age: 49
End: 2024-12-20
Payer: COMMERCIAL

## 2024-12-20 DIAGNOSIS — E61.1 IRON DEFICIENCY: ICD-10-CM

## 2024-12-20 DIAGNOSIS — E06.3 HYPOTHYROIDISM DUE TO HASHIMOTO THYROIDITIS: ICD-10-CM

## 2024-12-20 LAB
BASOPHILS # BLD AUTO: 0.08 THOUSANDS/ΜL (ref 0–0.1)
BASOPHILS NFR BLD AUTO: 1 % (ref 0–1)
EOSINOPHIL # BLD AUTO: 0.2 THOUSAND/ΜL (ref 0–0.61)
EOSINOPHIL NFR BLD AUTO: 3 % (ref 0–6)
ERYTHROCYTE [DISTWIDTH] IN BLOOD BY AUTOMATED COUNT: 13.1 % (ref 11.6–15.1)
FERRITIN SERPL-MCNC: 25 NG/ML (ref 11–307)
HCT VFR BLD AUTO: 39.6 % (ref 34.8–46.1)
HGB BLD-MCNC: 12.9 G/DL (ref 11.5–15.4)
IMM GRANULOCYTES # BLD AUTO: 0.02 THOUSAND/UL (ref 0–0.2)
IMM GRANULOCYTES NFR BLD AUTO: 0 % (ref 0–2)
IRON SATN MFR SERPL: 15 % (ref 15–50)
IRON SERPL-MCNC: 68 UG/DL (ref 50–212)
LYMPHOCYTES # BLD AUTO: 2.95 THOUSANDS/ΜL (ref 0.6–4.47)
LYMPHOCYTES NFR BLD AUTO: 39 % (ref 14–44)
MCH RBC QN AUTO: 29.6 PG (ref 26.8–34.3)
MCHC RBC AUTO-ENTMCNC: 32.6 G/DL (ref 31.4–37.4)
MCV RBC AUTO: 91 FL (ref 82–98)
MONOCYTES # BLD AUTO: 0.5 THOUSAND/ΜL (ref 0.17–1.22)
MONOCYTES NFR BLD AUTO: 7 % (ref 4–12)
NEUTROPHILS # BLD AUTO: 3.78 THOUSANDS/ΜL (ref 1.85–7.62)
NEUTS SEG NFR BLD AUTO: 50 % (ref 43–75)
NRBC BLD AUTO-RTO: 0 /100 WBCS
PLATELET # BLD AUTO: 299 THOUSANDS/UL (ref 149–390)
PMV BLD AUTO: 9.6 FL (ref 8.9–12.7)
RBC # BLD AUTO: 4.36 MILLION/UL (ref 3.81–5.12)
T3 SERPL-MCNC: 1.4 NG/ML (ref 0.9–1.8)
T4 FREE SERPL-MCNC: 1.16 NG/DL (ref 0.61–1.12)
TIBC SERPL-MCNC: 455 UG/DL (ref 250–450)
TRANSFERRIN SERPL-MCNC: 325 MG/DL (ref 203–362)
TSH SERPL DL<=0.05 MIU/L-ACNC: 1.7 UIU/ML (ref 0.45–4.5)
UIBC SERPL-MCNC: 387 UG/DL (ref 155–355)
WBC # BLD AUTO: 7.53 THOUSAND/UL (ref 4.31–10.16)

## 2024-12-20 PROCEDURE — 83540 ASSAY OF IRON: CPT

## 2024-12-20 PROCEDURE — 83550 IRON BINDING TEST: CPT

## 2024-12-20 PROCEDURE — 36415 COLL VENOUS BLD VENIPUNCTURE: CPT

## 2024-12-20 PROCEDURE — 86800 THYROGLOBULIN ANTIBODY: CPT

## 2024-12-20 PROCEDURE — 84480 ASSAY TRIIODOTHYRONINE (T3): CPT

## 2024-12-20 PROCEDURE — 84443 ASSAY THYROID STIM HORMONE: CPT

## 2024-12-20 PROCEDURE — 85025 COMPLETE CBC W/AUTO DIFF WBC: CPT

## 2024-12-20 PROCEDURE — 86376 MICROSOMAL ANTIBODY EACH: CPT

## 2024-12-20 PROCEDURE — 82728 ASSAY OF FERRITIN: CPT

## 2024-12-20 PROCEDURE — 84439 ASSAY OF FREE THYROXINE: CPT

## 2024-12-21 LAB
THYROGLOB AB SERPL-ACNC: <1 IU/ML (ref 0–0.9)
THYROPEROXIDASE AB SERPL-ACNC: 17 IU/ML (ref 0–34)

## 2025-01-07 ENCOUNTER — RESULTS FOLLOW-UP (OUTPATIENT)
Dept: ENDOCRINOLOGY | Facility: CLINIC | Age: 50
End: 2025-01-07

## 2025-01-15 ENCOUNTER — OFFICE VISIT (OUTPATIENT)
Dept: FAMILY MEDICINE CLINIC | Facility: CLINIC | Age: 50
End: 2025-01-15
Payer: COMMERCIAL

## 2025-01-15 VITALS
BODY MASS INDEX: 29.1 KG/M2 | HEART RATE: 80 BPM | OXYGEN SATURATION: 98 % | HEIGHT: 68 IN | RESPIRATION RATE: 17 BRPM | TEMPERATURE: 98 F | DIASTOLIC BLOOD PRESSURE: 86 MMHG | SYSTOLIC BLOOD PRESSURE: 120 MMHG | WEIGHT: 192 LBS

## 2025-01-15 DIAGNOSIS — E66.09 CLASS 1 OBESITY DUE TO EXCESS CALORIES WITHOUT SERIOUS COMORBIDITY WITH BODY MASS INDEX (BMI) OF 33.0 TO 33.9 IN ADULT: ICD-10-CM

## 2025-01-15 DIAGNOSIS — E53.8 VITAMIN B12 DEFICIENCY: ICD-10-CM

## 2025-01-15 DIAGNOSIS — E66.811 CLASS 1 OBESITY DUE TO EXCESS CALORIES WITHOUT SERIOUS COMORBIDITY WITH BODY MASS INDEX (BMI) OF 33.0 TO 33.9 IN ADULT: ICD-10-CM

## 2025-01-15 DIAGNOSIS — E78.5 MILD HYPERLIPIDEMIA: ICD-10-CM

## 2025-01-15 DIAGNOSIS — E78.2 MIXED HYPERLIPIDEMIA: ICD-10-CM

## 2025-01-15 DIAGNOSIS — F32.5 MAJOR DEPRESSIVE DISORDER WITH SINGLE EPISODE, IN FULL REMISSION (HCC): ICD-10-CM

## 2025-01-15 DIAGNOSIS — Z13.1 SCREENING FOR DIABETES MELLITUS: ICD-10-CM

## 2025-01-15 DIAGNOSIS — E03.9 ACQUIRED HYPOTHYROIDISM: Primary | ICD-10-CM

## 2025-01-15 DIAGNOSIS — R70.0 ELEVATED SED RATE: ICD-10-CM

## 2025-01-15 PROCEDURE — 96372 THER/PROPH/DIAG INJ SC/IM: CPT

## 2025-01-15 PROCEDURE — 99214 OFFICE O/P EST MOD 30 MIN: CPT | Performed by: FAMILY MEDICINE

## 2025-01-15 RX ORDER — TIRZEPATIDE 5 MG/.5ML
5 INJECTION, SOLUTION SUBCUTANEOUS WEEKLY
Qty: 2 ML | Refills: 3 | Status: SHIPPED | OUTPATIENT
Start: 2025-01-15 | End: 2025-02-12

## 2025-01-15 RX ADMIN — CYANOCOBALAMIN 1000 MCG: 1000 INJECTION, SOLUTION INTRAMUSCULAR; SUBCUTANEOUS at 16:52

## 2025-01-15 NOTE — PROGRESS NOTES
Name: Ellie Cross      : 1975      MRN: [id number]  Encounter Provider: Mona Coley DO  Encounter Date: 1/15/2025   Encounter department: [facility]    Assessment & Plan  Acquired hypothyroidism  Did see endocrine  Levels back in range   Orders:  •  TSH, 3rd generation with Free T4 reflex; Future    Mixed hyperlipidemia  Check levels       Vitamin B12 deficiency  B12 today       Major depressive disorder with single episode, in full remission (HCC)  Depression Screening Follow-up Plan: Patient's depression screening was positive with a PHQ-9 score of 6. Patient with underlying depression and was advised to continue current medications as prescribed.         Class 1 obesity due to excess calories without serious comorbidity with body mass index (BMI) of 33.0 to 33.9 in adult  Prior Authorization Clinical Questions for Weight Management Pharmacotherapy    1. Does the patient have a contrainidcation to medication prescribed for weight management?: No  2. Does the patient have a diagnosis of obesity, confirmed by a BMI greater than or equal to 30 kg/m^2?: No  3. Does the patient have a BMI of greater than or equal to 27 kg/m^2 with at least one weight-related comorbidity/risk factor/complication (e.g. diabetes, dyslipidemia, coronary artery disease)?: Yes  4. Weight-related co-morbidities/risk factors: metabolic syndrome, dyslipidemia, GERD, osteoarthritis  5. Has the patient been on a weight loss regimen of low-calorie diet, increased physical activity, and lifestyle modifications for a minimum of 6 months?: Yes  6. Has the patient completed a comprehensive weight loss program (ie, Weight Watchers, Noom, Bariatrics, other huyen on phone)? If so, what?: Yes   -- Q6 Further Explanation: weight watchers   7. Does the patient have a history of type 2 diabetes?: No  8. Has the member tried and failed other weight loss medication within the past 12 months?: No  9. Will the member use  requested medication in combination with another GLP agonist or weight loss drug?: No  10. Is the medication a controlled substance?: No  For renewals: Has the patient had a positive outcome with current weight management medication (i.e., change in body weight of at least 4-5% after 12-16 weeks on maximally tolerated dose)?: Yes     Baseline weight (in pounds): 217 lbs  Current weight (in pounds): 192 lbs  Weight loss percentage: -11.52%       Unable to increase to 7.5  Has rash- stomach and face  Injection site reaction at that dose  Needed to drop down to 5  Orders:  •  Zepbound 5 MG/0.5ML auto-injector; Inject 0.5 mL (5 mg total) under the skin once a week for 28 days Side effects from the 7.5 dose    Mild hyperlipidemia    Orders:  •  Zepbound 5 MG/0.5ML auto-injector; Inject 0.5 mL (5 mg total) under the skin once a week for 28 days Side effects from the 7.5 dose  •  Comprehensive metabolic panel; Future  •  Lipid Panel with Direct LDL reflex; Future    Screening for diabetes mellitus    Orders:  •  Hemoglobin A1C; Future    Elevated sed rate    Orders:  •  Sedimentation rate, automated; Future      Assessment & Plan      Depression Screening and Follow-up Plan:   Patient with underlying depression and was advised to continue current medications as prescribed.       History of Present Illness     History of Present Illness  Here for follow up  Job not quite what she expected...  Health wise  Started injections     Review of Systems   Constitutional: Negative.    HENT: Negative.     Eyes: Negative.    Respiratory: Negative.     Cardiovascular: Negative.    Gastrointestinal: Negative.    Endocrine: Negative.    Genitourinary: Negative.    Musculoskeletal:         Left tibial lump   Allergic/Immunologic: Negative.    Neurological: Negative.    Hematological: Negative.    Psychiatric/Behavioral: Negative.       Objective   /86 (BP Location: Left arm, Patient Position: Sitting, Cuff Size: Standard)   Pulse  "80   Temp 98 °F (36.7 °C) (Tympanic)   Resp 17   Ht 5' 8\" (1.727 m)   Wt 87.1 kg (192 lb)   SpO2 98%   BMI 29.19 kg/m²     Physical Exam    Physical Exam  Vitals and nursing note reviewed.   Constitutional:       Appearance: Normal appearance. She is well-developed.   HENT:      Head: Normocephalic and atraumatic.      Right Ear: External ear normal.      Left Ear: External ear normal.      Nose: Nose normal.   Eyes:      Extraocular Movements: Extraocular movements intact.      Conjunctiva/sclera: Conjunctivae normal.      Pupils: Pupils are equal, round, and reactive to light.   Cardiovascular:      Rate and Rhythm: Normal rate and regular rhythm.      Heart sounds: Normal heart sounds.   Pulmonary:      Effort: Pulmonary effort is normal.      Breath sounds: Normal breath sounds.   Abdominal:      General: Bowel sounds are normal.      Palpations: Abdomen is soft.   Musculoskeletal:         General: Normal range of motion.      Cervical back: Normal range of motion and neck supple.   Skin:     General: Skin is warm and dry.      Capillary Refill: Capillary refill takes less than 2 seconds.   Neurological:      General: No focal deficit present.      Mental Status: She is alert and oriented to person, place, and time.   Psychiatric:         Mood and Affect: Mood normal.         Behavior: Behavior normal.         Thought Content: Thought content normal.         Judgment: Judgment normal.         "

## 2025-01-15 NOTE — ASSESSMENT & PLAN NOTE
Depression Screening Follow-up Plan: Patient's depression screening was positive with a PHQ-9 score of 6. Patient with underlying depression and was advised to continue current medications as prescribed.

## 2025-01-15 NOTE — ASSESSMENT & PLAN NOTE
Did see endocrine  Levels back in range   Orders:  •  TSH, 3rd generation with Free T4 reflex; Future

## 2025-01-15 NOTE — ASSESSMENT & PLAN NOTE
Prior Authorization Clinical Questions for Weight Management Pharmacotherapy    1. Does the patient have a contrainidcation to medication prescribed for weight management?: No  2. Does the patient have a diagnosis of obesity, confirmed by a BMI greater than or equal to 30 kg/m^2?: No  3. Does the patient have a BMI of greater than or equal to 27 kg/m^2 with at least one weight-related comorbidity/risk factor/complication (e.g. diabetes, dyslipidemia, coronary artery disease)?: Yes  4. Weight-related co-morbidities/risk factors: metabolic syndrome, dyslipidemia, GERD, osteoarthritis  5. Has the patient been on a weight loss regimen of low-calorie diet, increased physical activity, and lifestyle modifications for a minimum of 6 months?: Yes  6. Has the patient completed a comprehensive weight loss program (ie, Weight Watchers, Noom, Bariatrics, other huyen on phone)? If so, what?: Yes   -- Q6 Further Explanation: weight watchers   7. Does the patient have a history of type 2 diabetes?: No  8. Has the member tried and failed other weight loss medication within the past 12 months?: No  9. Will the member use requested medication in combination with another GLP agonist or weight loss drug?: No  10. Is the medication a controlled substance?: No  For renewals: Has the patient had a positive outcome with current weight management medication (i.e., change in body weight of at least 4-5% after 12-16 weeks on maximally tolerated dose)?: Yes     Baseline weight (in pounds): 217 lbs  Current weight (in pounds): 192 lbs  Weight loss percentage: -11.52%       Unable to increase to 7.5  Has rash- stomach and face  Injection site reaction at that dose  Needed to drop down to 5  Orders:  •  Zepbound 5 MG/0.5ML auto-injector; Inject 0.5 mL (5 mg total) under the skin once a week for 28 days Side effects from the 7.5 dose

## 2025-01-16 ENCOUNTER — TELEPHONE (OUTPATIENT)
Dept: FAMILY MEDICINE CLINIC | Facility: CLINIC | Age: 50
End: 2025-01-16

## 2025-03-17 ENCOUNTER — APPOINTMENT (OUTPATIENT)
Dept: LAB | Facility: AMBULARY SURGERY CENTER | Age: 50
End: 2025-03-17
Payer: COMMERCIAL

## 2025-03-17 ENCOUNTER — OFFICE VISIT (OUTPATIENT)
Dept: FAMILY MEDICINE CLINIC | Facility: CLINIC | Age: 50
End: 2025-03-17
Payer: COMMERCIAL

## 2025-03-17 VITALS
WEIGHT: 193 LBS | HEIGHT: 68 IN | BODY MASS INDEX: 29.25 KG/M2 | DIASTOLIC BLOOD PRESSURE: 82 MMHG | RESPIRATION RATE: 16 BRPM | TEMPERATURE: 97.2 F | SYSTOLIC BLOOD PRESSURE: 140 MMHG | OXYGEN SATURATION: 100 % | HEART RATE: 90 BPM

## 2025-03-17 DIAGNOSIS — R73.01 IFG (IMPAIRED FASTING GLUCOSE): ICD-10-CM

## 2025-03-17 DIAGNOSIS — R70.0 ELEVATED SED RATE: ICD-10-CM

## 2025-03-17 DIAGNOSIS — F32.5 MAJOR DEPRESSIVE DISORDER WITH SINGLE EPISODE, IN FULL REMISSION (HCC): ICD-10-CM

## 2025-03-17 DIAGNOSIS — E03.9 ACQUIRED HYPOTHYROIDISM: ICD-10-CM

## 2025-03-17 DIAGNOSIS — Z00.00 ANNUAL PHYSICAL EXAM: Primary | ICD-10-CM

## 2025-03-17 DIAGNOSIS — E53.8 VITAMIN B12 DEFICIENCY: ICD-10-CM

## 2025-03-17 DIAGNOSIS — E66.811 CLASS 1 OBESITY DUE TO EXCESS CALORIES WITHOUT SERIOUS COMORBIDITY WITH BODY MASS INDEX (BMI) OF 33.0 TO 33.9 IN ADULT: ICD-10-CM

## 2025-03-17 DIAGNOSIS — E78.5 MILD HYPERLIPIDEMIA: ICD-10-CM

## 2025-03-17 DIAGNOSIS — Z13.1 SCREENING FOR DIABETES MELLITUS: ICD-10-CM

## 2025-03-17 DIAGNOSIS — E66.09 CLASS 1 OBESITY DUE TO EXCESS CALORIES WITHOUT SERIOUS COMORBIDITY WITH BODY MASS INDEX (BMI) OF 33.0 TO 33.9 IN ADULT: ICD-10-CM

## 2025-03-17 DIAGNOSIS — M79.7 FIBROMYALGIA: ICD-10-CM

## 2025-03-17 LAB
ALBUMIN SERPL BCG-MCNC: 4.3 G/DL (ref 3.5–5)
ALP SERPL-CCNC: 81 U/L (ref 34–104)
ALT SERPL W P-5'-P-CCNC: 9 U/L (ref 7–52)
ANION GAP SERPL CALCULATED.3IONS-SCNC: 8 MMOL/L (ref 4–13)
AST SERPL W P-5'-P-CCNC: 14 U/L (ref 13–39)
BILIRUB SERPL-MCNC: 0.72 MG/DL (ref 0.2–1)
BUN SERPL-MCNC: 8 MG/DL (ref 5–25)
CALCIUM SERPL-MCNC: 9.4 MG/DL (ref 8.4–10.2)
CHLORIDE SERPL-SCNC: 103 MMOL/L (ref 96–108)
CHOLEST SERPL-MCNC: 210 MG/DL (ref ?–200)
CO2 SERPL-SCNC: 26 MMOL/L (ref 21–32)
CREAT SERPL-MCNC: 0.6 MG/DL (ref 0.6–1.3)
ERYTHROCYTE [SEDIMENTATION RATE] IN BLOOD: 39 MM/HOUR (ref 0–19)
EST. AVERAGE GLUCOSE BLD GHB EST-MCNC: 117 MG/DL
GFR SERPL CREATININE-BSD FRML MDRD: 108 ML/MIN/1.73SQ M
GLUCOSE P FAST SERPL-MCNC: 91 MG/DL (ref 65–99)
HBA1C MFR BLD: 5.7 %
HDLC SERPL-MCNC: 43 MG/DL
LDLC SERPL CALC-MCNC: 120 MG/DL (ref 0–100)
POTASSIUM SERPL-SCNC: 4.2 MMOL/L (ref 3.5–5.3)
PROT SERPL-MCNC: 7.5 G/DL (ref 6.4–8.4)
SODIUM SERPL-SCNC: 137 MMOL/L (ref 135–147)
TRIGL SERPL-MCNC: 235 MG/DL (ref ?–150)
TSH SERPL DL<=0.05 MIU/L-ACNC: 0.75 UIU/ML (ref 0.45–4.5)

## 2025-03-17 PROCEDURE — 96372 THER/PROPH/DIAG INJ SC/IM: CPT

## 2025-03-17 PROCEDURE — 80061 LIPID PANEL: CPT

## 2025-03-17 PROCEDURE — 99396 PREV VISIT EST AGE 40-64: CPT | Performed by: FAMILY MEDICINE

## 2025-03-17 PROCEDURE — 83036 HEMOGLOBIN GLYCOSYLATED A1C: CPT

## 2025-03-17 PROCEDURE — 84443 ASSAY THYROID STIM HORMONE: CPT

## 2025-03-17 PROCEDURE — 36415 COLL VENOUS BLD VENIPUNCTURE: CPT

## 2025-03-17 PROCEDURE — 85652 RBC SED RATE AUTOMATED: CPT

## 2025-03-17 PROCEDURE — 80053 COMPREHEN METABOLIC PANEL: CPT

## 2025-03-17 RX ORDER — TIRZEPATIDE 5 MG/.5ML
5 INJECTION, SOLUTION SUBCUTANEOUS WEEKLY
COMMUNITY
Start: 2025-02-18 | End: 2025-03-17 | Stop reason: SDUPTHER

## 2025-03-17 RX ORDER — TIRZEPATIDE 5 MG/.5ML
5 INJECTION, SOLUTION SUBCUTANEOUS WEEKLY
Qty: 6 ML | Refills: 1 | Status: SHIPPED | OUTPATIENT
Start: 2025-03-17

## 2025-03-17 RX ADMIN — CYANOCOBALAMIN 1000 MCG: 1000 INJECTION, SOLUTION INTRAMUSCULAR; SUBCUTANEOUS at 17:15

## 2025-03-17 NOTE — PROGRESS NOTES
Adult Annual Physical  Name: Ellie Cross      : 1975      MRN: [id number]  Encounter Provider: Mona Coley DO  Encounter Date: 3/17/2025   Encounter department: [facility]    Assessment & Plan  Annual physical exam         Fibromyalgia    Orders:  •  Ambulatory Referral to Rheumatology; Future    Elevated sed rate    Orders:  •  Ambulatory Referral to Rheumatology; Future  •  Sedimentation rate, automated; Future    Vitamin B12 deficiency  B12 today       Acquired hypothyroidism  Seeing endocrine       Major depressive disorder with single episode, in full remission (HCC)  stable         Class 1 obesity due to excess calories without serious comorbidity with body mass index (BMI) of 33.0 to 33.9 in adult    Will give zepbound 3 more months  Could not tolerate 7.5 due to reaction on skin  Orders:  •  Zepbound 5 MG/0.5ML auto-injector; Inject 0.5 mL (5 mg total) under the skin once a week    IFG (impaired fasting glucose)    Orders:  •  Hemoglobin A1C; Future    Preventive Screenings:  - Diabetes Screening: screening up-to-date  - Cholesterol Screening: screening not indicated and has hyperlipidemia   - Hepatitis C screening: screening up-to-date   - HIV screening: screening up-to-date   - Cervical cancer screening: screening up-to-date   - Breast cancer screening: screening up-to-date   - Colon cancer screening: screening up-to-date   - Lung cancer screening: screening not indicated     Counseling/Anticipatory Guidance:    - Diet: discussed recommendations for a healthy/well-balanced diet.   - Exercise: the importance of regular exercise/physical activity was discussed. Recommend exercise 3-5 times per week for at least 30 minutes.          History of Present Illness     Adult Annual Physical:  Patient presents for annual physical. Here for wellness  Doesn't feel any different on zepbound.     Review of Systems   Constitutional: Negative.    HENT: Negative.     Eyes: Negative.   "  Respiratory: Negative.     Cardiovascular: Negative.    Gastrointestinal: Negative.    Endocrine: Negative.    Genitourinary: Negative.    Musculoskeletal: Negative.    Allergic/Immunologic: Negative.    Neurological: Negative.    Hematological: Negative.    Psychiatric/Behavioral: Negative.       Medical History Reviewed by provider this encounter:  Tobacco  Allergies  Meds  Problems  Med Hx  Surg Hx  Fam Hx     .    Objective   /82 (BP Location: Left arm, Patient Position: Sitting)   Pulse 90   Temp (!) 97.2 °F (36.2 °C) (Tympanic)   Resp 16   Ht 5' 8.19\" (1.732 m)   Wt 87.5 kg (193 lb)   SpO2 100%   BMI 29.18 kg/m²     Physical Exam  Vitals and nursing note reviewed.   Constitutional:       Appearance: She is well-developed.   HENT:      Head: Normocephalic and atraumatic.      Right Ear: External ear normal.      Left Ear: External ear normal.      Nose: Nose normal.   Eyes:      Conjunctiva/sclera: Conjunctivae normal.      Pupils: Pupils are equal, round, and reactive to light.   Cardiovascular:      Rate and Rhythm: Normal rate and regular rhythm.      Heart sounds: Normal heart sounds.   Pulmonary:      Effort: Pulmonary effort is normal.      Breath sounds: Normal breath sounds.   Abdominal:      General: Bowel sounds are normal.      Palpations: Abdomen is soft.   Musculoskeletal:         General: Normal range of motion.      Cervical back: Normal range of motion and neck supple.   Skin:     General: Skin is warm and dry.      Capillary Refill: Capillary refill takes less than 2 seconds.   Neurological:      Mental Status: She is alert and oriented to person, place, and time.   Psychiatric:         Behavior: Behavior normal.         Thought Content: Thought content normal.         Judgment: Judgment normal.         "

## 2025-03-17 NOTE — ASSESSMENT & PLAN NOTE
Will give zepbound 3 more months  Could not tolerate 7.5 due to reaction on skin  Orders:  •  Zepbound 5 MG/0.5ML auto-injector; Inject 0.5 mL (5 mg total) under the skin once a week

## 2025-03-17 NOTE — ASSESSMENT & PLAN NOTE
Orders:  •  Ambulatory Referral to Rheumatology; Future  •  Sedimentation rate, automated; Future

## 2025-03-17 NOTE — PATIENT INSTRUCTIONS
"Patient Education     Routine physical for adults   The Basics   Written by the doctors and editors at [facility]   What is a physical? -- A physical is a routine visit, or \"check-up,\" with your doctor. You might also hear it called a \"wellness visit\" or \"preventive visit.\"  During each visit, the doctor will:   Ask about your physical and mental health   Ask about your habits, behaviors, and lifestyle   Do an exam   Give you vaccines if needed   Talk to you about any medicines you take   Give advice about your health   Answer your questions  Getting regular check-ups is an important part of taking care of your health. It can help your doctor find and treat any problems you have. But it's also important for preventing health problems.  A routine physical is different from a \"sick visit.\" A sick visit is when you see a doctor because of a health concern or problem. Since physicals are scheduled ahead of time, you can think about what you want to ask the doctor.  How often should I get a physical? -- It depends on your age and health. In general, for people age 21 years and older:   If you are younger than 50 years, you might be able to get a physical every 3 years.   If you are 50 years or older, your doctor might recommend a physical every year.  If you have an ongoing health condition, like diabetes or high blood pressure, your doctor will probably want to see you more often.  What happens during a physical? -- In general, each visit will include:   Physical exam - The doctor or nurse will check your height, weight, heart rate, and blood pressure. They will also look at your eyes and ears. They will ask about how you are feeling and whether you have any symptoms that bother you.   Medicines - It's a good idea to bring a list of all the medicines you take to each doctor visit. Your doctor will talk to you about your medicines and answer any questions. Tell them if you are having any side effects that bother you. You " "should also tell them if you are having trouble paying for any of your medicines.   Habits and behaviors - This includes:   Your diet   Your exercise habits   Whether you smoke, drink alcohol, or use drugs   Whether you are sexually active   Whether you feel safe at home  Your doctor will talk to you about things you can do to improve your health and lower your risk of health problems. They will also offer help and support. For example, if you want to quit smoking, they can give you advice and might prescribe medicines. If you want to improve your diet or get more physical activity, they can help you with this, too.   Lab tests, if needed - The tests you get will depend on your age and situation. For example, your doctor might want to check your:   Cholesterol   Blood sugar   Iron level   Vaccines - The recommended vaccines will depend on your age, health, and what vaccines you already had. Vaccines are very important because they can prevent certain serious or deadly infections.   Discussion of screening - \"Screening\" means checking for diseases or other health problems before they cause symptoms. Your doctor can recommend screening based on your age, risk, and preferences. This might include tests to check for:   Cancer, such as breast, prostate, cervical, ovarian, colorectal, prostate, lung, or skin cancer   Sexually transmitted infections, such as chlamydia and gonorrhea   Mental health conditions like depression and anxiety  Your doctor will talk to you about the different types of screening tests. They can help you decide which screenings to have. They can also explain what the results might mean.   Answering questions - The physical is a good time to ask the doctor or nurse questions about your health. If needed, they can refer you to other doctors or specialists, too.  Adults older than 65 years often need other care, too. As you get older, your doctor will talk to you about:   How to prevent falling at " home   Hearing or vision tests   Memory testing   How to take your medicines safely   Making sure that you have the help and support you need at home  All topics are updated as new evidence becomes available and our peer review process is complete.  This topic retrieved from [employer] on: May 02, 2024.  Topic 911802 Version 1.0  Release: 32.4.3 - C32.122  © 2024 UpToDate, Inc. and/or its affiliates. All rights reserved.  Consumer Information Use and Disclaimer   Disclaimer: This generalized information is a limited summary of diagnosis, treatment, and/or medication information. It is not meant to be comprehensive and should be used as a tool to help the user understand and/or assess potential diagnostic and treatment options. It does NOT include all information about conditions, treatments, medications, side effects, or risks that may apply to a specific patient. It is not intended to be medical advice or a substitute for the medical advice, diagnosis, or treatment of a health care provider based on the health care provider's examination and assessment of a patient's specific and unique circumstances. Patients must speak with a health care provider for complete information about their health, medical questions, and treatment options, including any risks or benefits regarding use of medications. This information does not endorse any treatments or medications as safe, effective, or approved for treating a specific patient. UpToDate, Inc. and its affiliates disclaim any warranty or liability relating to this information or the use thereof.The use of this information is governed by the Terms of Use, available at https://www.woltersSparkLixuwer.com/en/know/clinical-effectiveness-terms. 2024© UpToDate, Inc. and its affiliates and/or licensors. All rights reserved.  Copyright   © 2024 UpToDate, Inc. and/or its affiliates. All rights reserved.

## 2025-04-04 NOTE — PROGRESS NOTES
Rheumatology Initial Outpatient Visit  Name: Ellie Cross      : 1975      MRN: [id number]  Encounter Provider: Beatrice Alcaraz MD  Encounter Date: 2025   Encounter department: [facility]  :  Assessment & Plan  Elevated sed rate  49-year-old female who presents for further evaluation of chronically elevated ESR.  In general, patient reports feeling more fatigued and rundown, intermittent hair loss, poor sleep quality.  She has not developed any new symptoms such as photosensitive rash, recurrent nasal/oral ulcerations, pleurisy, leukopenia, fever, unintentional weight loss, etc that would be suggestive of CTD, inflammatory arthritis, vasculitis, or other rheumatologic process.  Discussed with patient that ESR elevation for most patients is multifactorial and can increase in women particularly with age.  At this time, low suspicion for underlying rheumatologic disease contributing to the elevated ESR.  No additional laboratory studies needed at this time.  Patient may follow-up in rheumatology as needed.  Orders:    Ambulatory Referral to Rheumatology    Fibromyalgia  Diagnosed about 15 years ago.  Currently well-controlled.  Orders:    Ambulatory Referral to Rheumatology        History of Present Illness   HPI  Ellie Cross is a 49 y.o. female who presents for evaluation of elevated ESR.  Patient reports she has been dealing with elevated ESR for quite some time without clear etiology.  She reports in general she feels fatigued and rundown.  She also admits to hair loss.  Generally more diffuse.  Happens a few times per year.  She also reports she does not have very good sleep pattern.  She can fall asleep but is usually up multiple times throughout the night.  All of the symptoms have been more chronic for her.  She does admit to recently starting to develop a red nose.  Seems to happen multiple times throughout the day though some days it does not happen.  Not  "photosensitive.  She reports longstanding history of bruising more easily.  She has a history of lipedema in the right lower extremity.  She reports also entering into perimenopause.    She reports a history of fibromyalgia diagnosed about 15 years ago but reports the symptoms overall are very stable for her.    Review of Systems  Complete ROS conducted as per HPI.  + Mild dry eye which she attributes to possibly allergies, no significant dry mouth  +Numbness in fingertips after she eats lunch   In addition, denies:  Fever  Photosensitive rash  Recurrent oral ulcers  Muscle weakness  Uveitis  Dactylitis  Chest pain  SOB  Pleurisy  Raynaud's  Joint issues other than noted above      Objective   Ht 5' 8\" (1.727 m)   BMI 29.35 kg/m²      Physical Exam  Physical Exam  Constitutional: well appearing, no acute distress  HEENT: normocephalic, sclera clear, no visible oral or nasal ulcers  Neck: supple, no palpable cervical adenopathy  CV: regular rate and rhythm, no murmur  Pulm: normal respiratory effort, lungs clear to auscultation b/l  Skin: no rashes, no skin thickening  Extremities: warm and well perfused, chronic lipedema  MSK: No synovitis or effusions, slightly hyperextended both the bilateral elbows and bilateral knees    Labs and Imaging  I have personally reviewed pertinent labs and imaging.     ESR 39, chronically elevated  "

## 2025-04-11 ENCOUNTER — OFFICE VISIT (OUTPATIENT)
Dept: RHEUMATOLOGY | Facility: CLINIC | Age: 50
End: 2025-04-11
Payer: COMMERCIAL

## 2025-04-11 VITALS
SYSTOLIC BLOOD PRESSURE: 128 MMHG | WEIGHT: 193 LBS | HEART RATE: 84 BPM | HEIGHT: 68 IN | DIASTOLIC BLOOD PRESSURE: 84 MMHG | OXYGEN SATURATION: 97 % | BODY MASS INDEX: 29.25 KG/M2

## 2025-04-11 DIAGNOSIS — R70.0 ELEVATED SED RATE: Primary | ICD-10-CM

## 2025-04-11 DIAGNOSIS — M79.7 FIBROMYALGIA: ICD-10-CM

## 2025-04-11 PROCEDURE — 99244 OFF/OP CNSLTJ NEW/EST MOD 40: CPT | Performed by: STUDENT IN AN ORGANIZED HEALTH CARE EDUCATION/TRAINING PROGRAM

## 2025-04-11 NOTE — PATIENT INSTRUCTIONS
What is an ESR?    ESR stands for erythrocyte sedimentation rate. It is an indirect measurement of inflammation. It is a measurement of the distance in millimeters that a red blood cell falls within a specified tube over one hour. The faster the red blood cells fall, the higher the ESR. Red blood cells fall faster when there are acute phase reactants and immunoglobulins in the blood.    What causes acute phase reactants and immunoglobulins? In some cases this is due to a rheumatologic inflammatory condition, such as rheumatoid arthritis, polymyalgia rheumatica, or vasculitis. However, there are many other non-rheumatologic conditions that can be associated with increased acute phase reactants or immunoglobulins. These include things like infection, cancer, pregnancy, obesity, diabetes, kidney disease, anemia, and heart disease. Being female also increases the ESR. In addition, the ESR increases as we get older. For example, a rough rule of thumb for the age adjusted upper limit of normal for the ESR is: male = age/two and female = (age+10)/2.     What is CRP?    CRP stands for C reactive protein. It is produced by the liver as an acute phase reactant in response to a signal. Similarly to the ESR, this signal can be due to a rheumatologic condition but can also be due to things like infection, malignancy, obesity, trauma, etc. The CRP is also increased in females, advancing age, and certain ethnicities. CRP elevation occurs rapidly after the initial signal, but tends to fall more quickly in the absence of continued signal.    Thus, an elevated ESR and CRP does not always reflect an underlying rheumatologic condition. We need to look at the laboratory values in the context of the patient as a whole to determine how clinically meaningful the value is.

## 2025-04-11 NOTE — ASSESSMENT & PLAN NOTE
49-year-old female who presents for further evaluation of chronically elevated ESR.  In general, patient reports feeling more fatigued and rundown, intermittent hair loss, poor sleep quality.  She has not developed any new symptoms such as photosensitive rash, recurrent nasal/oral ulcerations, pleurisy, leukopenia, fever, unintentional weight loss, etc that would be suggestive of CTD, inflammatory arthritis, vasculitis, or other rheumatologic process.  Discussed with patient that ESR elevation for most patients is multifactorial and can increase in women particularly with age.  At this time, low suspicion for underlying rheumatologic disease contributing to the elevated ESR.  No additional laboratory studies needed at this time.  Patient may follow-up in rheumatology as needed.  Orders:    Ambulatory Referral to Rheumatology

## 2025-04-11 NOTE — ASSESSMENT & PLAN NOTE
Diagnosed about 15 years ago.  Currently well-controlled.  Orders:    Ambulatory Referral to Rheumatology

## 2025-04-15 ENCOUNTER — HOSPITAL ENCOUNTER (OUTPATIENT)
Dept: RADIOLOGY | Age: 50
Discharge: HOME | End: 2025-04-15
Payer: COMMERCIAL

## 2025-04-15 VITALS — HEIGHT: 68 IN | BODY MASS INDEX: 29.25 KG/M2 | WEIGHT: 193 LBS

## 2025-04-15 DIAGNOSIS — Z12.31 VISIT FOR SCREENING MAMMOGRAM: ICD-10-CM

## 2025-04-15 PROCEDURE — 77067 SCR MAMMO BI INCL CAD: CPT

## 2025-04-15 PROCEDURE — 77063 BREAST TOMOSYNTHESIS BI: CPT

## 2025-04-23 ENCOUNTER — RESULTS FOLLOW-UP (OUTPATIENT)
Dept: OBGYN CLINIC | Facility: CLINIC | Age: 50
End: 2025-04-23

## 2025-06-10 ENCOUNTER — OFFICE VISIT (OUTPATIENT)
Dept: DERMATOLOGY | Facility: CLINIC | Age: 50
End: 2025-06-10
Payer: COMMERCIAL

## 2025-06-10 VITALS — BODY MASS INDEX: 29.04 KG/M2 | TEMPERATURE: 96.6 F | WEIGHT: 191 LBS

## 2025-06-10 DIAGNOSIS — L81.4 LENTIGINES: ICD-10-CM

## 2025-06-10 DIAGNOSIS — D22.71 MULTIPLE BENIGN MELANOCYTIC NEVI OF UPPER AND LOWER EXTREMITIES AND TRUNK: Primary | ICD-10-CM

## 2025-06-10 DIAGNOSIS — L82.1 SEBORRHEIC KERATOSES: ICD-10-CM

## 2025-06-10 DIAGNOSIS — D22.62 MULTIPLE BENIGN MELANOCYTIC NEVI OF UPPER AND LOWER EXTREMITIES AND TRUNK: Primary | ICD-10-CM

## 2025-06-10 DIAGNOSIS — D22.72 MULTIPLE BENIGN MELANOCYTIC NEVI OF UPPER AND LOWER EXTREMITIES AND TRUNK: Primary | ICD-10-CM

## 2025-06-10 DIAGNOSIS — D18.01 CHERRY ANGIOMA: ICD-10-CM

## 2025-06-10 DIAGNOSIS — D22.5 MULTIPLE BENIGN MELANOCYTIC NEVI OF UPPER AND LOWER EXTREMITIES AND TRUNK: Primary | ICD-10-CM

## 2025-06-10 DIAGNOSIS — D22.61 MULTIPLE BENIGN MELANOCYTIC NEVI OF UPPER AND LOWER EXTREMITIES AND TRUNK: Primary | ICD-10-CM

## 2025-06-10 PROCEDURE — 99213 OFFICE O/P EST LOW 20 MIN: CPT | Performed by: DERMATOLOGY

## 2025-06-10 NOTE — PROGRESS NOTES
"[facility] Note     Patient Name: Ellie Cross  Encounter Date: 06/10/2025     Have you been cared for by a [facility] Dermatologist in the last 3 years and, if so, which description applies to you? Yes. I have been here within the last 3 years, and my medical history has NOT changed since that time. I am of child-bearing potential.     REVIEW OF SYSTEMS:  Have you recently had or currently have any of the following? No changes in my recent health.   PAST MEDICAL HISTORY:  Have you personally ever had or currently have any of the following?  If \"YES,\" then please provide more detail. No changes in my medical history.   HISTORY OF IMMUNOSUPPRESSION: Do you have a history of any of the following:  Systemic Immunosuppression such as Diabetes, Biologic or Immunotherapy, Chemotherapy, Organ Transplantation, Bone Marrow Transplantation or Prednisone?  No     Answering \"YES\" requires the addition of the dotphrase \"IMMUNOSUPPRESSED\" as the first diagnosis of the patient's visit.   FAMILY HISTORY:  Any \"first degree relatives\" (parent, brother, sister, or child) with the following?    No changes in my family's known health.   PATIENT EXPERIENCE:    Do you want the Dermatologist to perform a COMPLETE skin exam today including a clinical examination under the \"bra and underwear\" areas?  Yes  If necessary, do we have your permission to call and leave a detailed message on your Preferred Phone number that includes your specific medical information?  Yes      Allergies[1] Current Medications[2]        Whom besides the patient is providing clinical information about today's encounter?   NO ADDITIONAL HISTORIAN (patient alone provided history)    Physical Exam and Assessment/Plan by Diagnosis:    SEBORRHEIC KERATOSES  - Relevant exam: Scattered over the trunk/extremities are waxy brown to black plaques and papules with stuck on appearance and consistent dermoscopy  - Exam and clinical history consistent with " seborrheic keratoses  - Counseled that these are benign growths that do not require treatment    MELANOCYTIC NEVI  -Relevant exam: Scattered over the trunk/extremities are homogenously pigmented brown macules and papules. ELM performed and without concerning findings. No outliers unless otherwise noted in today's note  - Exam and clinical history consistent with melanocytic nevi  - Counseled to return to clinic prior to scheduled appointment should any of these lesions change or should any new lesions of concern arise  - Counseled on use of sun protection daily. Reviewed latest FDA sunscreen guidelines, including use of broad spectrum (UVA and UVB blocking) sunscreen or sun protective clothing with SPF 30-50 every 2-3 hours and reapplied after exposure to water    LENTIGINES  OTHER SKIN CHANGES DUE TO CHRONIC EXPOSURE TO NONIONIZING RADIATION  - Relevant exam: Over sun exposed areas are brown macules. ELM performed and without concerning findings.  - Exam and clinical history consistent with lentigines.  - Counseled to return to clinic prior to scheduled appointment should any of these lesions change or should any new lesions of concern arise.  - Recommended use of sunscreen as above and below.    CHERRY ANGIOMAS  - Relevant exam: Scattered over the trunk/extremities are red papules  - Exam and clinical history consistent with cherry angiomas  - Educated that these are benign      HISTORY OF BASAL CELL CARCINOMA    Physical Exam:  Anatomic Location Affected:  right conchal bowl  Morphological Description of scar:  well healed  Suspected Recurrence: No    Additional History of Present Condition:  History of basal cell carcinoma with no sign of recurrence. Treated with Mohs July 2024.    Assessment and Plan:  Based on a thorough discussion of this condition and the management approach to it (including a comprehensive discussion of the known risks, side effects and potential benefits of treatment), the patient (family)  agrees to implement the following specific plan:  Continue annual skin checks  Monitor scar for recurrence    How can basal cell carcinoma be prevented?  The most important way to prevent BCC is to avoid sunburn. This is especially important in childhood and early life. Fair skinned individuals and those with a personal or family history of BCC should protect their skin from sun exposure daily, year-round and lifelong.  Stay indoors or under the shade in the middle of the day   Wear covering clothing   Apply high protection factor SPF50+ broad-spectrum sunscreens generously to exposed skin if outdoors   Avoid indoor tanning (sun beds, solaria)  Oral nicotinamide (vitamin B3) in a dose of 500 mg twice daily may reduce the number and severity of BCCs.    What is the outlook for basal cell carcinoma?  Most BCCs are cured by treatment. Cure is most likely if treatment is undertaken when the lesion is small.  About 50% of people with BCC develop a second one within 3 years of the first. They are also at increased risk of other skin cancers, especially melanoma. Regular self-skin examinations and long-term annual skin checks by an experienced health professional are recommended.    Scribe Attestation      I,:  Savannah Rodriguez MA am acting as a scribe while in the presence of the attending physician.:       I,:  Teresa June MD personally performed the services described in this documentation    as scribed in my presence.:                [1]   Allergies  Allergen Reactions    Pollen Extract    [2]   Current Outpatient Medications:     acetaminophen (TYLENOL) 500 mg tablet, Take 2 tablets (1,000 mg total) by mouth every 6 (six) hours as needed for mild pain or moderate pain, Disp: 30 tablet, Rfl: 0    albuterol (ProAir HFA) 90 mcg/act inhaler, Inhale 2 puffs every 6 (six) hours as needed for wheezing, Disp: 8.5 g, Rfl: 0    ALPRAZolam (XANAX) 0.5 mg tablet, Take 1 tablet (0.5 mg total) by mouth daily as needed for  anxiety, Disp: 30 tablet, Rfl: 3    Diclofenac Sodium (VOLTAREN) 1 %, Apply 2 g topically 3 (three) times a day as needed (for left knee pain), Disp: 50 g, Rfl: 1    ergocalciferol (VITAMIN D2) 50,000 units, Take 1 capsule (50,000 Units total) by mouth once a week, Disp: 12 capsule, Rfl: 0    levothyroxine (Synthroid) 125 mcg tablet, Take 1 tablet (125 mcg total) by mouth daily, Disp: 90 tablet, Rfl: 1    meclizine (ANTIVERT) 12.5 MG tablet, Take 1 tablet (12.5 mg total) by mouth 3 (three) times a day as needed for dizziness, Disp: 30 tablet, Rfl: 0    meloxicam (Mobic) 15 mg tablet, Take 1 tablet (15 mg total) by mouth daily, Disp: 14 tablet, Rfl: 0    nystatin (MYCOSTATIN) cream, Apply topically 2 (two) times a day, Disp: 30 g, Rfl: 5    nystatin (MYCOSTATIN) powder, Apply topically 3 (three) times a day, Disp: 60 g, Rfl: 1    ondansetron (ZOFRAN-ODT) 4 mg disintegrating tablet, Take 1 tablet (4 mg total) by mouth every 6 (six) hours as needed for nausea or vomiting, Disp: 10 tablet, Rfl: 1    scopolamine (TRANSDERM-SCOP) 1 mg/3 days TD 72 hr patch, Place 1 patch on the skin over 72 hours every third day, Disp: 10 patch, Rfl: 0    triamcinolone (KENALOG) 0.1 % cream, Apply BID for up to 2 weeks to affected skin on neck down prn flares then take one week break and can repeat regimen prn flares. Do not apply to groin, other skin folds, face., Disp: 45 g, Rfl: 2    valACYclovir (VALTREX) 1,000 mg tablet, Take 1 tablet (1,000 mg total) by mouth 2 (two) times a day for 5 days (Patient not taking: Reported on 8/10/2023), Disp: 10 tablet, Rfl: 5    Zepbound 5 MG/0.5ML auto-injector, Inject 0.5 mL (5 mg total) under the skin once a week, Disp: 6 mL, Rfl: 1    ZOLMitriptan (ZOMIG) 5 MG tablet, Take 1 tablet (5 mg total) by mouth once as needed for migraine for up to 1 dose, Disp: 6 tablet, Rfl: 0    Current Facility-Administered Medications:     cyanocobalamin injection 1,000 mcg, 1,000 mcg, Intramuscular, Q30 Days, Mona  DO Vianca, 1,000 mcg at 03/17/25 8702

## 2025-06-13 DIAGNOSIS — E03.9 ACQUIRED HYPOTHYROIDISM: ICD-10-CM

## 2025-06-13 RX ORDER — LEVOTHYROXINE SODIUM 125 UG/1
125 TABLET ORAL DAILY
Qty: 90 TABLET | Refills: 0 | Status: SHIPPED | OUTPATIENT
Start: 2025-06-13

## 2025-07-11 ENCOUNTER — APPOINTMENT (OUTPATIENT)
Dept: LAB | Facility: CLINIC | Age: 50
End: 2025-07-11
Attending: FAMILY MEDICINE
Payer: COMMERCIAL

## 2025-07-11 DIAGNOSIS — R70.0 ELEVATED SED RATE: ICD-10-CM

## 2025-07-11 DIAGNOSIS — R73.01 IFG (IMPAIRED FASTING GLUCOSE): ICD-10-CM

## 2025-07-11 LAB
ERYTHROCYTE [SEDIMENTATION RATE] IN BLOOD: 12 MM/HOUR (ref 0–19)
EST. AVERAGE GLUCOSE BLD GHB EST-MCNC: 111 MG/DL
HBA1C MFR BLD: 5.5 %

## 2025-07-11 PROCEDURE — 85652 RBC SED RATE AUTOMATED: CPT

## 2025-07-11 PROCEDURE — 36415 COLL VENOUS BLD VENIPUNCTURE: CPT

## 2025-07-11 PROCEDURE — 83036 HEMOGLOBIN GLYCOSYLATED A1C: CPT

## 2025-07-23 DIAGNOSIS — E03.9 ACQUIRED HYPOTHYROIDISM: Primary | ICD-10-CM

## 2025-07-24 ENCOUNTER — APPOINTMENT (OUTPATIENT)
Dept: LAB | Facility: CLINIC | Age: 50
End: 2025-07-24
Attending: INTERNAL MEDICINE
Payer: COMMERCIAL

## 2025-07-24 DIAGNOSIS — E03.9 ACQUIRED HYPOTHYROIDISM: ICD-10-CM

## 2025-07-24 LAB
T4 FREE SERPL-MCNC: 1.25 NG/DL (ref 0.61–1.12)
TSH SERPL DL<=0.05 MIU/L-ACNC: 1.41 UIU/ML (ref 0.45–4.5)

## 2025-07-24 PROCEDURE — 84439 ASSAY OF FREE THYROXINE: CPT

## 2025-07-24 PROCEDURE — 84443 ASSAY THYROID STIM HORMONE: CPT

## 2025-07-24 PROCEDURE — 36415 COLL VENOUS BLD VENIPUNCTURE: CPT

## 2025-07-28 ENCOUNTER — OFFICE VISIT (OUTPATIENT)
Dept: FAMILY MEDICINE CLINIC | Facility: CLINIC | Age: 50
End: 2025-07-28
Payer: COMMERCIAL

## 2025-07-28 VITALS
HEIGHT: 68 IN | TEMPERATURE: 98.1 F | HEART RATE: 77 BPM | RESPIRATION RATE: 18 BRPM | OXYGEN SATURATION: 99 % | SYSTOLIC BLOOD PRESSURE: 110 MMHG | WEIGHT: 191 LBS | BODY MASS INDEX: 28.95 KG/M2 | DIASTOLIC BLOOD PRESSURE: 86 MMHG

## 2025-07-28 DIAGNOSIS — E66.811 CLASS 1 OBESITY DUE TO EXCESS CALORIES WITHOUT SERIOUS COMORBIDITY WITH BODY MASS INDEX (BMI) OF 33.0 TO 33.9 IN ADULT: Primary | ICD-10-CM

## 2025-07-28 DIAGNOSIS — M79.7 FIBROMYALGIA: ICD-10-CM

## 2025-07-28 DIAGNOSIS — R73.01 IFG (IMPAIRED FASTING GLUCOSE): ICD-10-CM

## 2025-07-28 DIAGNOSIS — E66.09 CLASS 1 OBESITY DUE TO EXCESS CALORIES WITHOUT SERIOUS COMORBIDITY WITH BODY MASS INDEX (BMI) OF 33.0 TO 33.9 IN ADULT: Primary | ICD-10-CM

## 2025-07-28 DIAGNOSIS — E78.2 MIXED HYPERLIPIDEMIA: ICD-10-CM

## 2025-07-28 DIAGNOSIS — E53.8 VITAMIN B12 DEFICIENCY: ICD-10-CM

## 2025-07-28 PROCEDURE — 99214 OFFICE O/P EST MOD 30 MIN: CPT | Performed by: FAMILY MEDICINE

## 2025-07-28 PROCEDURE — 96372 THER/PROPH/DIAG INJ SC/IM: CPT

## 2025-07-28 RX ADMIN — CYANOCOBALAMIN 1000 MCG: 1000 INJECTION, SOLUTION INTRAMUSCULAR; SUBCUTANEOUS at 17:02

## 2025-08-04 ENCOUNTER — OFFICE VISIT (OUTPATIENT)
Dept: ENDOCRINOLOGY | Facility: CLINIC | Age: 50
End: 2025-08-04
Payer: COMMERCIAL

## 2025-08-04 VITALS
TEMPERATURE: 98.2 F | OXYGEN SATURATION: 98 % | WEIGHT: 190 LBS | RESPIRATION RATE: 12 BRPM | DIASTOLIC BLOOD PRESSURE: 66 MMHG | SYSTOLIC BLOOD PRESSURE: 102 MMHG | HEIGHT: 68 IN | BODY MASS INDEX: 28.79 KG/M2 | HEART RATE: 82 BPM

## 2025-08-04 DIAGNOSIS — E66.811 CLASS 1 OBESITY DUE TO EXCESS CALORIES WITHOUT SERIOUS COMORBIDITY WITH BODY MASS INDEX (BMI) OF 33.0 TO 33.9 IN ADULT: ICD-10-CM

## 2025-08-04 DIAGNOSIS — E66.09 CLASS 1 OBESITY DUE TO EXCESS CALORIES WITHOUT SERIOUS COMORBIDITY WITH BODY MASS INDEX (BMI) OF 33.0 TO 33.9 IN ADULT: ICD-10-CM

## 2025-08-04 DIAGNOSIS — L65.9 HAIR LOSS: ICD-10-CM

## 2025-08-04 DIAGNOSIS — E03.9 ACQUIRED HYPOTHYROIDISM: Primary | ICD-10-CM

## 2025-08-04 DIAGNOSIS — E61.1 IRON DEFICIENCY: ICD-10-CM

## 2025-08-04 DIAGNOSIS — E55.9 VITAMIN D DEFICIENCY: ICD-10-CM

## 2025-08-04 PROCEDURE — 99214 OFFICE O/P EST MOD 30 MIN: CPT | Performed by: INTERNAL MEDICINE

## 2025-08-04 RX ORDER — LEVOTHYROXINE SODIUM 125 UG/1
125 TABLET ORAL DAILY
Qty: 90 TABLET | Refills: 1 | Status: SHIPPED | OUTPATIENT
Start: 2025-08-04

## 2025-08-07 DIAGNOSIS — E55.9 VITAMIN D DEFICIENCY: ICD-10-CM

## 2025-08-07 RX ORDER — ERGOCALCIFEROL 1.25 MG/1
50000 CAPSULE, LIQUID FILLED ORAL WEEKLY
Qty: 12 CAPSULE | Refills: 3 | Status: SHIPPED | OUTPATIENT
Start: 2025-08-07

## 2025-08-15 ENCOUNTER — APPOINTMENT (OUTPATIENT)
Dept: LAB | Facility: CLINIC | Age: 50
End: 2025-08-15
Attending: INTERNAL MEDICINE
Payer: COMMERCIAL

## (undated) DEVICE — SUT MONOCRYL 4-0 PS-2 18 IN Y496G

## (undated) DEVICE — LAPAROSCOPIC TROCAR SLEEVE/SINGLE USE: Brand: KII® OPTICAL ACCESS SYSTEM

## (undated) DEVICE — STERILE MINERVA DISPOSABLE HANDPIECECONTENTS:(1) ONE SINGLE USE STERILE MINERVA ES DISPOSABLE HANDPIECE (1) ONE SINGLE USE STERILE SYRINGE(1) ONE SINGLE USE STERILE 8MM HEGAR DILATOR(1) ONE SINGLE USE NON-STERILE DESICCANT(1) ONE NON-STERILE HANDPIECE INSTRUCTIONS FOR USE(1)  ONE NON-STERILE DILATOR INSTRUCTIONS FOR USE: Brand: MINERVA SINGLE STERILE DISPOSABLE HANDPIECE

## (undated) DEVICE — GLOVE INDICATOR PI UNDERGLOVE SZ 7 BLUE

## (undated) DEVICE — BETHLEHEM UNIVERSAL GYN LAP PK: Brand: CARDINAL HEALTH

## (undated) DEVICE — MAYO STAND COVER: Brand: CONVERTORS

## (undated) DEVICE — CHLORAPREP HI-LITE 26ML ORANGE

## (undated) DEVICE — PVC URETHRAL CATHETER: Brand: DOVER

## (undated) DEVICE — GLOVE SRG LF STRL BGL SKNSNS 6.5 PF

## (undated) DEVICE — CYSTO TUBING SINGLE IRRIGATION

## (undated) DEVICE — TRAY FOLEY 16FR URIMETER SILICONE SURESTEP

## (undated) DEVICE — BETHLEHEM UNIVERSAL MINOR VAG: Brand: CARDINAL HEALTH

## (undated) DEVICE — TUBING SMOKE EVAC W/FILTRATION DEVICE PLUMEPORT ACTIV

## (undated) DEVICE — ADHESIVE SKIN HIGH VISCOSITY EXOFIN 1ML

## (undated) DEVICE — INTENDED FOR TISSUE SEPARATION, AND OTHER PROCEDURES THAT REQUIRE A SHARP SURGICAL BLADE TO PUNCTURE OR CUT.: Brand: BARD-PARKER SAFETY BLADES SIZE 11, STERILE